# Patient Record
Sex: FEMALE | Race: WHITE | NOT HISPANIC OR LATINO | Employment: FULL TIME | URBAN - NONMETROPOLITAN AREA
[De-identification: names, ages, dates, MRNs, and addresses within clinical notes are randomized per-mention and may not be internally consistent; named-entity substitution may affect disease eponyms.]

---

## 2021-11-02 ENCOUNTER — TELEPHONE (OUTPATIENT)
Dept: FAMILY MEDICINE CLINIC | Age: 61
End: 2021-11-02

## 2021-11-02 PROBLEM — N90.3 VULVAR DYSPLASIA: Status: ACTIVE | Noted: 2021-11-02

## 2021-11-02 NOTE — TELEPHONE ENCOUNTER
Caller: Kranthi Farley    Relationship: Self    Best call back number: [phone]     What is the best time to reach you: ANYTIME    What was the call regarding: PATIENT RECEIVED TEXT REGARDING UPCOMING NURSE VISIT APPOINTMENT TO GET COVID TEST. SHE STATED SHE CANNOT COME IN THAT MORNING. IT IS LISTED AS A NURSE VISIT. TRIED TO WARM TRANSFER TO CLINICAL LINE, BUT PATIENT WANTED A MESSAGE SENT TO CLINICAL LINE INSTEAD OF TALKING TO THEM TO RESCHEDULE NOW.     Do you require a callback: YES

## 2021-11-10 RX ORDER — TOBRAMYCIN AND DEXAMETHASONE 3; 1 MG/ML; MG/ML
3 SUSPENSION/ DROPS OPHTHALMIC 2 TIMES DAILY
COMMUNITY
Start: 2021-10-01

## 2021-11-10 NOTE — PRE-PROCEDURE INSTRUCTIONS
PRE-OP INSTRUCTIONS REVIEWED WITH PATIENT: FASTING/BATHING/ARRIVAL PROCEDURES.  INSTRUCTED TO TAKE A.M. DAY OF SURGERY: NONE  UNDERSTANDING VERBALIZED.

## 2021-11-12 ENCOUNTER — ANESTHESIA EVENT (OUTPATIENT)
Dept: PERIOP | Facility: HOSPITAL | Age: 61
End: 2021-11-12

## 2021-11-12 ENCOUNTER — ANESTHESIA (OUTPATIENT)
Dept: PERIOP | Facility: HOSPITAL | Age: 61
End: 2021-11-12

## 2021-11-12 ENCOUNTER — HOSPITAL ENCOUNTER (OUTPATIENT)
Facility: HOSPITAL | Age: 61
Discharge: HOME | End: 2021-11-12
Attending: OBSTETRICS & GYNECOLOGY | Admitting: OBSTETRICS & GYNECOLOGY

## 2021-11-12 VITALS
HEART RATE: 99 BPM | RESPIRATION RATE: 16 BRPM | OXYGEN SATURATION: 96 % | DIASTOLIC BLOOD PRESSURE: 71 MMHG | WEIGHT: 201.28 LBS | HEIGHT: 72 IN | TEMPERATURE: 97.8 F | BODY MASS INDEX: 27.26 KG/M2 | SYSTOLIC BLOOD PRESSURE: 114 MMHG

## 2021-11-12 DIAGNOSIS — N90.3 VULVAR DYSPLASIA: Primary | ICD-10-CM

## 2021-11-12 PROCEDURE — 25010000002 MIDAZOLAM PER 1MG: Performed by: ANESTHESIOLOGY

## 2021-11-12 PROCEDURE — 25010000002 KETOROLAC TROMETHAMINE PER 15 MG: Performed by: NURSE ANESTHETIST, CERTIFIED REGISTERED

## 2021-11-12 PROCEDURE — 25010000002 FENTANYL CITRATE (PF) 50 MCG/ML SOLUTION: Performed by: NURSE ANESTHETIST, CERTIFIED REGISTERED

## 2021-11-12 PROCEDURE — 25010000002 PROPOFOL 10 MG/ML EMULSION: Performed by: NURSE ANESTHETIST, CERTIFIED REGISTERED

## 2021-11-12 PROCEDURE — 25010000002 ONDANSETRON PER 1 MG: Performed by: NURSE ANESTHETIST, CERTIFIED REGISTERED

## 2021-11-12 PROCEDURE — 88342 IMHCHEM/IMCYTCHM 1ST ANTB: CPT | Performed by: OBSTETRICS & GYNECOLOGY

## 2021-11-12 PROCEDURE — 88305 TISSUE EXAM BY PATHOLOGIST: CPT | Performed by: OBSTETRICS & GYNECOLOGY

## 2021-11-12 PROCEDURE — 25010000002 DEXAMETHASONE PER 1 MG: Performed by: NURSE ANESTHETIST, CERTIFIED REGISTERED

## 2021-11-12 RX ORDER — SCOLOPAMINE TRANSDERMAL SYSTEM 1 MG/1
1 PATCH, EXTENDED RELEASE TRANSDERMAL CONTINUOUS
Status: DISCONTINUED | OUTPATIENT
Start: 2021-11-12 | End: 2021-11-12 | Stop reason: HOSPADM

## 2021-11-12 RX ORDER — OXYCODONE HYDROCHLORIDE AND ACETAMINOPHEN 5; 325 MG/1; MG/1
1-2 TABLET ORAL EVERY 4 HOURS PRN
Qty: 5 TABLET | Refills: 0 | Status: SHIPPED | OUTPATIENT
Start: 2021-11-12

## 2021-11-12 RX ORDER — MEPERIDINE HYDROCHLORIDE 25 MG/ML
12.5 INJECTION INTRAMUSCULAR; INTRAVENOUS; SUBCUTANEOUS
Status: DISCONTINUED | OUTPATIENT
Start: 2021-11-12 | End: 2021-11-12 | Stop reason: HOSPADM

## 2021-11-12 RX ORDER — ONDANSETRON 4 MG/1
4 TABLET, FILM COATED ORAL ONCE AS NEEDED
Status: DISCONTINUED | OUTPATIENT
Start: 2021-11-12 | End: 2021-11-12 | Stop reason: HOSPADM

## 2021-11-12 RX ORDER — ACETAMINOPHEN 500 MG
1000 TABLET ORAL ONCE
Status: COMPLETED | OUTPATIENT
Start: 2021-11-12 | End: 2021-11-12

## 2021-11-12 RX ORDER — IBUPROFEN 600 MG/1
600 TABLET ORAL EVERY 6 HOURS PRN
Qty: 30 TABLET | Refills: 0 | Status: SHIPPED | OUTPATIENT
Start: 2021-11-12

## 2021-11-12 RX ORDER — GINSENG 100 MG
CAPSULE ORAL AS NEEDED
Status: DISCONTINUED | OUTPATIENT
Start: 2021-11-12 | End: 2021-11-12 | Stop reason: HOSPADM

## 2021-11-12 RX ORDER — PROMETHAZINE HYDROCHLORIDE 12.5 MG/1
25 TABLET ORAL ONCE AS NEEDED
Status: DISCONTINUED | OUTPATIENT
Start: 2021-11-12 | End: 2021-11-12 | Stop reason: HOSPADM

## 2021-11-12 RX ORDER — SODIUM CHLORIDE, SODIUM LACTATE, POTASSIUM CHLORIDE, CALCIUM CHLORIDE 600; 310; 30; 20 MG/100ML; MG/100ML; MG/100ML; MG/100ML
9 INJECTION, SOLUTION INTRAVENOUS CONTINUOUS PRN
Status: DISCONTINUED | OUTPATIENT
Start: 2021-11-12 | End: 2021-11-12 | Stop reason: HOSPADM

## 2021-11-12 RX ORDER — GLYCOPYRROLATE 0.2 MG/ML
INJECTION INTRAMUSCULAR; INTRAVENOUS AS NEEDED
Status: DISCONTINUED | OUTPATIENT
Start: 2021-11-12 | End: 2021-11-12 | Stop reason: SURG

## 2021-11-12 RX ORDER — SODIUM CHLORIDE 0.9 % (FLUSH) 0.9 %
10 SYRINGE (ML) INJECTION AS NEEDED
Status: DISCONTINUED | OUTPATIENT
Start: 2021-11-12 | End: 2021-11-12 | Stop reason: HOSPADM

## 2021-11-12 RX ORDER — PROMETHAZINE HYDROCHLORIDE 25 MG/1
25 SUPPOSITORY RECTAL ONCE AS NEEDED
Status: DISCONTINUED | OUTPATIENT
Start: 2021-11-12 | End: 2021-11-12 | Stop reason: HOSPADM

## 2021-11-12 RX ORDER — OXYCODONE HYDROCHLORIDE 5 MG/1
5 TABLET ORAL
Status: DISCONTINUED | OUTPATIENT
Start: 2021-11-12 | End: 2021-11-12 | Stop reason: HOSPADM

## 2021-11-12 RX ORDER — ONDANSETRON 2 MG/ML
4 INJECTION INTRAMUSCULAR; INTRAVENOUS ONCE AS NEEDED
Status: DISCONTINUED | OUTPATIENT
Start: 2021-11-12 | End: 2021-11-12 | Stop reason: HOSPADM

## 2021-11-12 RX ORDER — IBUPROFEN 600 MG/1
600 TABLET ORAL EVERY 6 HOURS PRN
Status: DISCONTINUED | OUTPATIENT
Start: 2021-11-12 | End: 2021-11-12 | Stop reason: HOSPADM

## 2021-11-12 RX ORDER — PROPOFOL 10 MG/ML
VIAL (ML) INTRAVENOUS AS NEEDED
Status: DISCONTINUED | OUTPATIENT
Start: 2021-11-12 | End: 2021-11-12 | Stop reason: SURG

## 2021-11-12 RX ORDER — MIDAZOLAM HYDROCHLORIDE 2 MG/2ML
2 INJECTION, SOLUTION INTRAMUSCULAR; INTRAVENOUS ONCE
Status: COMPLETED | OUTPATIENT
Start: 2021-11-12 | End: 2021-11-12

## 2021-11-12 RX ORDER — GLYCOPYRROLATE 0.2 MG/ML
0.2 INJECTION INTRAMUSCULAR; INTRAVENOUS
Status: COMPLETED | OUTPATIENT
Start: 2021-11-12 | End: 2021-11-12

## 2021-11-12 RX ORDER — SODIUM CHLORIDE 0.9 % (FLUSH) 0.9 %
3 SYRINGE (ML) INJECTION EVERY 12 HOURS SCHEDULED
Status: DISCONTINUED | OUTPATIENT
Start: 2021-11-12 | End: 2021-11-12 | Stop reason: HOSPADM

## 2021-11-12 RX ORDER — SODIUM CHLORIDE, SODIUM LACTATE, POTASSIUM CHLORIDE, CALCIUM CHLORIDE 600; 310; 30; 20 MG/100ML; MG/100ML; MG/100ML; MG/100ML
125 INJECTION, SOLUTION INTRAVENOUS CONTINUOUS
Status: DISCONTINUED | OUTPATIENT
Start: 2021-11-12 | End: 2021-11-12 | Stop reason: HOSPADM

## 2021-11-12 RX ORDER — FENTANYL CITRATE 50 UG/ML
INJECTION, SOLUTION INTRAMUSCULAR; INTRAVENOUS AS NEEDED
Status: DISCONTINUED | OUTPATIENT
Start: 2021-11-12 | End: 2021-11-12 | Stop reason: SURG

## 2021-11-12 RX ORDER — KETOROLAC TROMETHAMINE 30 MG/ML
INJECTION, SOLUTION INTRAMUSCULAR; INTRAVENOUS AS NEEDED
Status: DISCONTINUED | OUTPATIENT
Start: 2021-11-12 | End: 2021-11-12 | Stop reason: SURG

## 2021-11-12 RX ORDER — DEXAMETHASONE SODIUM PHOSPHATE 4 MG/ML
INJECTION, SOLUTION INTRA-ARTICULAR; INTRALESIONAL; INTRAMUSCULAR; INTRAVENOUS; SOFT TISSUE AS NEEDED
Status: DISCONTINUED | OUTPATIENT
Start: 2021-11-12 | End: 2021-11-12 | Stop reason: SURG

## 2021-11-12 RX ORDER — LIDOCAINE HYDROCHLORIDE 20 MG/ML
INJECTION, SOLUTION INFILTRATION; PERINEURAL AS NEEDED
Status: DISCONTINUED | OUTPATIENT
Start: 2021-11-12 | End: 2021-11-12 | Stop reason: SURG

## 2021-11-12 RX ADMIN — SODIUM CHLORIDE, POTASSIUM CHLORIDE, SODIUM LACTATE AND CALCIUM CHLORIDE 9 ML/HR: 600; 310; 30; 20 INJECTION, SOLUTION INTRAVENOUS at 07:42

## 2021-11-12 RX ADMIN — SODIUM CHLORIDE, POTASSIUM CHLORIDE, SODIUM LACTATE AND CALCIUM CHLORIDE 9 ML/HR: 600; 310; 30; 20 INJECTION, SOLUTION INTRAVENOUS at 07:44

## 2021-11-12 RX ADMIN — SODIUM CHLORIDE, POTASSIUM CHLORIDE, SODIUM LACTATE AND CALCIUM CHLORIDE 125 ML/HR: 600; 310; 30; 20 INJECTION, SOLUTION INTRAVENOUS at 07:45

## 2021-11-12 RX ADMIN — LIDOCAINE HYDROCHLORIDE 100 MG: 20 INJECTION, SOLUTION INFILTRATION; PERINEURAL at 08:40

## 2021-11-12 RX ADMIN — KETOROLAC TROMETHAMINE 30 MG: 30 INJECTION, SOLUTION INTRAMUSCULAR; INTRAVENOUS at 08:45

## 2021-11-12 RX ADMIN — ACETAMINOPHEN 1000 MG: 500 TABLET ORAL at 07:42

## 2021-11-12 RX ADMIN — SCOPALAMINE 1 PATCH: 1 PATCH, EXTENDED RELEASE TRANSDERMAL at 07:44

## 2021-11-12 RX ADMIN — PROPOFOL 150 MG: 10 INJECTION, EMULSION INTRAVENOUS at 08:41

## 2021-11-12 RX ADMIN — DEXAMETHASONE SODIUM PHOSPHATE 8 MG: 4 INJECTION INTRA-ARTICULAR; INTRALESIONAL; INTRAMUSCULAR; INTRAVENOUS; SOFT TISSUE at 08:45

## 2021-11-12 RX ADMIN — MIDAZOLAM HYDROCHLORIDE 2 MG: 1 INJECTION, SOLUTION INTRAMUSCULAR; INTRAVENOUS at 07:43

## 2021-11-12 RX ADMIN — GLYCOPYRROLATE 0.2 MG: 0.2 INJECTION INTRAMUSCULAR; INTRAVENOUS at 07:43

## 2021-11-12 RX ADMIN — GLYCOPYRROLATE 0.2 MG: 0.2 INJECTION INTRAMUSCULAR; INTRAVENOUS at 08:45

## 2021-11-12 RX ADMIN — ONDANSETRON 4 MG: 2 INJECTION INTRAMUSCULAR; INTRAVENOUS at 08:45

## 2021-11-12 RX ADMIN — FENTANYL CITRATE 100 MCG: 50 INJECTION, SOLUTION INTRAMUSCULAR; INTRAVENOUS at 08:40

## 2021-11-12 NOTE — ANESTHESIA POSTPROCEDURE EVALUATION
Patient: Kranthi Farley    Procedure Summary     Date: 11/12/21 Room / Location: [facility]  / [facility]    Anesthesia Start: 0834 Anesthesia Stop: 0907    Procedure: WIDE LOCAL EXCISION OF VULVA LESION (Right Vulva) Diagnosis:       Vulvar dysplasia      (Vulvar dysplasia [N90.3])    Surgeons: Dwayne Sood MD Provider:     Anesthesia Type: general ASA Status: 1          Anesthesia Type: general    Vitals  No vitals data found for the desired time range.          Post Anesthesia Care and Evaluation    Patient location during evaluation: bedside  Patient participation: complete - patient participated  Level of consciousness: awake  Pain management: adequate  Airway patency: patent  Anesthetic complications: No anesthetic complications  PONV Status: none  Cardiovascular status: acceptable  Respiratory status: acceptable  Hydration status: acceptable    Comments: An Anesthesiologist personally participated in the most demanding procedures (including induction and emergence if applicable) in the anesthesia plan, monitored the course of anesthesia administration at frequent intervals and remained physically present and available for immediate diagnosis and treatment of emergencies.

## 2021-11-12 NOTE — OP NOTE
VULVA BIOPSY  Procedure Report    Patient Name:  Kranthi Farley  YOB: 1960    Date of Surgery:  11/12/2021     Indications: Patient is 61-year-old female who presented me evaluation of vulvar ulceration.  Biopsy in the office showed severe dysplasia.  She was counseled by option she elected to undergo wide local excision.  Risk of the surgical infection and bleeding were discussed with patient she consented.    Pre-op Diagnosis:   Vulvar dysplasia [N90.3]       Post-Op Diagnosis Codes:     * Vulvar dysplasia [N90.3]    Procedure/CPT® Codes:      Procedure(s):  WIDE LOCAL EXCISION OF VULVA LESION    Staff:  Surgeon(s):  Dwayne Sood MD         Anesthesia: General    Estimated Blood Loss: 10 cc    Implants:    Nothing was implanted during the procedure    Specimen:          Specimens     ID Source Type Tests Collected By Collected At Frozen?    A Vulva Tissue · TISSUE PATHOLOGY EXAM   Dwayne Sood MD 11/12/21 0838     Description: RIGHT LABIA MAJORA LESION    This specimen was not marked as sent.              Findings: Bilateral 1 to 2 cm right labia majora ulceration noted.  No other lesions noted.    Complications: None    Description of Procedure: Patient was taken to the operating room where MAC anesthesia was obtained without any difficulty.  She was then prepped and draped in normal sterile fashion in the low lithotomy position.  Location of the lesion was marked.  The vulvar skin around the lesion was injected with 1% lidocaine with epinephrine wide local excision around the ulceration was performed.  Edges of the incision were approximated with 3-0 Vicryl suture in interrupted fashion.  Hemostasis was assured.  Sponge laps and needle counts were correct x2.  Patient was taken to recovery in stable condition.        Dwayne Sood MD     Date: 11/12/2021  Time: 09:04 EST

## 2021-11-12 NOTE — H&P
[facility]   PREOPERATIVE HISTORY AND PHYSICAL    Patient Name:Kranthi Farley  : 1960  MRN: [id number]  Primary Care Physician: Samuel, No Known  Date of admission: 2021    Subjective   Subjective     Chief Complaint: preoperative evaluation    History of Present Illness  Kranthi Farley is a 61 y.o. female who has vulvar dysplasia. She is scheduled for vulvar wide local excision (Right).    Review of Systems     Personal History     Past Medical History:   Diagnosis Date   • Cancer (HCC)     SKIN REMOVED   • Cholesteatoma     BILATERAL EARS   • PONV (postoperative nausea and vomiting)        Past Surgical History:   Procedure Laterality Date   • BLADDER REPAIR     •  SECTION     • INNER EAR SURGERY Bilateral     CHOLESTEOMA       Family History: Her family history is not on file.     Social History: She  reports that she has been smoking cigarettes. She has been smoking about 1.00 pack per day. She has never used smokeless tobacco. She reports that she does not drink alcohol and does not use drugs.    Home Medications:  tobramycin-dexamethasone    Allergies:  She has No Known Allergies.    Objective    Objective     Vitals:    AFVSS    Physical Exam:  Lungs:CTA stephenie  Heart:RRR  Abdomen:Soft NT    Assessment/Plan   Assessment / Plan     Brief Patient Summary:  Kranthi Farley is a 61 y.o. female who presents for preoperative evaluation.    Pre-Op Diagnosis Codes:     * Vulvar dysplasia [N90.3]    Active Hospital Problems:  Active Hospital Problems    Diagnosis    • **Vulvar dysplasia      Plan:   Procedure(s):  vulvar wide local excision    The risks, benefits, and alternatives of the procedure were discussed in detail with the patient and questions were answered. No guarantees were made or implied. Informed consent was obtained.

## 2021-11-12 NOTE — DISCHARGE INSTRUCTIONS
DISCHARGE INSTRUCTIONS  SURGICAL / AMBULATORY  PROCEDURES      ? For your surgery you had:  ? General anesthesia (you may have a sore throat for the first 24 hours)  ? IV sedation.  ? Local anesthesia  ? Monitored anesthesia Care  ? You received a medicated patch for nausea prevention today (behind your ear). It is recommended that you remove it 24-48 hours post-operatively. It must be removed within 72 hours.   ? You have received an anesthesia medication today that can cause hormonal forms of birth control to be ineffective. You should use a different form of birth control (to prevent pregnancy) for 7 days.   ? You may experience dizziness, drowsiness, or light-headedness for several hours following surgery/procedure.  ? Do not stay alone today or tonight.  ? Limit your activity for 24 hours.  ? Resume your diet slowly.  Follow whatever special dietary instructions you may have been given by your doctor.  ? You should not drive or operate machinery, drink alcohol, or sign legally binding documents for 24 hours or while you are taking pain medication.  Last dose of pain medication was given at:   .  NOTIFY YOUR DOCTOR IF YOU EXPERIENCE ANY OF THE FOLLOWING:  ? Temperature greater than 101 degrees Fahrenheit  ? Shaking Chills  ? Redness or excessive drainage from incision  ? Nausea, vomiting and/or pain that is not controlled by prescribed medications  ? Increase in bleeding or bleeding that is excessive  ? Unable to urinate in 6 hours after surgery  ? If unable to reach your doctor, please go to the closest Emergency Room  ? You may begin dressing changes:     [] in 24 hours   [] in 48 hours   [] Other:    ? You may remove Band-Aid/dressing tomorrow.  ? You may shower or bathe   .  ? Apply an ice pack 24-48 hours.  ? Medications per physician instructions as indicated on Discharge Medication Information Sheet.  You should see   for follow-up care   on   .  Phone number:       SPECIAL  INSTRUCTIONS:

## 2021-11-18 LAB
CYTO UR: NORMAL
LAB AP CASE REPORT: NORMAL
LAB AP CLINICAL INFORMATION: NORMAL
LAB AP SPECIAL STAINS: NORMAL
LAB AP SYNOPTIC CHECKLIST: NORMAL
PATH REPORT.FINAL DX SPEC: NORMAL
PATH REPORT.GROSS SPEC: NORMAL

## 2024-09-19 ENCOUNTER — APPOINTMENT (OUTPATIENT)
Dept: GENERAL RADIOLOGY | Facility: HOSPITAL | Age: 64
End: 2024-09-19
Payer: COMMERCIAL

## 2024-09-19 ENCOUNTER — HOSPITAL ENCOUNTER (INPATIENT)
Facility: HOSPITAL | Age: 64
LOS: 6 days | Discharge: HOME | End: 2024-09-25
Attending: STUDENT IN AN ORGANIZED HEALTH CARE EDUCATION/TRAINING PROGRAM | Admitting: STUDENT IN AN ORGANIZED HEALTH CARE EDUCATION/TRAINING PROGRAM
Payer: COMMERCIAL

## 2024-09-19 DIAGNOSIS — R91.8 PULMONARY NODULES: ICD-10-CM

## 2024-09-19 DIAGNOSIS — E87.6 HYPOKALEMIA: Primary | ICD-10-CM

## 2024-09-19 PROBLEM — J18.9 PNEUMONIA: Status: ACTIVE | Noted: 2024-09-19

## 2024-09-19 PROBLEM — E11.65 TYPE 2 DIABETES MELLITUS WITH HYPERGLYCEMIA, WITHOUT LONG-TERM CURRENT USE OF INSULIN: Status: ACTIVE | Noted: 2024-09-19

## 2024-09-19 PROBLEM — D69.6 THROMBOCYTOPENIA: Status: ACTIVE | Noted: 2024-09-19

## 2024-09-19 PROBLEM — D72.829 LEUKOCYTOSIS: Status: ACTIVE | Noted: 2024-09-19

## 2024-09-19 LAB
ALBUMIN SERPL-MCNC: 3.9 G/DL (ref 3.5–5.2)
ALBUMIN/GLOB SERPL: 1.4 G/DL
ALP SERPL-CCNC: 143 U/L (ref 39–117)
ALT SERPL W P-5'-P-CCNC: 46 U/L (ref 1–33)
ANION GAP SERPL CALCULATED.3IONS-SCNC: 10.9 MMOL/L (ref 5–15)
AST SERPL-CCNC: 31 U/L (ref 1–32)
B PARAPERT DNA SPEC QL NAA+PROBE: NOT DETECTED
B PERT DNA SPEC QL NAA+PROBE: NOT DETECTED
BACTERIA UR QL AUTO: ABNORMAL /HPF
BASOPHILS # BLD AUTO: 0.03 10*3/MM3 (ref 0–0.2)
BASOPHILS NFR BLD AUTO: 0.2 % (ref 0–1.5)
BILIRUB SERPL-MCNC: 1.3 MG/DL (ref 0–1.2)
BILIRUB UR QL STRIP: NEGATIVE
BUN SERPL-MCNC: 19 MG/DL (ref 8–23)
BUN/CREAT SERPL: 28.4 (ref 7–25)
C PNEUM DNA NPH QL NAA+NON-PROBE: NOT DETECTED
CALCIUM SPEC-SCNC: 9.6 MG/DL (ref 8.6–10.5)
CHLORIDE SERPL-SCNC: 92 MMOL/L (ref 98–107)
CLARITY UR: CLEAR
CO2 SERPL-SCNC: 39.1 MMOL/L (ref 22–29)
COLOR UR: YELLOW
CREAT SERPL-MCNC: 0.67 MG/DL (ref 0.57–1)
DEPRECATED RDW RBC AUTO: 42.6 FL (ref 37–54)
EGFRCR SERPLBLD CKD-EPI 2021: 97.7 ML/MIN/1.73
EOSINOPHIL # BLD AUTO: 0 10*3/MM3 (ref 0–0.4)
EOSINOPHIL NFR BLD AUTO: 0 % (ref 0.3–6.2)
ERYTHROCYTE [DISTWIDTH] IN BLOOD BY AUTOMATED COUNT: 14.2 % (ref 12.3–15.4)
FLUAV SUBTYP SPEC NAA+PROBE: NOT DETECTED
FLUBV RNA ISLT QL NAA+PROBE: NOT DETECTED
GLOBULIN UR ELPH-MCNC: 2.8 GM/DL
GLUCOSE SERPL-MCNC: 190 MG/DL (ref 65–99)
GLUCOSE UR STRIP-MCNC: NEGATIVE MG/DL
GRAN CASTS URNS QL MICRO: PRESENT /LPF
HADV DNA SPEC NAA+PROBE: NOT DETECTED
HCOV 229E RNA SPEC QL NAA+PROBE: NOT DETECTED
HCOV HKU1 RNA SPEC QL NAA+PROBE: NOT DETECTED
HCOV NL63 RNA SPEC QL NAA+PROBE: NOT DETECTED
HCOV OC43 RNA SPEC QL NAA+PROBE: NOT DETECTED
HCT VFR BLD AUTO: 42 % (ref 34–46.6)
HGB BLD-MCNC: 14.2 G/DL (ref 12–15.9)
HGB UR QL STRIP.AUTO: NEGATIVE
HMPV RNA NPH QL NAA+NON-PROBE: NOT DETECTED
HPIV1 RNA ISLT QL NAA+PROBE: NOT DETECTED
HPIV2 RNA SPEC QL NAA+PROBE: NOT DETECTED
HPIV3 RNA NPH QL NAA+PROBE: NOT DETECTED
HPIV4 P GENE NPH QL NAA+PROBE: NOT DETECTED
HYALINE CASTS UR QL AUTO: ABNORMAL /LPF
IMM GRANULOCYTES # BLD AUTO: 0.46 10*3/MM3 (ref 0–0.05)
IMM GRANULOCYTES NFR BLD AUTO: 2.7 % (ref 0–0.5)
KETONES UR QL STRIP: NEGATIVE
LEUKOCYTE ESTERASE UR QL STRIP.AUTO: ABNORMAL
LYMPHOCYTES # BLD AUTO: 1.08 10*3/MM3 (ref 0.7–3.1)
LYMPHOCYTES NFR BLD AUTO: 6.3 % (ref 19.6–45.3)
M PNEUMO IGG SER IA-ACNC: NOT DETECTED
MAGNESIUM SERPL-MCNC: 2 MG/DL (ref 1.6–2.4)
MCH RBC QN AUTO: 28.5 PG (ref 26.6–33)
MCHC RBC AUTO-ENTMCNC: 33.8 G/DL (ref 31.5–35.7)
MCV RBC AUTO: 84.2 FL (ref 79–97)
MONOCYTES # BLD AUTO: 0.81 10*3/MM3 (ref 0.1–0.9)
MONOCYTES NFR BLD AUTO: 4.7 % (ref 5–12)
NEUTROPHILS NFR BLD AUTO: 14.81 10*3/MM3 (ref 1.7–7)
NEUTROPHILS NFR BLD AUTO: 86.1 % (ref 42.7–76)
NITRITE UR QL STRIP: NEGATIVE
NRBC BLD AUTO-RTO: 0 /100 WBC (ref 0–0.2)
NT-PROBNP SERPL-MCNC: 709 PG/ML (ref 0–900)
PH UR STRIP.AUTO: 6.5 [PH] (ref 5–8)
PLATELET # BLD AUTO: 109 10*3/MM3 (ref 140–450)
PMV BLD AUTO: 8.4 FL (ref 6–12)
POTASSIUM SERPL-SCNC: 2.3 MMOL/L (ref 3.5–5.2)
PROCALCITONIN SERPL-MCNC: 1.75 NG/ML (ref 0–0.25)
PROT SERPL-MCNC: 6.7 G/DL (ref 6–8.5)
PROT UR QL STRIP: ABNORMAL
RBC # BLD AUTO: 4.99 10*6/MM3 (ref 3.77–5.28)
RBC # UR STRIP: ABNORMAL /HPF
REF LAB TEST METHOD: ABNORMAL
RENAL EPI CELLS #/AREA URNS HPF: ABNORMAL /HPF
RHINOVIRUS RNA SPEC NAA+PROBE: NOT DETECTED
RSV RNA NPH QL NAA+NON-PROBE: NOT DETECTED
SARS-COV-2 RNA NPH QL NAA+NON-PROBE: NOT DETECTED
SODIUM SERPL-SCNC: 142 MMOL/L (ref 136–145)
SP GR UR STRIP: 1.03 (ref 1–1.03)
SQUAMOUS #/AREA URNS HPF: ABNORMAL /HPF
TROPONIN T SERPL HS-MCNC: 16 NG/L
UROBILINOGEN UR QL STRIP: ABNORMAL
WBC # UR STRIP: ABNORMAL /HPF
WBC NRBC COR # BLD AUTO: 17.19 10*3/MM3 (ref 3.4–10.8)

## 2024-09-19 PROCEDURE — 80053 COMPREHEN METABOLIC PANEL: CPT | Performed by: STUDENT IN AN ORGANIZED HEALTH CARE EDUCATION/TRAINING PROGRAM

## 2024-09-19 PROCEDURE — 36415 COLL VENOUS BLD VENIPUNCTURE: CPT | Performed by: STUDENT IN AN ORGANIZED HEALTH CARE EDUCATION/TRAINING PROGRAM

## 2024-09-19 PROCEDURE — 87449 NOS EACH ORGANISM AG IA: CPT

## 2024-09-19 PROCEDURE — 93010 ELECTROCARDIOGRAM REPORT: CPT | Performed by: INTERNAL MEDICINE

## 2024-09-19 PROCEDURE — 84484 ASSAY OF TROPONIN QUANT: CPT | Performed by: STUDENT IN AN ORGANIZED HEALTH CARE EDUCATION/TRAINING PROGRAM

## 2024-09-19 PROCEDURE — 84145 PROCALCITONIN (PCT): CPT | Performed by: STUDENT IN AN ORGANIZED HEALTH CARE EDUCATION/TRAINING PROGRAM

## 2024-09-19 PROCEDURE — 83735 ASSAY OF MAGNESIUM: CPT | Performed by: STUDENT IN AN ORGANIZED HEALTH CARE EDUCATION/TRAINING PROGRAM

## 2024-09-19 PROCEDURE — 83880 ASSAY OF NATRIURETIC PEPTIDE: CPT | Performed by: STUDENT IN AN ORGANIZED HEALTH CARE EDUCATION/TRAINING PROGRAM

## 2024-09-19 PROCEDURE — 0202U NFCT DS 22 TRGT SARS-COV-2: CPT

## 2024-09-19 PROCEDURE — 82436 ASSAY OF URINE CHLORIDE: CPT | Performed by: STUDENT IN AN ORGANIZED HEALTH CARE EDUCATION/TRAINING PROGRAM

## 2024-09-19 PROCEDURE — 99285 EMERGENCY DEPT VISIT HI MDM: CPT

## 2024-09-19 PROCEDURE — 85025 COMPLETE CBC W/AUTO DIFF WBC: CPT | Performed by: STUDENT IN AN ORGANIZED HEALTH CARE EDUCATION/TRAINING PROGRAM

## 2024-09-19 PROCEDURE — 93005 ELECTROCARDIOGRAM TRACING: CPT | Performed by: STUDENT IN AN ORGANIZED HEALTH CARE EDUCATION/TRAINING PROGRAM

## 2024-09-19 PROCEDURE — 81001 URINALYSIS AUTO W/SCOPE: CPT

## 2024-09-19 PROCEDURE — 71045 X-RAY EXAM CHEST 1 VIEW: CPT

## 2024-09-19 PROCEDURE — 87040 BLOOD CULTURE FOR BACTERIA: CPT

## 2024-09-19 RX ORDER — BISACODYL 10 MG
10 SUPPOSITORY, RECTAL RECTAL DAILY PRN
Status: DISCONTINUED | OUTPATIENT
Start: 2024-09-19 | End: 2024-09-25 | Stop reason: HOSPADM

## 2024-09-19 RX ORDER — ONDANSETRON 4 MG/1
4 TABLET, ORALLY DISINTEGRATING ORAL EVERY 6 HOURS PRN
Status: DISCONTINUED | OUTPATIENT
Start: 2024-09-19 | End: 2024-09-25 | Stop reason: HOSPADM

## 2024-09-19 RX ORDER — POTASSIUM CHLORIDE 750 MG/1
40 TABLET, FILM COATED, EXTENDED RELEASE ORAL EVERY 4 HOURS
Status: COMPLETED | OUTPATIENT
Start: 2024-09-19 | End: 2024-09-20

## 2024-09-19 RX ORDER — POLYETHYLENE GLYCOL 3350 17 G/17G
17 POWDER, FOR SOLUTION ORAL DAILY PRN
Status: DISCONTINUED | OUTPATIENT
Start: 2024-09-19 | End: 2024-09-25 | Stop reason: HOSPADM

## 2024-09-19 RX ORDER — IPRATROPIUM BROMIDE AND ALBUTEROL SULFATE 2.5; .5 MG/3ML; MG/3ML
3 SOLUTION RESPIRATORY (INHALATION) EVERY 6 HOURS PRN
Status: DISCONTINUED | OUTPATIENT
Start: 2024-09-19 | End: 2024-09-25 | Stop reason: HOSPADM

## 2024-09-19 RX ORDER — ONDANSETRON 2 MG/ML
4 INJECTION INTRAMUSCULAR; INTRAVENOUS EVERY 6 HOURS PRN
Status: DISCONTINUED | OUTPATIENT
Start: 2024-09-19 | End: 2024-09-25 | Stop reason: HOSPADM

## 2024-09-19 RX ORDER — SODIUM CHLORIDE AND POTASSIUM CHLORIDE 150; 900 MG/100ML; MG/100ML
100 INJECTION, SOLUTION INTRAVENOUS CONTINUOUS
Status: DISCONTINUED | OUTPATIENT
Start: 2024-09-19 | End: 2024-09-20

## 2024-09-19 RX ORDER — IBUPROFEN 600 MG/1
1 TABLET ORAL
Status: DISCONTINUED | OUTPATIENT
Start: 2024-09-19 | End: 2024-09-25 | Stop reason: HOSPADM

## 2024-09-19 RX ORDER — BISACODYL 5 MG/1
5 TABLET, DELAYED RELEASE ORAL DAILY PRN
Status: DISCONTINUED | OUTPATIENT
Start: 2024-09-19 | End: 2024-09-25 | Stop reason: HOSPADM

## 2024-09-19 RX ORDER — NICOTINE POLACRILEX 4 MG
15 LOZENGE BUCCAL
Status: DISCONTINUED | OUTPATIENT
Start: 2024-09-19 | End: 2024-09-25 | Stop reason: HOSPADM

## 2024-09-19 RX ORDER — AMOXICILLIN 250 MG
2 CAPSULE ORAL 2 TIMES DAILY PRN
Status: DISCONTINUED | OUTPATIENT
Start: 2024-09-19 | End: 2024-09-25 | Stop reason: HOSPADM

## 2024-09-19 RX ORDER — NITROGLYCERIN 0.4 MG/1
0.4 TABLET SUBLINGUAL
Status: DISCONTINUED | OUTPATIENT
Start: 2024-09-19 | End: 2024-09-25 | Stop reason: HOSPADM

## 2024-09-19 RX ORDER — ACETAMINOPHEN 325 MG/1
650 TABLET ORAL EVERY 4 HOURS PRN
Status: DISCONTINUED | OUTPATIENT
Start: 2024-09-19 | End: 2024-09-25 | Stop reason: HOSPADM

## 2024-09-19 RX ORDER — CEFTRIAXONE 2 G/1
INJECTION, POWDER, FOR SOLUTION INTRAMUSCULAR; INTRAVENOUS
Status: ACTIVE
Start: 2024-09-19 | End: 2024-09-20

## 2024-09-19 RX ORDER — DEXTROSE MONOHYDRATE 25 G/50ML
25 INJECTION, SOLUTION INTRAVENOUS
Status: DISCONTINUED | OUTPATIENT
Start: 2024-09-19 | End: 2024-09-25 | Stop reason: HOSPADM

## 2024-09-19 RX ORDER — INSULIN LISPRO 100 [IU]/ML
2-7 INJECTION, SOLUTION INTRAVENOUS; SUBCUTANEOUS
Status: DISCONTINUED | OUTPATIENT
Start: 2024-09-20 | End: 2024-09-25 | Stop reason: HOSPADM

## 2024-09-19 RX ADMIN — POTASSIUM CHLORIDE 40 MEQ: 750 TABLET, EXTENDED RELEASE ORAL at 21:16

## 2024-09-20 ENCOUNTER — APPOINTMENT (OUTPATIENT)
Dept: CT IMAGING | Facility: HOSPITAL | Age: 64
End: 2024-09-20
Payer: COMMERCIAL

## 2024-09-20 PROBLEM — R03.0 ELEVATED BLOOD PRESSURE READING WITHOUT DIAGNOSIS OF HYPERTENSION: Status: ACTIVE | Noted: 2024-09-20

## 2024-09-20 PROBLEM — R93.89 ABNORMAL CT OF THE CHEST: Status: ACTIVE | Noted: 2024-09-20

## 2024-09-20 PROBLEM — R91.8 PULMONARY NODULES: Status: ACTIVE | Noted: 2024-09-20

## 2024-09-20 LAB
ANION GAP SERPL CALCULATED.3IONS-SCNC: 8.6 MMOL/L (ref 5–15)
BUN SERPL-MCNC: 18 MG/DL (ref 8–23)
BUN/CREAT SERPL: 27.7 (ref 7–25)
CALCIUM SPEC-SCNC: 9 MG/DL (ref 8.6–10.5)
CHLORIDE SERPL-SCNC: 96 MMOL/L (ref 98–107)
CHLORIDE UR-SCNC: 28 MMOL/L
CO2 SERPL-SCNC: 37.4 MMOL/L (ref 22–29)
CREAT SERPL-MCNC: 0.65 MG/DL (ref 0.57–1)
D-LACTATE SERPL-SCNC: 1.2 MMOL/L (ref 0.5–2)
DEPRECATED RDW RBC AUTO: 42.7 FL (ref 37–54)
EGFRCR SERPLBLD CKD-EPI 2021: 98.5 ML/MIN/1.73
ERYTHROCYTE [DISTWIDTH] IN BLOOD BY AUTOMATED COUNT: 13.9 % (ref 12.3–15.4)
GLUCOSE BLDC GLUCOMTR-MCNC: 165 MG/DL (ref 70–130)
GLUCOSE BLDC GLUCOMTR-MCNC: 167 MG/DL (ref 70–130)
GLUCOSE BLDC GLUCOMTR-MCNC: 175 MG/DL (ref 70–130)
GLUCOSE BLDC GLUCOMTR-MCNC: 80 MG/DL (ref 70–130)
GLUCOSE SERPL-MCNC: 166 MG/DL (ref 65–99)
HCT VFR BLD AUTO: 36.8 % (ref 34–46.6)
HGB BLD-MCNC: 12.3 G/DL (ref 12–15.9)
INR PPP: 1.12 (ref 0.9–1.1)
L PNEUMO1 AG UR QL IA: NEGATIVE
MAGNESIUM SERPL-MCNC: 2 MG/DL (ref 1.6–2.4)
MCH RBC QN AUTO: 28.2 PG (ref 26.6–33)
MCHC RBC AUTO-ENTMCNC: 33.4 G/DL (ref 31.5–35.7)
MCV RBC AUTO: 84.4 FL (ref 79–97)
MRSA DNA SPEC QL NAA+PROBE: NORMAL
PLATELET # BLD AUTO: 104 10*3/MM3 (ref 140–450)
PMV BLD AUTO: 9.1 FL (ref 6–12)
POTASSIUM SERPL-SCNC: 3 MMOL/L (ref 3.5–5.2)
PROTHROMBIN TIME: 14.6 SECONDS (ref 11.7–14.2)
QT INTERVAL: 484 MS
QTC INTERVAL: 534 MS
RBC # BLD AUTO: 4.36 10*6/MM3 (ref 3.77–5.28)
S PNEUM AG SPEC QL LA: NEGATIVE
SODIUM SERPL-SCNC: 142 MMOL/L (ref 136–145)
WBC NRBC COR # BLD AUTO: 13.7 10*3/MM3 (ref 3.4–10.8)

## 2024-09-20 PROCEDURE — 80048 BASIC METABOLIC PNL TOTAL CA: CPT

## 2024-09-20 PROCEDURE — 63710000001 INSULIN LISPRO (HUMAN) PER 5 UNITS

## 2024-09-20 PROCEDURE — 25810000003 SODIUM CHLORIDE 0.9 % SOLUTION 250 ML FLEX CONT

## 2024-09-20 PROCEDURE — 25010000002 ENOXAPARIN PER 10 MG: Performed by: STUDENT IN AN ORGANIZED HEALTH CARE EDUCATION/TRAINING PROGRAM

## 2024-09-20 PROCEDURE — 82948 REAGENT STRIP/BLOOD GLUCOSE: CPT

## 2024-09-20 PROCEDURE — 25010000002 AZITHROMYCIN PER 500 MG

## 2024-09-20 PROCEDURE — 84244 ASSAY OF RENIN: CPT | Performed by: STUDENT IN AN ORGANIZED HEALTH CARE EDUCATION/TRAINING PROGRAM

## 2024-09-20 PROCEDURE — 25510000001 IOPAMIDOL 61 % SOLUTION: Performed by: STUDENT IN AN ORGANIZED HEALTH CARE EDUCATION/TRAINING PROGRAM

## 2024-09-20 PROCEDURE — 85027 COMPLETE CBC AUTOMATED: CPT

## 2024-09-20 PROCEDURE — 25010000002 CEFTRIAXONE PER 250 MG

## 2024-09-20 PROCEDURE — 82088 ASSAY OF ALDOSTERONE: CPT | Performed by: STUDENT IN AN ORGANIZED HEALTH CARE EDUCATION/TRAINING PROGRAM

## 2024-09-20 PROCEDURE — 85610 PROTHROMBIN TIME: CPT

## 2024-09-20 PROCEDURE — 74177 CT ABD & PELVIS W/CONTRAST: CPT

## 2024-09-20 PROCEDURE — 83605 ASSAY OF LACTIC ACID: CPT

## 2024-09-20 PROCEDURE — 99223 1ST HOSP IP/OBS HIGH 75: CPT | Performed by: INTERNAL MEDICINE

## 2024-09-20 PROCEDURE — 83735 ASSAY OF MAGNESIUM: CPT | Performed by: STUDENT IN AN ORGANIZED HEALTH CARE EDUCATION/TRAINING PROGRAM

## 2024-09-20 PROCEDURE — 87040 BLOOD CULTURE FOR BACTERIA: CPT

## 2024-09-20 PROCEDURE — 25010000002 SODIUM CHLORIDE 0.9 % WITH KCL 20 MEQ 20-0.9 MEQ/L-% SOLUTION

## 2024-09-20 PROCEDURE — 71260 CT THORAX DX C+: CPT

## 2024-09-20 PROCEDURE — 87641 MR-STAPH DNA AMP PROBE: CPT

## 2024-09-20 RX ORDER — BUDESONIDE AND FORMOTEROL FUMARATE DIHYDRATE 160; 4.5 UG/1; UG/1
2 AEROSOL RESPIRATORY (INHALATION)
Status: DISCONTINUED | OUTPATIENT
Start: 2024-09-20 | End: 2024-09-25 | Stop reason: HOSPADM

## 2024-09-20 RX ORDER — POTASSIUM CHLORIDE 750 MG/1
40 TABLET, FILM COATED, EXTENDED RELEASE ORAL EVERY 4 HOURS
Status: COMPLETED | OUTPATIENT
Start: 2024-09-20 | End: 2024-09-20

## 2024-09-20 RX ORDER — SPIRONOLACTONE 50 MG/1
50 TABLET, FILM COATED ORAL
Status: DISCONTINUED | OUTPATIENT
Start: 2024-09-20 | End: 2024-09-22

## 2024-09-20 RX ORDER — LANCETS 30 GAUGE
EACH MISCELLANEOUS
Qty: 100 EACH | Refills: 12 | Status: SHIPPED | OUTPATIENT
Start: 2024-09-20

## 2024-09-20 RX ORDER — ENOXAPARIN SODIUM 100 MG/ML
40 INJECTION SUBCUTANEOUS EVERY 24 HOURS
Status: DISCONTINUED | OUTPATIENT
Start: 2024-09-20 | End: 2024-09-25 | Stop reason: HOSPADM

## 2024-09-20 RX ORDER — BLOOD-GLUCOSE METER
EACH MISCELLANEOUS
Qty: 1 KIT | Refills: 0 | Status: SHIPPED | OUTPATIENT
Start: 2024-09-20

## 2024-09-20 RX ORDER — IOPAMIDOL 612 MG/ML
85 INJECTION, SOLUTION INTRAVASCULAR
Status: COMPLETED | OUTPATIENT
Start: 2024-09-20 | End: 2024-09-20

## 2024-09-20 RX ADMIN — CEFTRIAXONE 2000 MG: 2 INJECTION, POWDER, FOR SOLUTION INTRAMUSCULAR; INTRAVENOUS at 00:06

## 2024-09-20 RX ADMIN — INSULIN LISPRO 2 UNITS: 100 INJECTION, SOLUTION INTRAVENOUS; SUBCUTANEOUS at 23:08

## 2024-09-20 RX ADMIN — POTASSIUM CHLORIDE 40 MEQ: 750 TABLET, EXTENDED RELEASE ORAL at 23:07

## 2024-09-20 RX ADMIN — POTASSIUM CHLORIDE 40 MEQ: 750 TABLET, EXTENDED RELEASE ORAL at 02:35

## 2024-09-20 RX ADMIN — INSULIN LISPRO 2 UNITS: 100 INJECTION, SOLUTION INTRAVENOUS; SUBCUTANEOUS at 17:20

## 2024-09-20 RX ADMIN — AZITHROMYCIN DIHYDRATE 500 MG: 500 INJECTION, POWDER, LYOPHILIZED, FOR SOLUTION INTRAVENOUS at 01:30

## 2024-09-20 RX ADMIN — POTASSIUM CHLORIDE 40 MEQ: 750 TABLET, EXTENDED RELEASE ORAL at 11:24

## 2024-09-20 RX ADMIN — POTASSIUM CHLORIDE AND SODIUM CHLORIDE 100 ML/HR: 900; 150 INJECTION, SOLUTION INTRAVENOUS at 06:03

## 2024-09-20 RX ADMIN — POTASSIUM CHLORIDE 40 MEQ: 750 TABLET, EXTENDED RELEASE ORAL at 17:20

## 2024-09-20 RX ADMIN — ENOXAPARIN SODIUM 40 MG: 100 INJECTION SUBCUTANEOUS at 09:17

## 2024-09-20 RX ADMIN — SPIRONOLACTONE 50 MG: 50 TABLET, FILM COATED ORAL at 23:08

## 2024-09-20 RX ADMIN — IOPAMIDOL 85 ML: 612 INJECTION, SOLUTION INTRAVENOUS at 10:46

## 2024-09-20 RX ADMIN — CEFTRIAXONE 2000 MG: 2 INJECTION, POWDER, FOR SOLUTION INTRAMUSCULAR; INTRAVENOUS at 23:11

## 2024-09-20 RX ADMIN — INSULIN LISPRO 2 UNITS: 100 INJECTION, SOLUTION INTRAVENOUS; SUBCUTANEOUS at 09:17

## 2024-09-20 RX ADMIN — POTASSIUM CHLORIDE 40 MEQ: 750 TABLET, EXTENDED RELEASE ORAL at 06:07

## 2024-09-20 RX ADMIN — AZITHROMYCIN DIHYDRATE 500 MG: 500 INJECTION, POWDER, LYOPHILIZED, FOR SOLUTION INTRAVENOUS at 23:11

## 2024-09-20 NOTE — CASE MANAGEMENT/SOCIAL WORK
Discharge Planning Assessment  [facility]     Patient Name: Kranthi Farley  MRN: [id number]  Today's Date: 9/20/2024    Admit Date: 9/19/2024    Plan: Home with family   Discharge Needs Assessment       Row Name 09/20/24 1510       Living Environment    People in Home spouse    Current Living Arrangements home    Potentially Unsafe Housing Conditions none    Primary Care Provided by self    Family Caregiver if Needed child(emilie), adult    Quality of Family Relationships involved;helpful    Able to Return to Prior Arrangements yes       Resource/Environmental Concerns    Resource/Environmental Concerns none       Transition Planning    Patient/Family Anticipates Transition to home with family    Patient/Family Anticipated Services at Transition none    Transportation Anticipated family or friend will provide       Discharge Needs Assessment    Readmission Within the Last 30 Days no previous admission in last 30 days    Equipment Currently Used at Home none    Concerns to be Addressed no discharge needs identified    Anticipated Changes Related to Illness none    Equipment Needed After Discharge none                   Discharge Plan       Row Name 09/20/24 1514       Plan    Plan Home with family    Patient/Family in Agreement with Plan yes    Plan Comments Spoke with patient at bedside, introduced self, explained CCP role and verified face sheet and pharmacy information. Pt lives with her  Norma in 1 level home with no YOANA, she is IADL's, uses no medical equipment, has no HH or SNF history. She plans home with [facility] to transport, no anticipated needs,  CCP will follow - Shahida JEAN-BAPTISTE                  Continued Care and Services - Admitted Since 9/19/2024    No active coordination exists for this encounter.       Expected Discharge Date and Time       Expected Discharge Date Expected Discharge Time    Sep 23, 2024            Demographic Summary       Row Name 09/20/24 1510       General Information     Admission Type inpatient                   Functional Status       Row Name 09/20/24 1510       Functional Status    Usual Activity Tolerance excellent    Current Activity Tolerance good       Assessment of Health Literacy    Health Literacy Good       Functional Status, IADL    Medications independent    Meal Preparation independent    Housekeeping independent    Laundry independent    Shopping independent       Mental Status    General Appearance WDL WDL       Mental Status Summary    Recent Changes in Mental Status/Cognitive Functioning no changes                   Psychosocial    No documentation.                  Abuse/Neglect    No documentation.                  Legal       Row Name 09/20/24 1510       Financial/Legal    Who Manages Finances if Patient Unable  or daughters                   Substance Abuse    No documentation.                  Patient Forms    No documentation.                     Shahida Darling RN

## 2024-09-20 NOTE — CONSULTS
"Diabetes Education  Assessment/Teaching    Patient Name:  Kranthi Farley  YOB: 1960  MRN: [id number]  Admit Date:  9/19/2024      Assessment Date:  9/20/2024  Flowsheet Row Most Recent Value   General Information     Referral From: A1c  [7.5]   Height 185.4 cm (72.99\")   Height Method Stated   Weight 69.9 kg (154 lb 3.2 oz)   Weight Method Standing scale   Diabetes History    What type of diabetes do you have? Type 2   Length of Diabetes Diagnosis Newly diagnosed <6 months  [this admission]   Current DM knowledge poor   Do you have any diabetes complications? none   Education Preferences    What areas of diabetes would you like to learn about? avoiding high blood sugar, diabetes complications, diet information, medications for diabetes, testing my blood sugar at home, understanding diabetes   Barriers to Learning other (comment)  [none noted]   Assessment Topics    Healthy Eating - Assessment Needs education   Being Active - Assessment Needs education   Taking Medication - Assessment Needs education   Problem Solving - Assessment Needs education   Reducing Risk - Assessment Needs education   Healthy Coping - Assessment Needs education   Monitoring - Assessment Needs education   DM Goals    Healthy Eating - Goal 0-7 days from discharge   Being Active - Goal 0-7 days from discharge   Taking Medication - Goal 0-7 days from discharge   Problem Solving - Goal 0-7 days from discharge   Reducing Risk - Goal 0-30 days from discharge   Healthy Coping - Goal 0-30 days from discharge   Monitoring - Goal 0-7 days from discharge   Contact Plan Follow-up medical care, 0-30 days from discharge       Flowsheet Row Most Recent Value   DM Education Needs    Meter Other (comment)  [ordered]   Meter Type One Touch  [Verio]   Frequency of Testing Daily   Problem Solving Hyperglycemia, Signs, Symptoms, Treatment   Reducing Risks A1C testing, Blood pressure, Eye exam, Lipids, Dental exam   Healthy Eating RD consult "   Physical Activity Walking   Physical Activity Frequency Regularly, Discussed exercise importance   Healthy Coping Denial, expressed she can get rid of it   Discharge Plan Home, Follow-up with PCP   Motivation Moderate   Teaching Method Explanation, Discussion, Demonstration, Handouts, Teach back   Patient Response Verbalized understanding, Demonstrates adequately       Other Comments:  Pt newly dx T2DM this admission with A1c 7.5. Reviewed survival skills booklet, glucose monitoring, and follow up with PCP. RN demonstrated glucose monitoring, and pt successfully return demonstrated. Informed pt about outpatient education classes and appointments. Number provided to reach out for questions/concerns. RD consulted for diet education.      Electronically signed by:  Shiva Mejía RN  09/20/24 13:31 EDT

## 2024-09-20 NOTE — ED PROVIDER NOTES
EMERGENCY DEPARTMENT ENCOUNTER  Room Number:    PCP: Alexandra Keller PA-C  Independent Historians: Patient      HPI:  Chief Complaint: had concerns including Abnormal Lab.     Context: Kranthi Farley is a 64 y.o. female with a medical history of cancer who presents to the ED c/o acute laboratory abnormality.  Patient had lab work done by primary care yesterday and was contacted this morning and told to come to the emergency department due to low potassium and high white blood cell count.  Patient states she does have some generalized malaise and some shortness of breath which she attributes to her emphysema.  Patient denies chest pain, palpitations.      Review of prior external notes (non-ED) -and- Review of prior external test results outside of this encounter: Progress note from earlier today reviewed and notable for presentation secondary to fatigue and weight loss.  Plan at that time was to obtain outpatient laboratory evaluation.  Patient was contacted regarding laboratory abnormalities and instructed to come to the ER.    Prescription drug monitoring program review:         PAST MEDICAL HISTORY  Active Ambulatory Problems     Diagnosis Date Noted    Vulvar dysplasia 2021     Resolved Ambulatory Problems     Diagnosis Date Noted    No Resolved Ambulatory Problems     Past Medical History:   Diagnosis Date    Cancer     Cholesteatoma     PONV (postoperative nausea and vomiting)          PAST SURGICAL HISTORY  Past Surgical History:   Procedure Laterality Date    BLADDER REPAIR       SECTION      INNER EAR SURGERY Bilateral     CHOLESTEOMA    VULVA BIOPSY Right 2021    Procedure: WIDE LOCAL EXCISION OF VULVA LESION;  Surgeon: Dwayne Sood MD;  Location[place] [facility] [place] [facility] [place]  Service: Gynecology;  Laterality: Right;         FAMILY HISTORY  Family History   Problem Relation Age of Onset    Malig Hyperthermia Neg Hx          SOCIAL HISTORY  Social History     Socioeconomic History     Marital status:    Tobacco Use    Smoking status: Every Day     Current packs/day: 1.00     Types: Cigarettes    Smokeless tobacco: Never    Tobacco comments:     INST. PER ANESTHESIA PROTOCOL   Vaping Use    Vaping status: Never Used   Substance and Sexual Activity    Alcohol use: Never    Drug use: Never    Sexual activity: Defer         ALLERGIES  Patient has no known allergies.      REVIEW OF SYSTEMS  Review of Systems  Included in HPI  All systems reviewed and negative except for those discussed in HPI.      PHYSICAL EXAM    I have reviewed the triage vital signs and nursing notes.    ED Triage Vitals   Temp Heart Rate Resp BP SpO2   09/19/24 1944 09/19/24 1944 09/19/24 1944 09/19/24 1946 09/19/24 1944   98 °F (36.7 °C) 107 18 (!) 197/104 92 %      Temp src Heart Rate Source Patient Position BP Location FiO2 (%)   09/19/24 1944 -- -- -- --   Tympanic           Physical Exam  GENERAL: alert, no acute distress  SKIN: Warm, dry  HENT: Normocephalic, atraumatic  EYES: no scleral icterus  CV: regular rhythm, regular rate  RESPIRATORY: normal effort, lungs clear  ABDOMEN: soft, nontender, nondistended  MUSCULOSKELETAL: no deformity  NEURO: alert, moves all extremities, follows commands            LAB RESULTS  Recent Results (from the past 24 hour(s))   HEMOGLOBIN A1C    Collection Time: 09/19/24  9:25 AM    Specimen type and source: Whole Blood, Blood specimen from patient (specimen)   Result Value Ref Range    Hemoglobin A1C 7.5 (H) 4.3 - 5.6 %    Mean Bld Glu Estim. 168 mg/dL   CBC AND DIFFERENTIAL    Collection Time: 09/19/24  9:25 AM    Specimen type and source: Whole Blood, Blood specimen from patient (specimen)   Result Value Ref Range    WBC 19.98 (H) 4.5 - 11.0 10*3/uL    RBC 5.20 4.0 - 5.2 10*6/uL    Hemoglobin 14.6 12.0 - 16.0 g/dL    Hematocrit 43.7 36.0 - 46.0 %    MCV 84.0 80.0 - 100.0 fL    MCH 28.1 26.0 - 34.0 pg    MCHC 33.4 31.0 - 37.0 g/dL    RDW 14.0 12.0 - 16.8 %    Platelets 129 (L) 140 -  440 10*3/uL    MPV 9.2 8.4 - 12.4 fL    Differential Type Physician Office CBC w/AutoDiff (arb'U)    Neutrophil Rel % 87.2 (H) 45 - 80 %    Basophil Rel % 0.2 0 - 2 %    Basophils Absolute 0.04 0.0 - 0.2 10*3/uL    Eosinophil % 0.1 0 - 7 %    Eosinophils Absolute 0.02 0.0 - 0.8 10*3/uL    Immature Grans % 3.0 (H) 0.0 - 1.0 %    Immature Grans, Absolute 0.59 (H) 0.00 - 0.10 10*3/uL    Lymphocytes Absolute 1.05 0.7 - 5.5 10*3/uL    Lymphocyte Rel % 5.3 (L) 15 - 50 %    Monocytes Absolute 0.84 0.0 - 1.7 10*3/uL    Monocyte Rel % 4.2 0 - 15 %    Neutrophils Absolute 17.44 (H) 2.0 - 8.8 10*3/uL   VITAMIN D,25-HYDROXY    Collection Time: 09/19/24  9:25 AM    Specimen type and source: Serum, Blood specimen from patient (specimen)   Result Value Ref Range    25 Hydroxy, Vitamin D 16.1 (L) >30 ng/mL   VITAMIN B12    Collection Time: 09/19/24  9:25 AM    Specimen type and source: Serum, Blood specimen from patient (specimen)   Result Value Ref Range    Vitamin B-12 283 >180 pg/mL   Comprehensive Metabolic Panel    Collection Time: 09/19/24  8:06 PM    Specimen: Arm, Left; Blood   Result Value Ref Range    Glucose 190 (H) 65 - 99 mg/dL    BUN 19 8 - 23 mg/dL    Creatinine 0.67 0.57 - 1.00 mg/dL    Sodium 142 136 - 145 mmol/L    Potassium 2.3 (C) 3.5 - 5.2 mmol/L    Chloride 92 (L) 98 - 107 mmol/L    CO2 39.1 (H) 22.0 - 29.0 mmol/L    Calcium 9.6 8.6 - 10.5 mg/dL    Total Protein 6.7 6.0 - 8.5 g/dL    Albumin 3.9 3.5 - 5.2 g/dL    ALT (SGPT) 46 (H) 1 - 33 U/L    AST (SGOT) 31 1 - 32 U/L    Alkaline Phosphatase 143 (H) 39 - 117 U/L    Total Bilirubin 1.3 (H) 0.0 - 1.2 mg/dL    Globulin 2.8 gm/dL    A/G Ratio 1.4 g/dL    BUN/Creatinine Ratio 28.4 (H) 7.0 - 25.0    Anion Gap 10.9 5.0 - 15.0 mmol/L    eGFR 97.7 >60.0 mL/min/1.73   Magnesium    Collection Time: 09/19/24  8:06 PM    Specimen: Arm, Left; Blood   Result Value Ref Range    Magnesium 2.0 1.6 - 2.4 mg/dL   CBC Auto Differential    Collection Time: 09/19/24  8:06 PM     Specimen: Arm, Left; Blood   Result Value Ref Range    WBC 17.19 (H) 3.40 - 10.80 10*3/mm3    RBC 4.99 3.77 - 5.28 10*6/mm3    Hemoglobin 14.2 12.0 - 15.9 g/dL    Hematocrit 42.0 34.0 - 46.6 %    MCV 84.2 79.0 - 97.0 fL    MCH 28.5 26.6 - 33.0 pg    MCHC 33.8 31.5 - 35.7 g/dL    RDW 14.2 12.3 - 15.4 %    RDW-SD 42.6 37.0 - 54.0 fl    MPV 8.4 6.0 - 12.0 fL    Platelets 109 (L) 140 - 450 10*3/mm3    Neutrophil % 86.1 (H) 42.7 - 76.0 %    Lymphocyte % 6.3 (L) 19.6 - 45.3 %    Monocyte % 4.7 (L) 5.0 - 12.0 %    Eosinophil % 0.0 (L) 0.3 - 6.2 %    Basophil % 0.2 0.0 - 1.5 %    Immature Grans % 2.7 (H) 0.0 - 0.5 %    Neutrophils, Absolute 14.81 (H) 1.70 - 7.00 10*3/mm3    Lymphocytes, Absolute 1.08 0.70 - 3.10 10*3/mm3    Monocytes, Absolute 0.81 0.10 - 0.90 10*3/mm3    Eosinophils, Absolute 0.00 0.00 - 0.40 10*3/mm3    Basophils, Absolute 0.03 0.00 - 0.20 10*3/mm3    Immature Grans, Absolute 0.46 (H) 0.00 - 0.05 10*3/mm3    nRBC 0.0 0.0 - 0.2 /100 WBC   Single High Sensitivity Troponin T    Collection Time: 09/19/24  8:06 PM    Specimen: Arm, Left; Blood   Result Value Ref Range    HS Troponin T 16 (H) <14 ng/L   BNP    Collection Time: 09/19/24  8:06 PM    Specimen: Arm, Left; Blood   Result Value Ref Range    proBNP 709.0 0.0 - 900.0 pg/mL   ECG 12 Lead Dyspnea    Collection Time: 09/19/24  8:45 PM   Result Value Ref Range    QT Interval 484 ms    QTC Interval 534 ms         RADIOLOGY  XR Chest 1 View    Result Date: 9/19/2024  XR CHEST 1 VW-  Clinical: Shortness of breath  COMPARISON: None  FINDINGS: Cardiac size within normal limits. There is atherosclerotic calcification of the aorta. There is a moderate amount of left upper lobe infiltrate seen, consistent with pneumonia. Very subtle right upper lobe infiltrate is suggested. No pleural effusion or pulmonary edema is demonstrated. Advise short-term follow-up to document clearing. Remainder is unremarkable.  This report was finalized on 9/19/2024 8:51 PM by Dr. Crowley  HARLEY Aragon on Workstation: [id number]         MEDICATIONS GIVEN IN ER  Medications   Potassium Replacement - Follow Nurse / BPA Driven Protocol (has no administration in time range)   potassium chloride (K-DUR,KLOR-CON) ER tablet 40 mEq (40 mEq Oral Given 9/19/24 2116)   nitroglycerin (NITROSTAT) SL tablet 0.4 mg (has no administration in time range)   acetaminophen (TYLENOL) tablet 650 mg (has no administration in time range)   sennosides-docusate (PERICOLACE) 8.6-50 MG per tablet 2 tablet (has no administration in time range)     And   polyethylene glycol (MIRALAX) packet 17 g (has no administration in time range)     And   bisacodyl (DULCOLAX) EC tablet 5 mg (has no administration in time range)     And   bisacodyl (DULCOLAX) suppository 10 mg (has no administration in time range)   ondansetron ODT (ZOFRAN-ODT) disintegrating tablet 4 mg (has no administration in time range)     Or   ondansetron (ZOFRAN) injection 4 mg (has no administration in time range)   sodium chloride 0.9 % with KCl 20 mEq/L infusion (has no administration in time range)         ORDERS PLACED DURING THIS VISIT:  Orders Placed This Encounter   Procedures    Respiratory Panel PCR w/COVID-19(SARS-CoV-2) NIDA/LUCIAN/NIALL/PAD/COR/GENNY In-House, NP Swab in UTM/VTM, 2 HR TAT - Swab, Nasopharynx    Legionella Antigen, Urine - Urine, Urine, Clean Catch    MRSA Screen, PCR (Inpatient) - Swab, Nares    S. Pneumo Ag Urine or CSF - Urine, Urine, Clean Catch    Blood Culture - Blood,    Blood Culture - Blood,    XR Chest 1 View    Comprehensive Metabolic Panel    Magnesium    CBC Auto Differential    Single High Sensitivity Troponin T    BNP    Potassium    Basic Metabolic Panel    CBC (No Diff)    Urinalysis With Microscopic If Indicated (No Culture) - Urine, Clean Catch    Procalcitonin    Protime-INR    Chloride, Urine, Random -    Diet: Regular/House; Fluid Consistency: Thin (IDDSI 0)    Vital Signs    Maintain IV Access    Telemetry - Place Orders &  Notify Provider of Results When Patient Experiences Acute Chest Pain, Dysrhythmia or Respiratory Distress    May Be Off Telemetry for Tests    Up with assistance    Daily Weights    Strict Intake & Output    Oral Care    Place Sequential Compression Device    Maintain Sequential Compression Device    Continuous Pulse Oximetry    Code Status and Medical Interventions: CPR (Attempt to Resuscitate); Full    [facility] (on-call MD unless specified) Details    Oxygen Therapy- Nasal Cannula; Titrate 1-6 LPM Per SpO2; 90% or Greater    ECG 12 Lead Dyspnea    Inpatient Admission    CBC & Differential         OUTPATIENT MEDICATION MANAGEMENT:  Current Facility-Administered Medications Ordered in Epic   Medication Dose Route Frequency Provider Last Rate Last Admin    acetaminophen (TYLENOL) tablet 650 mg  650 mg Oral Q4H PRN Lashell Ovalles APRN        sennosides-docusate (PERICOLACE) 8.6-50 MG per tablet 2 tablet  2 tablet Oral BID PRN Lashell Ovalles APRN        And    polyethylene glycol (MIRALAX) packet 17 g  17 g Oral Daily PRN Lashell Ovalles APRN        And    bisacodyl (DULCOLAX) EC tablet 5 mg  5 mg Oral Daily PRN Lashell Ovallse APRN        And    bisacodyl (DULCOLAX) suppository 10 mg  10 mg Rectal Daily PRN Lashell Ovalles APRN        nitroglycerin (NITROSTAT) SL tablet 0.4 mg  0.4 mg Sublingual Q5 Min PRN Lashell Ovalles APRN        ondansetron ODT (ZOFRAN-ODT) disintegrating tablet 4 mg  4 mg Oral Q6H PRN Lashell Ovalles APRN        Or    ondansetron (ZOFRAN) injection 4 mg  4 mg Intravenous Q6H PRN Lashell Ovalles APRN        potassium chloride (K-DUR,KLOR-CON) ER tablet 40 mEq  40 mEq Oral Q4H Kelly Reardon MD   40 mEq at 09/19/24 2116    Potassium Replacement - Follow Nurse / BPA Driven Protocol   Does not apply PRN Jose Bangura MD        sodium chloride 0.9 % with KCl 20 mEq/L infusion  100 mL/hr Intravenous Continuous Lashell Ovalles APRN         Current Outpatient  Medications Ordered in Epic   Medication Sig Dispense Refill    ibuprofen (ADVIL,MOTRIN) 600 MG tablet Take 1 tablet by mouth Every 6 (Six) Hours As Needed for Mild Pain . 30 tablet 0    oxyCODONE-acetaminophen (PERCOCET) 5-325 MG per tablet Take 1-2 tablets by mouth Every 4 (Four) Hours As Needed (Pain). 5 tablet 0    tobramycin-dexamethasone (TOBRADEX) 0.3-0.1 % ophthalmic suspension 3 drops 2 (Two) Times a Day. USES IN EARS           PROCEDURES  Procedures            PROGRESS, DATA ANALYSIS, CONSULTS, AND MEDICAL DECISION MAKING  All labs have been independently interpreted by me.  All radiology studies have been reviewed by me. All EKG's have been independently viewed and interpreted by me.  Discussion below represents my analysis of pertinent findings related to patient's condition, differential diagnosis, treatment plan and final disposition.    Differential diagnosis includes but is not limited to electrolyte abnormality, pneumonia, spurious lab result.    Clinical Scores:                   ED Course as of 09/19/24 2128   Thu Sep 19, 2024   2048 Potassium(!!): 2.3 [MW]   2051 EKG interpreted by me demonstrates sinus rhythm, rate of 73, no NH prolongation, no elevation, borderline QT prolongation [MW]   2052 Chest x-ray interpreted by me and demonstrates no evidence of consolidation [MW]   2058 Patient noted to be severely hypokalemic with a potassium of 2.3.  Will admit for replacement. [MW]   2108 Discussed with MyLikes HAILEY with [facility] who agrees to admit [MW]      ED Course User Index  [MW] Jose Bangura MD             AS OF 21:28 EDT VITALS:    BP - (!) 197/104  HR - 107  TEMP - 98 °F (36.7 °C) (Tympanic)  O2 SATS - 92%    COMPLEXITY OF CARE  The patient requires admission.      DIAGNOSIS  Final diagnoses:   Hypokalemia         DISPOSITION  ED Disposition       ED Disposition   Decision to Admit    Condition   --    Comment   Level of Care: Telemetry [5]   Diagnosis: Hypokalemia [779572]   Admitting  Physician: [facility] [[id number]]   Attending Physician: [facility] [[id number]]   Certification: I Certify That Inpatient Hospital Services Are Medically Necessary For Greater Than 2 Midnights                  Please note that portions of this document were completed with a voice recognition program.    Note Disclaimer: At [facility], we believe that sharing information builds trust and better relationships. You are receiving this note because you recently visited [facility]. It is possible you will see health information before a provider has talked with you about it. This kind of information can be easy to misunderstand. To help you fully understand what it means for your health, we urge you to discuss this note with your provider.         Jose Bangura MD  09/19/24 [id number]

## 2024-09-20 NOTE — CONSULTS
Subjective     REASON FOR CONSULTATION:    Evaluation and management for suspected lung cancer                             REQUESTING PHYSICIAN:  Dr. Victorina MD    RECORDS OBTAINED:  Records of the patients history including those obtained from the referring provider were reviewed and summarized in detail.    HISTORY OF PRESENT ILLNESS:  The patient is a 64 y.o. year old female with medical history significant for extensive tobacco abuse (> 40 pack years), COPD and history of SIADH/hyponatremia had presented to [facility] ER 2024 with abnormal labs and weight loss of 40 pounds.  Patient had routine lab work performed through her PCP and directed to come to the ER.    Lab work in the ER noted severe hypokalemia (2.3), metabolic alkalosis (CO2 39.1, chloride 92), normal LFTs (total bilirubin 1.3, ALT 46, ), leukocytosis of 17.19 and thrombocytopenia of 129,000.  Normal sodium (142) and creatinine.  Patient was hypertensive with BP of 197/104.  Otherwise normal vitals.    A CT C/A/P demonstrated left perihilar mass, 3.6 x 2.1 cm with significant left hilar and mediastinal lymphadenopathy, prevascular doreen conglomerate measuring 4.3 x 2.2 cm.  Left hilar lymphadenopathy causes severe narrowing of the IRIS proximal segmental bronchi.  Numerous (greater than 20) hypoenhancing bilobar hepatic lesions, largest 2.4 cm in hepatic segment 4A.  Normal bilateral adrenal glands.  No abdominal or pelvic lymphadenopathy.    Hematology/oncology has been consulted for further evaluation and management.  Past Medical History:   Diagnosis Date    Cancer     SKIN REMOVED    Cholesteatoma     BILATERAL EARS    PONV (postoperative nausea and vomiting)         Past Surgical History:   Procedure Laterality Date    BLADDER REPAIR       SECTION      INNER EAR SURGERY Bilateral     CHOLESTEOMA    VULVA BIOPSY Right 2021    Procedure: WIDE LOCAL EXCISION OF VULVA LESION;  Surgeon: Dwayne Sood MD;   "[place] [facility] [place] [facility] [place]  Service: Gynecology;  Laterality: Right;        No current facility-administered medications on file prior to encounter.     Current Outpatient Medications on File Prior to Encounter   Medication Sig Dispense Refill    [DISCONTINUED] ibuprofen (ADVIL,MOTRIN) 600 MG tablet Take 1 tablet by mouth Every 6 (Six) Hours As Needed for Mild Pain . 30 tablet 0    [DISCONTINUED] oxyCODONE-acetaminophen (PERCOCET) 5-325 MG per tablet Take 1-2 tablets by mouth Every 4 (Four) Hours As Needed (Pain). 5 tablet 0    [DISCONTINUED] tobramycin-dexamethasone (TOBRADEX) 0.3-0.1 % ophthalmic suspension 3 drops 2 (Two) Times a Day. USES IN EARS          ALLERGIES:  No Known Allergies     Social History     Socioeconomic History    Marital status:    Tobacco Use    Smoking status: Former     Types: Cigarettes     Start date:      Quit date:      Years since quittin.7    Smokeless tobacco: Never    Tobacco comments:     INST. PER ANESTHESIA PROTOCOL   Vaping Use    Vaping status: Never Used   Substance and Sexual Activity    Alcohol use: Never    Drug use: Never    Sexual activity: Defer        Family History   Problem Relation Age of Onset    Malig Hyperthermia Neg Hx         Review of Systems   As per HPI    Objective     Vitals:    24 0445 24 0638 24 0740 24 1350   BP: (!) 198/89  Comment: nurse was notified 176/89 162/93 166/73   BP Location: Right arm  Left arm Left arm   Patient Position: Sitting  Lying Lying   Pulse: 80 76 75 76   Resp: 18  18 18   Temp: 97.9 °F (36.6 °C)  97.9 °F (36.6 °C) 98.2 °F (36.8 °C)   TempSrc: Oral  Oral Oral   SpO2:  95% 95% 93%   Weight: 69.9 kg (154 lb 3.2 oz)      Height: 185.4 cm (72.99\")             No data to display                Physical Exam    CONSTITUTIONAL:  Vital signs reviewed.  No distress, looks comfortable.  EYES:  Conjunctiva and lids unremarkable.    EARS,NOSE,MOUTH,THROAT:  Ears and nose appear unremarkable.  "   RESPIRATORY:  Normal respiratory effort.   CARDIOVASCULAR:  Normal heart rate  GASTROINTESTINAL: Abdomen appears unremarkable.  Nondistended   LYMPHATIC:  No cervical, supraclavicular lymphadenopathy.  SKIN:  Warm.  No rashes.  PSYCHIATRIC:  Normal judgment and insight.  Normal mood and affect.  NEURO: AAOx3, no obvious focal deficits.      RECENT LABS:  Hematology WBC   Date Value Ref Range Status   09/20/2024 13.70 (H) 3.40 - 10.80 10*3/mm3 Final   09/19/2024 19.98 (H) 4.5 - 11.0 10*3/uL Final     RBC   Date Value Ref Range Status   09/20/2024 4.36 3.77 - 5.28 10*6/mm3 Final   09/19/2024 5.20 4.0 - 5.2 10*6/uL Final     Hemoglobin   Date Value Ref Range Status   09/20/2024 12.3 12.0 - 15.9 g/dL Final   09/19/2024 14.6 12.0 - 16.0 g/dL Final     Hematocrit   Date Value Ref Range Status   09/20/2024 36.8 34.0 - 46.6 % Final   09/19/2024 43.7 36.0 - 46.0 % Final     Platelets   Date Value Ref Range Status   09/20/2024 104 (L) 140 - 450 10*3/mm3 Final   09/19/2024 129 (L) 140 - 440 10*3/uL Final          Assessment & Plan   Patient is a pleasant 64-year-old female with medical history significant for COPD and history of SIADH/hyponatremia admitted with hypokalemia and weight loss.  Hematology/oncology consulted for suspected lung malignancy.      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    Has > 40-pack-year smoking history  The CT C/A/P demonstrated left perihilar mass, 3.6 x 2.1 cm with significant left hilar and mediastinal lymphadenopathy, prevascular doreen conglomerate measuring 4.3 x 2.2 cm.  Left hilar lymphadenopathy causes severe narrowing of the IRIS proximal segmental bronchi.  Numerous (greater than 20) hypoenhancing bilobar hepatic lesions, largest 2.4 cm in hepatic segment 4A.  Normal bilateral adrenal glands.  No abdominal or pelvic  lymphadenopathy.\  I reviewed the imaging and laboratory findings with patient in detail.  Informed her that they are highly concerning for primary lung malignancy.  Plan made to consult pulmonology for bronchoscopy and biopsy of the hilar/mediastinal lymph nodes.  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  Nephrology following    # ?  Obstructive pneumonia: On antibiotics per primary    # Mild thrombocytopenia: Monitor    Recommendations:  -Pulmonology consult for bronchoscopy and biopsy  -MRI brain  -Will continue to follow    I spent 82 minutes on this encounter, before, during & after the visit evaluating the patient, reviewing records and writing orders.

## 2024-09-20 NOTE — CONSULTS
Patient Identification:  Kranthi Farlye  64 y.o.  female  1960  [id number]          LOS 1    Requesting physician:   Royer Prajapati DO    Reason for Consultation:    Pulmonary nodules, lymphadenopathy.    History of Present Illness:   64-year-old female, former tobacco abuse, with emphysema, history of SIADH who presented with abnormal labs.    Patient was found to have hypokalemia by her primary care physician and sent to the ED for further evaluation.  Here in the hospital was found to have left hilar mass in addition to pulmonary nodule and lymphadenopathy with concerns for malignancy.  Patient does have a significant smoking history, 1 pack/day for over 40 years.  Quit a few months back.  States that he has lost approximately 30 pounds in the past several months, appetite is okay however is continuing to lose weight.  No infectious symptoms.  No significant cough or sputum production.  No significant shortness of breath with activity.  No personal history of malignancy.    Past Medical History:  Past Medical History:   Diagnosis Date    Cancer     SKIN REMOVED    Cholesteatoma     BILATERAL EARS    PONV (postoperative nausea and vomiting)        Past Surgical History:  Past Surgical History:   Procedure Laterality Date    BLADDER REPAIR       SECTION      INNER EAR SURGERY Bilateral     CHOLESTEOMA    VULVA BIOPSY Right 2021    Procedure: WIDE LOCAL EXCISION OF VULVA LESION;  Surgeon: Dwayne Sood MD;  [place] [facility] [place] [facility] [place]  Service: Gynecology;  Laterality: Right;        Home Meds:  No medications prior to admission.         Allergies:  No Known Allergies    Social History:   Social History     Socioeconomic History    Marital status:    Tobacco Use    Smoking status: Former     Types: Cigarettes     Start date:      Quit date:      Years since quittin.7    Smokeless tobacco: Never    Tobacco comments:     INST. PER ANESTHESIA PROTOCOL   Vaping Use     "Vaping status: Never Used   Substance and Sexual Activity    Alcohol use: Never    Drug use: Never    Sexual activity: Defer       Family History:  Family History   Problem Relation Age of Onset    Malig Hyperthermia Neg Hx        Review of Systems:  12 point review of systems performed and all else negative except as per HPI above.    Objective:    PHYSICAL EXAM:    /73 (BP Location: Left arm, Patient Position: Lying)   Pulse 76   Temp 98.2 °F (36.8 °C) (Oral)   Resp 18   Ht 185.4 cm (72.99\")   Wt 69.9 kg (154 lb 3.2 oz)   SpO2 93%   BMI 20.35 kg/m²  Body mass index is 20.35 kg/m². 93% 69.9 kg (154 lb 3.2 oz)    General: Alert, nontoxic, NAD  HEENT: NC/AT, EOMI, MMM  Neck: Supple, trachea midline  Cardiac: RRR, no murmur, gallops, rubs  Pulmonary: Diminished bilaterally  GI: Soft, non-tender, non-distended, normal bowel sounds  Extremities: Warm, well perfused, no LE edema  Skin: no visible rash  Neuro: CN II - XII grossly intact  Psychiatry: Normal mood and affect    Lab Review:   Results from last 7 days   Lab Units 09/20/24  1003 09/19/24  2006 09/19/24  0925   WBC 10*3/mm3 13.70* 17.19* 19.98*   HEMOGLOBIN g/dL 12.3 14.2 14.6   PLATELETS 10*3/mm3 104* 109* 129*     Results from last 7 days   Lab Units 09/20/24  1003 09/19/24  2006   SODIUM mmol/L 142 142   POTASSIUM mmol/L 3.0* 2.3*   CHLORIDE mmol/L 96* 92*   CO2 mmol/L 37.4* 39.1*   BUN mg/dL 18 19   CREATININE mg/dL 0.65 0.67   GLUCOSE mg/dL 166* 190*   CALCIUM mg/dL 9.0 9.6   MAGNESIUM mg/dL 2.0 2.0   Estimated Creatinine Clearance: 96.5 mL/min (by C-G formula based on SCr of 0.65 mg/dL).    Results from last 7 days   Lab Units 09/20/24  1003 09/20/24 0001 09/19/24 2006 09/19/24  0925   AST (SGOT) U/L  --   --  31  --    ALT (SGPT) U/L  --   --  46*  --    PROCALCITONIN ng/mL  --   --  1.75*  --    LACTATE mmol/L  --  1.2  --   --    PLATELETS 10*3/mm3 104*  --  109* 129*              Imaging reviewed  Chest imaging from this hospitalization " reviewed.       Assessment / Recommendations:    Left perihilar mass  Mediastinal and hilar lymphadenopathy  Paraneoplastic syndrome, primary hyperaldosteronism  Postobstructive pneumonia  Electrolyte derangements (hypokalemia, hypochloremia)  Metabolic alkalosis  Primary hyperaldosteronism  Leukocytosis  Thrombocytopenia  Hypertension  Unintentional weight loss  History of tobacco abuse (greater than 40-pack-year)    -Patient presented with laboratory abnormalities concerning for paraneoplastic syndrome with CT imaging demonstrating left perihilar mass with possible superimposed obstructive pneumonia.  -High risk for malignancy especially with her significant smoking history and recent weight loss.  -Plan is for Ion robotic bronchoscopy with biopsy of the left upper lobe apical lesion in addition to the left perihilar mass.  This will be performed on Monday 9/23.  -Agree with empiric antibiotics for possible postobstructive pneumonia, procalcitonin elevated, leukocytosis.  -Continue bronchodilator therapy.  -Appreciate oncology assistance, plan is for MRI of the brain for staging.    Thank you for allowing me to participate in the care of this patient.  I will continue to follow along with you.    Govind Mar MD  [facility], [facility]  Pulmonary and Critical Care Medicine, Interventional Pulmonology    9/20/2024  14:43 EDT    Parts of this note may be an electronic transcription/translation of spoken language to printed text using the Dragon dictation system.

## 2024-09-20 NOTE — PLAN OF CARE
Goal Outcome Evaluation:  Plan of Care Reviewed With: patient           Outcome Evaluation: replacing potassium still, alert and oriented X4, RA, up ad joseph, NSR, stopped IVF, EGD tomorrow at 10am, accuchecks , new DM educating complete. will cont to monitor                               Problem: Adult Inpatient Plan of Care  Goal: Plan of Care Review  Outcome: Ongoing, Progressing  Flowsheets (Taken 9/20/2024 1650)  Plan of Care Reviewed With: patient  Outcome Evaluation: replacing potassium still, alert and oriented X4, RA, up ad joseph, NSR, stopped IVF, EGD tomorrow at 10am, accuchecks , new DM educating complete. will cont to monitor  Goal: Patient-Specific Goal (Individualized)  Outcome: Ongoing, Progressing  Goal: Absence of Hospital-Acquired Illness or Injury  Outcome: Ongoing, Progressing  Intervention: Identify and Manage Fall Risk  Recent Flowsheet Documentation  Taken 9/20/2024 1648 by Cindy Moore RN  Safety Promotion/Fall Prevention:   assistive device/personal items within reach   clutter free environment maintained   nonskid shoes/slippers when out of bed   room organization consistent   safety round/check completed  Taken 9/20/2024 1400 by Cindy Moore RN  Safety Promotion/Fall Prevention:   activity supervised   assistive device/personal items within reach   clutter free environment maintained   fall prevention program maintained   nonskid shoes/slippers when out of bed   room organization consistent   safety round/check completed  Taken 9/20/2024 1252 by Cindy Moore RN  Safety Promotion/Fall Prevention:   assistive device/personal items within reach   clutter free environment maintained   nonskid shoes/slippers when out of bed   room organization consistent   safety round/check completed  Taken 9/20/2024 1000 by Cindy Moore RN  Safety Promotion/Fall Prevention:   assistive device/personal items within reach   clutter free environment maintained   nonskid shoes/slippers when out of  bed   room organization consistent   safety round/check completed  Taken 9/20/2024 0912 by Cindy Moore RN  Safety Promotion/Fall Prevention:   assistive device/personal items within reach   clutter free environment maintained   nonskid shoes/slippers when out of bed   room organization consistent   safety round/check completed  Intervention: Prevent Skin Injury  Recent Flowsheet Documentation  Taken 9/20/2024 1400 by Cindy Moore RN  Skin Protection: adhesive use limited  Taken 9/20/2024 0912 by Cindy Moore RN  Skin Protection: adhesive use limited  Intervention: Prevent and Manage VTE (Venous Thromboembolism) Risk  Recent Flowsheet Documentation  Taken 9/20/2024 1648 by Cindy Moore RN  Activity Management: activity encouraged  Taken 9/20/2024 1400 by Cindy Moore RN  Activity Management: activity encouraged  Taken 9/20/2024 1252 by Cindy Moore RN  Activity Management: activity encouraged  Taken 9/20/2024 1000 by Cindy Moore RN  Activity Management: activity encouraged  Taken 9/20/2024 0912 by Cindy Moore RN  Activity Management: activity encouraged  Intervention: Prevent Infection  Recent Flowsheet Documentation  Taken 9/20/2024 1648 by Cinyd Moore RN  Infection Prevention: single patient room provided  Taken 9/20/2024 1400 by Cindy Moore RN  Infection Prevention: single patient room provided  Taken 9/20/2024 1252 by Cindy Moore RN  Infection Prevention: single patient room provided  Taken 9/20/2024 1000 by Cindy Moore RN  Infection Prevention: single patient room provided  Taken 9/20/2024 0912 by Cindy Moore RN  Infection Prevention: single patient room provided  Goal: Optimal Comfort and Wellbeing  Outcome: Ongoing, Progressing  Intervention: Provide Person-Centered Care  Recent Flowsheet Documentation  Taken 9/20/2024 1400 by Cindy Moore RN  Trust Relationship/Rapport:   care explained   thoughts/feelings acknowledged  Taken 9/20/2024 0912 by  Cindy Moore, RN  Trust Relationship/Rapport:   care explained   thoughts/feelings acknowledged  Goal: Readiness for Transition of Care  Outcome: Ongoing, Progressing

## 2024-09-20 NOTE — ED NOTES
"Nursing report ED to floor  Kranthi Farley  64 y.o.  female    HPI :  HPI (Adult)  Stated Reason for Visit: pt states they had blood work this am and had a low potassium value and elevated wbcs and was told to come into the emergency room.  History Obtained From: patient    Chief Complaint  Chief Complaint   Patient presents with    Abnormal Lab       Admitting doctor:   Kelly Reardon MD    Admitting diagnosis:   The encounter diagnosis was Hypokalemia.    Code status:   Current Code Status       Date Active Code Status Order ID Comments User Context       9/19/2024 2115 CPR (Attempt to Resuscitate) [id number]  Lashell Ovalles, [facility]        Question Answer    Code Status (Patient has no pulse and is not breathing) CPR (Attempt to Resuscitate)    Medical Interventions (Patient has pulse or is breathing) Full                    Allergies:   Patient has no known allergies.    Isolation:   No active isolations    Intake and Output  No intake or output data in the 24 hours ending 09/19/24 2121    Weight:       09/19/24 1944   Weight: 69.9 kg (154 lb)       Most recent vitals:   Vitals:    09/19/24 1944 09/19/24 1946   BP:  (!) 197/104   Pulse: 107    Resp: 18    Temp: 98 °F (36.7 °C)    TempSrc: Tympanic    SpO2: 92%    Weight: 69.9 kg (154 lb)    Height: 185.4 cm (73\")        Active LDAs/IV Access:   Lines, Drains & Airways       Active LDAs       Name Placement date Placement time Site Days    Peripheral IV 09/19/24 2052 Anterior;Proximal;Right Forearm 09/19/24 2052  Forearm  less than 1                    Labs (abnormal labs have a star):   Labs Reviewed   COMPREHENSIVE METABOLIC PANEL - Abnormal; Notable for the following components:       Result Value    Glucose 190 (*)     Potassium 2.3 (*)     Chloride 92 (*)     CO2 39.1 (*)     ALT (SGPT) 46 (*)     Alkaline Phosphatase 143 (*)     Total Bilirubin 1.3 (*)     BUN/Creatinine Ratio 28.4 (*)     All other components within normal limits    Narrative:  "    GFR Normal >60  Chronic Kidney Disease <60  Kidney Failure <15     CBC WITH AUTO DIFFERENTIAL - Abnormal; Notable for the following components:    WBC 17.19 (*)     Platelets 109 (*)     Neutrophil % 86.1 (*)     Lymphocyte % 6.3 (*)     Monocyte % 4.7 (*)     Eosinophil % 0.0 (*)     Immature Grans % 2.7 (*)     Neutrophils, Absolute 14.81 (*)     Immature Grans, Absolute 0.46 (*)     All other components within normal limits   SINGLE HS TROPONIN T - Abnormal; Notable for the following components:    HS Troponin T 16 (*)     All other components within normal limits    Narrative:     High Sensitive Troponin T Reference Range:  <14.0 ng/L- Negative Female for AMI  <22.0 ng/L- Negative Male for AMI  >=14 - Abnormal Female indicating possible myocardial injury.  >=22 - Abnormal Male indicating possible myocardial injury.   Clinicians would have to utilize clinical acumen, EKG, Troponin, and serial changes to determine if it is an Acute Myocardial Infarction or myocardial injury due to an underlying chronic condition.        MAGNESIUM - Normal   BNP (IN-HOUSE) - Normal    Narrative:     This assay is used as an aid in the diagnosis of individuals suspected of having heart failure. It can be used as an aid in the diagnosis of acute decompensated heart failure (ADHF) in patients presenting with signs and symptoms of ADHF to the emergency department (ED). In addition, NT-proBNP of <300 pg/mL indicates ADHF is not likely.    Age Range Result Interpretation  NT-proBNP Concentration (pg/mL:      <50             Positive            >450                   Gray                 300-450                    Negative             <300    50-75           Positive            >900                  Gray                300-900                  Negative            <300      >75             Positive            >1800                  Gray                300-1800                  Negative            <300   RESPIRATORY PANEL PCR W/ COVID-19  (SARS-COV-2), NP SWAB IN UTM/VTP, 2 HR TAT   LEGIONELLA ANTIGEN, URINE   MRSA SCREEN, PCR   STREP PNEUMO AG, URINE OR CSF   URINALYSIS W/ MICROSCOPIC IF INDICATED (NO CULTURE)   PROCALCITONIN   PROTIME-INR   CBC AND DIFFERENTIAL    Narrative:     The following orders were created for panel order CBC & Differential.  Procedure                               Abnormality         Status                     ---------                               -----------         ------                     CBC Auto Differential[[id number]]        Abnormal            Final result                 Please view results for these tests on the individual orders.       EKG:   ECG 12 Lead Dyspnea   Preliminary Result   HEART RATE=73  bpm   RR Rnkiyher=064  ms   OR Lccrtgzh=717  ms   P Horizontal Axis=0  deg   P Front Axis=88  deg   QRSD Interval=99  ms   QT Rommzphi=585  ms   YRwV=774  ms   QRS Axis=61  deg   T Wave Axis=85  deg   - ABNORMAL ECG -   Sinus rhythm   Prolonged QT interval   Date and Time of Study:2024-09-19 20:45:05          Meds given in ED:   Medications   Potassium Replacement - Follow Nurse / BPA Driven Protocol (has no administration in time range)   potassium chloride (K-DUR,KLOR-CON) ER tablet 40 mEq (40 mEq Oral Given 9/19/24 2116)   nitroglycerin (NITROSTAT) SL tablet 0.4 mg (has no administration in time range)   acetaminophen (TYLENOL) tablet 650 mg (has no administration in time range)   sennosides-docusate (PERICOLACE) 8.6-50 MG per tablet 2 tablet (has no administration in time range)     And   polyethylene glycol (MIRALAX) packet 17 g (has no administration in time range)     And   bisacodyl (DULCOLAX) EC tablet 5 mg (has no administration in time range)     And   bisacodyl (DULCOLAX) suppository 10 mg (has no administration in time range)   ondansetron ODT (ZOFRAN-ODT) disintegrating tablet 4 mg (has no administration in time range)     Or   ondansetron (ZOFRAN) injection 4 mg (has no administration in time range)    sodium chloride 0.9 % with KCl 20 mEq/L infusion (has no administration in time range)       Imaging results:  No radiology results for the last day    Ambulatory status:   - Ad Yvette    Social issues:   Social History     Socioeconomic History    Marital status:    Tobacco Use    Smoking status: Every Day     Current packs/day: 1.00     Types: Cigarettes    Smokeless tobacco: Never    Tobacco comments:     INST. PER ANESTHESIA PROTOCOL   Vaping Use    Vaping status: Never Used   Substance and Sexual Activity    Alcohol use: Never    Drug use: Never    Sexual activity: Defer       Peripheral Neurovascular  Peripheral Neurovascular (Adult)  Peripheral Neurovascular WDL: WDL    Neuro Cognitive  Neuro Cognitive (Adult)  Cognitive/Neuro/Behavioral WDL: WDL, orientation  Orientation: oriented x 4    Learning  Learning Assessment (Adult)  Learning Readiness and Ability: no barriers identified    Respiratory  Respiratory (Adult)  Airway WDL: WDL  Respiratory WDL  Respiratory WDL: WDL    Abdominal Pain       Pain Assessments  Pain (Adult)  (0-10) Pain Rating: Rest: 0    NIH Stroke Scale       Stephanie Markham RN  09/19/24 21:21 EDT

## 2024-09-20 NOTE — CONSULTS
"Nutrition Services    Patient Name:  Kranthi Farley  YOB: 1960  MRN: [id number]  Admit Date:  2024    Assessment Date:  24    Summary: Unintentional wt loss/ DM2 diet education    64yoF admitted with hypokalemia. Hx of emphysema, SIADH. Reports losing ~40# over the last 3 months. States her UBW is around 195#. Noted moderate muscle wasting and fat loss per NFPE. Reports no changes in eating habits. Pt about to order dinner during interview. Reports fair appetite. Declined nutrition supplement for now. Discussed increased energy needs r/t possible lung malignancy. Hematology consulted. Pt newly dx with T2DM. Not interested in diet edu right now. Asked RD to come back at another time. Will f/u for diet edu. Labs and meds reviewed.     Labs: A1C 7.0, K 3.0  Meds: KCl    Patient meets ASPEN/AND criteria for nutrition diagnosis of severe malnutrition of chronic illness based on: 41# (22%) wt loss x 3 months, NFPE results    Plan/recs:  Encourage good PO intake.  Recommend increasing caloric and protein intake to help with wt maintenance. Nutrition supplement declined by the pt.   F/u for diet education per pt's request    RD to follow    CLINICAL NUTRITION ASSESSMENT      Reason for Assessment Education Referral, Unintentional Weight Loss     Diagnosis/Problem   Hypokalemia, newly dx T2DM   Medical/Surgical History Past Medical History:   Diagnosis Date    Cancer     SKIN REMOVED    Cholesteatoma     BILATERAL EARS    PONV (postoperative nausea and vomiting)        Past Surgical History:   Procedure Laterality Date    BLADDER REPAIR       SECTION      INNER EAR SURGERY Bilateral     CHOLESTEOMA    VULVA BIOPSY Right 2021    Procedure: WIDE LOCAL EXCISION OF VULVA LESION;  Surgeon: Dwayne Sood MD;  Location: [facility] OR [facility];  Service: Gynecology;  Laterality: Right;        Anthropometrics        Current Height  Current Weight  BMI kg/m2 Height: 185.4 cm (72.99\")  Weight: " 69.9 kg (154 lb 3.2 oz) (09/20/24 0445)  Body mass index is 20.35 kg/m².   Adjusted BMI (if applicable)    BMI Category Normal/Healthy (18.4 - 24.9)   Ideal Body Weight (IBW) 166#   Usual Body Weight (UBW) 195# per pt   Weight Trend Loss   Weight History Wt Readings from Last 30 Encounters:   09/20/24 0445 69.9 kg (154 lb 3.2 oz)   09/19/24 1944 69.9 kg (154 lb)   11/12/21 0658 91.3 kg (201 lb 4.5 oz)   11/10/21 0845 90.7 kg (200 lb)        Estimated Requirements         Weight used  69.9 kg    Calories  2097 -2447 kcal (30-35 kcal/kg)    Protein  84 - 105 g (1.2 - 1.5 gm/kg)    Fluid   (1 mL/kcal)     Labs       Pertinent Labs    Results from last 7 days   Lab Units 09/20/24  1003 09/19/24  2006   SODIUM mmol/L 142 142   POTASSIUM mmol/L 3.0* 2.3*   CHLORIDE mmol/L 96* 92*   CO2 mmol/L 37.4* 39.1*   BUN mg/dL 18 19   CREATININE mg/dL 0.65 0.67   CALCIUM mg/dL 9.0 9.6   BILIRUBIN mg/dL  --  1.3*   ALK PHOS U/L  --  143*   ALT (SGPT) U/L  --  46*   AST (SGOT) U/L  --  31   GLUCOSE mg/dL 166* 190*     Results from last 7 days   Lab Units 09/20/24  1003 09/19/24 2006   MAGNESIUM mg/dL 2.0 2.0   HEMOGLOBIN g/dL 12.3 14.2   HEMATOCRIT % 36.8 42.0   WBC 10*3/mm3 13.70* 17.19*   ALBUMIN g/dL  --  3.9     Results from last 7 days   Lab Units 09/20/24  1003 09/20/24  0001 09/19/24 2006 09/19/24  0925   INR   --  1.12*  --   --    PLATELETS 10*3/mm3 104*  --  109* 129*     COVID19   Date Value Ref Range Status   09/19/2024 Not Detected Not Detected - Ref. Range Final     Lab Results   Component Value Date    HGBA1C 7.5 (H) 09/19/2024          Medications           Scheduled Medications azithromycin, 500 mg, Intravenous, Q24H  budesonide-formoterol, 2 puff, Inhalation, BID - RT  cefTRIAXone, 2,000 mg, Intravenous, Q24H  enoxaparin, 40 mg, Subcutaneous, Q24H  insulin lispro, 2-7 Units, Subcutaneous, 4x Daily AC & at Bedtime  potassium chloride ER, 40 mEq, Oral, Q4H  spironolactone, 50 mg, Oral, BID  tiotropium bromide  monohydrate, 2 puff, Inhalation, Daily - RT       Infusions Pharmacy Consult,        PRN Medications   acetaminophen    senna-docusate sodium **AND** polyethylene glycol **AND** bisacodyl **AND** bisacodyl    Calcium Replacement - Follow Nurse / BPA Driven Protocol    dextrose    dextrose    glucagon (human recombinant)    ipratropium-albuterol    Magnesium Standard Dose Replacement - Follow Nurse / BPA Driven Protocol    nitroglycerin    ondansetron ODT **OR** ondansetron    Pharmacy Consult    Phosphorus Replacement - Follow Nurse / BPA Driven Protocol    Potassium Replacement - Follow Nurse / BPA Driven Protocol    Potassium Replacement - Follow Nurse / BPA Driven Protocol     Physical Findings          General Findings alert, oriented   Oral/Mouth Cavity tooth or teeth missing   Edema  no edema   Gastrointestinal WDL, last bowel movement: 9/19   Skin  bruising   Tubes/Drains/Lines none   NFPE See Malnutrition Severity Assessment, Date Completed: 9/20 GR     Malnutrition Severity Assessment      Patient meets criteria for : Severe Malnutrition  Malnutrition Type (Last 8 Hours)       Malnutrition Severity Assessment       Row Name 09/20/24 1541       Malnutrition Severity Assessment    Malnutrition Type Chronic Disease - Related Malnutrition      Row Name 09/20/24 1541       Unintentional Weight Loss     Unintentional Weight Loss Findings Severe    Unintentional Weight Loss  Weight loss greater than 7.5% in three months      Row Name 09/20/24 1541       Muscle Loss    Loss of Muscle Mass Findings Severe    [place] Moderate - slight depression    Clavicle Bone Region Severe - protruding prominent bone    Acromion Bone Region Moderate - acromion may slightly protrude    Scapular Bone Region Moderate - mild depression, bones may show slightly    Dorsal Hand Region Moderate - slight depression    Patellar Region Moderate - patella more prominent, less muscle definition around patella    Anterior Thigh Region  Moderate - mild depression on inner thigh    Posterior Calf Region Moderate - some roundness, slight firmness      Row Name 09/20/24 1541       Fat Loss    Subcutaneous Fat Loss Findings Moderate    Orbital Region  Moderate -  somewhat hollowness, slightly dark circles    Upper Arm Region Moderate - some fat tissue, not ample    Thoracic & Lumbar Region Moderate - ribs visible with mild depressions, iliac crest somewhat prominent      Row Name 09/20/24 1541       Criteria Met (Must meet criteria for severity in at least 2 of these categories: M Wasting, Fat Loss, Fluid, Secondary Signs, Wt. Status, Intake)    Patient meets criteria for  Severe Malnutrition                       Current Nutrition Orders & Evaluation of Intake       Oral Nutrition     Food Allergies NKFA   Current PO Diet Diet: Cardiac, Diabetic; Healthy Heart (2-3 Na+); Consistent Carbohydrate; Fluid Consistency: Thin (IDDSI 0)   Supplement n/a   PO Evaluation     % PO Intake 75%    Factors Affecting Intake: decreased appetite, emotional status   --  PES STATEMENT / NUTRITION DIAGNOSIS      Nutrition Dx Problem  Problem: Malnutrition (severe)  Etiology: Medical Diagnosis - possible lung malignancy    Signs/Symptoms: NFPE Results, Unintended Weight Change, and Report/Observation     NUTRITION INTERVENTION / PLAN OF CARE      Intervention Goal(s) Establish goals of care, Reduce/improve symptoms, Meet estimated needs, Disease management/therapy, Establish PO intake, Increase intake, No significant weight loss, and PO intake goal %: >75%         RD Intervention/Action Supplement offered/declined, Encourage intake, Continue to monitor, Care plan reviewed, and Education regarding: DM2   --      Prescription/Orders:       PO Diet       Supplements       Enteral Nutrition       Parenteral Nutrition    New Prescription Ordered? No, Recommended, No changes at this time   --      Monitor/Evaluation Per protocol   Discharge Plan/Needs Pending clinical course    --    RD to follow per protocol.      Electronically signed by:  Cherelle Jorgensen RD  09/20/24 15:30 EDT

## 2024-09-20 NOTE — PAYOR COMM NOTE
"Janet Yo (64 y.o. Female)        PLEASE SEE ATTACHED FOR INPT AUTH.     REF#[id number]     PLEASE CALL  OR  [phone] WITH INPT AUTH.    THANK YOU    BRITTANY ROSSI ROBERT [facility]     Date of Birth   1960    Social Security Number       Address   [address] [place] [address]    Home Phone   [phone]    MRN   [id number]       Catholic   None    Marital Status                               Admission Date   9/19/24    Admission Type   Emergency    Admitting Provider   Kelly Reardon MD    Attending Provider   Royer Prajapati DO    Department, Room/Bed   [address], N644/1       Discharge Date       Discharge Disposition       Discharge Destination                                 Attending Provider: Royer Prajapait DO    Allergies: No Known Allergies    Isolation: None   Infection: None   Code Status: CPR    Ht: 185.4 cm (72.99\")   Wt: 69.9 kg (154 lb 3.2 oz)    Admission Cmt: None   Principal Problem: Hypokalemia [E87.6]                   Active Insurance as of 9/19/2024       Primary Coverage       Payor Plan Insurance Group Employer/Plan Group    [employer]HEM BLUE CROSS ANTHEM BLUE CROSS BLUE SHIELD PPO [id number]       Payor Plan Address Payor Plan Phone Number Payor Plan Fax Number Effective Dates    [address] [phone]  1/1/2018 - None Entered    [facility] [id number]         Subscriber Name Subscriber Birth Date Member ID       JANET YO 1960 [id number]                     Emergency Contacts        (Rel.) Home Phone Work Phone Mobile Phone    Coreen Isaac (Daughter) -- -- [phone]    Bobbi Isaac (Daughter) -- -- [phone]    LUCIANO YO (Spouse) -- -- [phone]              Luther: NPI [id number]  Tax ID [id number]     History & Physical        Lashell Ovalles, DEMARCO at 09/19/24 2118       Attestation signed by Kelly Reardon MD at 09/20/24 0136 (Updated)    I have reviewed the " "history and plan as obtained by DEMARCO Oneill. I have personally examined the patient. I have reviewed available clinical results, imaging results, EKG/Telemetry results, and prior records and performed greater than 50% of the work for this patient. My exam confirms their physical findings and I agree with the plan as listed above, with the addition of the followin-year-old lady with history of COPD/emphysema, history of SIADH presenting with abnormal labs.  Patient reports that she has been feeling \"not herself\" for the last 3 to 4 months.  She says that she has been feeling generalized malaise and has lost about 40 pounds in the last 3 months.  She made an appointment for a physician to discuss this and is a ordered labs, she was noted to have significant hypokalemia with elevated white blood cells.  She was contacted by her PCP and told to report to the ER for abnormal labs.  She reports a chronic cough and attributes it to her emphysema.     Constitutional:       General: Patient is not in acute distress.     Appearance: Chronically ill appearance. Not toxic-appearing.   HENT:      Head: Normocephalic and atraumatic.   Cardiovascular:      Rate and Rhythm: Normal rate and regular rhythm.   Pulmonary:      Effort: Pulmonary effort is normal. No respiratory distress.      Breath sounds: Normal breath sounds. No wheezing or rhonchi.   Abdominal:      General: Bowel sounds are normal. There is no distension.      Palpations: Abdomen is soft.      Tenderness: There is no abdominal tenderness. There is no guarding or rebound.   Musculoskeletal:         General: No swelling.      Right lower leg: No edema.      Left lower leg: No edema.   Skin:     General: Skin is warm and dry.   Neurological:      General: No focal deficit present.      Mental Status: Alert and oriented to person, place, and time.     Hypokalemia  Metabolic alkalosis  - pt with low K+, very high BP on arrival, metabolic alkalosis- " check plasma renin/aldosterone in am  - pt denies any use of diuretic or laxative  - replace potassium per protocol  - if persistent acid-base disturbance - consider renal consult     Pneumonia  -Left upper lobe infiltrate, possible subtle right upper lobe infiltrate  -WBC of 17; procal elevated  -Patient does report coughing although she is unsure if it is more than baseline  -Has been started on antibiotics, complete short course    Unexplained weight loss  - pt reports losing 40 pounds in ~3-4 months; wasn't trying to lose weight/didn't change eating habits  - given extensive smoking hx, hx of prior squamous cell cancer will check for underlying malignancy    Type 2 diabetes  -New diagnosis, diabetes educator, start sliding scale insulin while admitted, will need medication at discharge    COPD/emphysema  - no inhalers on home med list  - pharmacy consult to figure out which inhalers her insurance would cover at [place]  - in the meantime start LABA-ICS/LAMA    History of severe hyponatremia  -Admitted several months ago for severe hyponatremia down to 113, diagnosed with SIADH, had been on salt tablets but was taken off of them  -Sodium normal today    Hx of squamous carcinoma of vulva     Patient evaluated/examined prior to midnight, documentation delayed due to patient care.                      Patient Name:  Kranthi Farley  YOB: 1960  MRN:  [id number]  Admit Date:  9/19/2024  Patient Care Team:  Alexandra Keller PA-C as PCP - General (Physician Assistant)      Subjective   History Present Illness     Chief Complaint   Patient presents with    Abnormal Lab       History of Present Illness  Ms. Farley is a female with a past medical history of emphysema and hyponatremia presents to [facility] ED per instructions of primary care provider for low potassium level and  white blood cell count was that completed yesterday.  Patient reports that she does have generalized malaise and  shortness of breath that is baseline for her.  She reports that she has had a 40 pound weight loss since April and her primary care provider is following that.  She reports that she has not had a change in her appetite or any change in the amount of fluid that she is eating and drinking.  She reports that she has not had any sick contacts.  She denies dizziness, headache, chest pain, abdominal pain, nausea, vomiting, diarrhea, difficulty urinating, or difficulty ambulating.  She does report that her legs have been weak she attributes that to her 40 pound weight loss.    Review of Systems   Constitutional:  Positive for unexpected weight change. Negative for appetite change and fever.   Respiratory:  Positive for shortness of breath.    Cardiovascular:  Negative for chest pain.   Gastrointestinal:  Negative for abdominal pain, diarrhea, nausea and vomiting.   Genitourinary:  Negative for difficulty urinating.   Neurological:  Negative for dizziness and headaches.        Personal History     Past Medical History:   Diagnosis Date    Cancer     SKIN REMOVED    Cholesteatoma     BILATERAL EARS    PONV (postoperative nausea and vomiting)      Past Surgical History:   Procedure Laterality Date    BLADDER REPAIR       SECTION      INNER EAR SURGERY Bilateral     CHOLESTEOMA    VULVA BIOPSY Right 2021    Procedure: WIDE LOCAL EXCISION OF VULVA LESION;  Surgeon: Dwayne Sood MD;  [place] [facility] [place] [facility] [place]  Service: [place]necology;  Laterality: Right;     Family History   Problem Relation Age of Onset    Malig Hyperthermia Neg Hx      Social History     Tobacco Use    Smoking status: Every Day     Current packs/day: 1.00     Types: Cigarettes    Smokeless tobacco: Never    Tobacco comments:     INST. PER ANESTHESIA PROTOCOL   Vaping Use    Vaping status: Never Used   Substance Use Topics    Alcohol use: Never    Drug use: Never     No current facility-administered medications on file prior to encounter.      Current Outpatient Medications on File Prior to Encounter   Medication Sig Dispense Refill    ibuprofen (ADVIL,MOTRIN) 600 MG tablet Take 1 tablet by mouth Every 6 (Six) Hours As Needed for Mild Pain . 30 tablet 0    oxyCODONE-acetaminophen (PERCOCET) 5-325 MG per tablet Take 1-2 tablets by mouth Every 4 (Four) Hours As Needed (Pain). 5 tablet 0    tobramycin-dexamethasone (TOBRADEX) 0.3-0.1 % ophthalmic suspension 3 drops 2 (Two) Times a Day. USES IN EARS       No Known Allergies    Objective    Objective     Vital Signs  Temp:  [98 °F (36.7 °C)] 98 °F (36.7 °C)  Heart Rate:  [107] 107  Resp:  [18] 18  BP: (197)/(104) 197/104  SpO2:  [92 %] 92 %  on   ;   Device (Oxygen Therapy): room air  Body mass index is 20.32 kg/m².    Physical Exam  Vitals and nursing note reviewed.   Constitutional:       General: She is not in acute distress.     Appearance: She is not ill-appearing.   Cardiovascular:      Rate and Rhythm: Normal rate and regular rhythm.      Pulses: Normal pulses.      Heart sounds: Normal heart sounds.   Pulmonary:      Effort: Pulmonary effort is normal. No respiratory distress.      Breath sounds: Decreased breath sounds present.      Comments: Decreased breath sounds to all lobes  Abdominal:      General: Bowel sounds are normal. There is no distension.      Palpations: Abdomen is soft.      Tenderness: There is no abdominal tenderness.   Musculoskeletal:         General: Normal range of motion.      Right lower leg: No edema.      Left lower leg: No edema.   Skin:     General: Skin is warm and dry.      Coloration: Skin is pale.   Neurological:      Mental Status: She is oriented to person, place, and time.         Results Review:  I reviewed the patient's new clinical results.  I reviewed the patient's new imaging results.  I reviewed the patient's other test results.  Discussed with ED provider.    Lab Results (last 24 hours)       Procedure Component Value Units Date/Time    COMPREHENSIVE  METABOLIC PANEL [[id number]]  (Abnormal) Collected: 09/19/24 0925     Updated: 09/19/24 1943    LIPID PANEL [[id number]]  (Abnormal) Collected: 09/19/24 0925     Updated: 09/19/24 1943    HEMOGLOBIN A1C [[id number]]  (Abnormal) Collected: 09/19/24 0925     Updated: 09/19/24 1943     Hemoglobin A1C 7.5 %      Mean Bld Glu Estim. 168 mg/dL     CBC AND DIFFERENTIAL [[id number]]  (Abnormal) Collected: 09/19/24 0925     Updated: 09/19/24 1943     WBC 19.98 10*3/uL      RBC 5.20 10*6/uL      Hemoglobin 14.6 g/dL      Hematocrit 43.7 %      MCV 84.0 fL      MCH 28.1 pg      MCHC 33.4 g/dL      RDW 14.0 %      Platelets 129 10*3/uL      MPV 9.2 fL      Differential Type       Physician Office CBC w/AutoDiff     (arb'U)     Neutrophil Rel % 87.2 %      Basophil Rel % 0.2 %      Basophils Absolute 0.04 10*3/uL      Eosinophil % 0.1 %      Eosinophils Absolute 0.02 10*3/uL      Immature Grans % 3.0 %      Immature Grans, Absolute 0.59 10*3/uL      Lymphocytes Absolute 1.05 10*3/uL      Lymphocyte Rel % 5.3 %      Monocytes Absolute 0.84 10*3/uL      Monocyte Rel % 4.2 %      Neutrophils Absolute 17.44 10*3/uL     TSH [[id number]] Collected: 09/19/24 0925     Updated: 09/19/24 1943    VITAMIN D,25-HYDROXY [[id number]]  (Abnormal) Collected: 09/19/24 0925     Updated: 09/19/24 1943     25 Hydroxy, Vitamin D 16.1 ng/mL     VITAMIN B12 [[id number]] Collected: 09/19/24 0925     Updated: 09/19/24 1943     Vitamin B-12 283 pg/mL     CBC & Differential [[id number]]  (Abnormal) Collected: 09/19/24 2006    Specimen: Blood from Arm, Left Updated: 09/19/24 2020    Narrative:      The following orders were created for panel order CBC & Differential.  Procedure                               Abnormality         Status                     ---------                               -----------         ------                     CBC Auto Differential[[id number]]        Abnormal            Final result                 Please view results for these tests on  the individual orders.    Comprehensive Metabolic Panel [[id number]]  (Abnormal) Collected: 09/19/24 2006    Specimen: Blood from Arm, Left Updated: 09/19/24 2048     Glucose 190 mg/dL      BUN 19 mg/dL      Creatinine 0.67 mg/dL      Sodium 142 mmol/L      Potassium 2.3 mmol/L      Chloride 92 mmol/L      CO2 39.1 mmol/L      Calcium 9.6 mg/dL      Total Protein 6.7 g/dL      Albumin 3.9 g/dL      ALT (SGPT) 46 U/L      AST (SGOT) 31 U/L      Alkaline Phosphatase 143 U/L      Total Bilirubin 1.3 mg/dL      Globulin 2.8 gm/dL      A/G Ratio 1.4 g/dL      BUN/Creatinine Ratio 28.4     Anion Gap 10.9 mmol/L      eGFR 97.7 mL/min/1.73     Narrative:      GFR Normal >60  Chronic Kidney Disease <60  Kidney Failure <15      Magnesium [[id number]]  (Normal) Collected: 09/19/24 2006    Specimen: Blood from Arm, Left Updated: 09/19/24 2044     Magnesium 2.0 mg/dL     CBC Auto Differential [[id number]]  (Abnormal) Collected: 09/19/24 2006    Specimen: Blood from Arm, Left Updated: 09/19/24 2020     WBC 17.19 10*3/mm3      RBC 4.99 10*6/mm3      Hemoglobin 14.2 g/dL      Hematocrit 42.0 %      MCV 84.2 fL      MCH 28.5 pg      MCHC 33.8 g/dL      RDW 14.2 %      RDW-SD 42.6 fl      MPV 8.4 fL      Platelets 109 10*3/mm3      Neutrophil % 86.1 %      Lymphocyte % 6.3 %      Monocyte % 4.7 %      Eosinophil % 0.0 %      Basophil % 0.2 %      Immature Grans % 2.7 %      Neutrophils, Absolute 14.81 10*3/mm3      Lymphocytes, Absolute 1.08 10*3/mm3      Monocytes, Absolute 0.81 10*3/mm3      Eosinophils, Absolute 0.00 10*3/mm3      Basophils, Absolute 0.03 10*3/mm3      Immature Grans, Absolute 0.46 10*3/mm3      nRBC 0.0 /100 WBC     Single High Sensitivity Troponin T [[id number]]  (Abnormal) Collected: 09/19/24 2006    Specimen: Blood from Arm, Left Updated: 09/19/24 2053     HS Troponin T 16 ng/L     Narrative:      High Sensitive Troponin T Reference Range:  <14.0 ng/L- Negative Female for AMI  <22.0 ng/L- Negative Male for  AMI  >=14 - Abnormal Female indicating possible myocardial injury.  >=22 - Abnormal Male indicating possible myocardial injury.   Clinicians would have to utilize clinical acumen, EKG, Troponin, and serial changes to determine if it is an Acute Myocardial Infarction or myocardial injury due to an underlying chronic condition.         BNP [[id number]]  (Normal) Collected: 09/19/24 2006    Specimen: Blood from Arm, Left Updated: 09/19/24 2053     proBNP 709.0 pg/mL     Narrative:      This assay is used as an aid in the diagnosis of individuals suspected of having heart failure. It can be used as an aid in the diagnosis of acute decompensated heart failure (ADHF) in patients presenting with signs and symptoms of ADHF to the emergency department (ED). In addition, NT-proBNP of <300 pg/mL indicates ADHF is not likely.    Age Range Result Interpretation  NT-proBNP Concentration (pg/mL:      <50             Positive            >450                   Gray                 300-450                    Negative             <300    50-75           Positive            >900                  Gray                300-900                  Negative            <300      >75             Positive            >1800                  Gray                300-1800                  Negative            <300    Procalcitonin [[id number]] Collected: 09/19/24 2006    Specimen: Blood from Arm, Left Updated: 09/19/24 2121            Imaging Results (Last 24 Hours)       Procedure Component Value Units Date/Time    XR Chest 1 View [[id number]] Collected: 09/19/24 2050     Updated: 09/19/24 2055    Narrative:      XR CHEST 1 VW-     Clinical: Shortness of breath     COMPARISON: None     FINDINGS: Cardiac size within normal limits. There is atherosclerotic  calcification of the aorta. There is a moderate amount of left upper  lobe infiltrate seen, consistent with pneumonia. Very subtle right upper  lobe infiltrate is suggested. No pleural effusion or  pulmonary edema is  demonstrated. Advise short-term follow-up to document clearing.  Remainder is unremarkable.     This report was finalized on 9/19/2024 8:51 PM by Dr. Keo Aragon M.D  on Workstation: [id number]                 ECG 12 Lead Dyspnea   Preliminary Result   HEART RATE=73  bpm   RR Uvhooyjf=806  ms   AR Uuevyerx=886  ms   P Horizontal Axis=0  deg   P Front Axis=88  deg   QRSD Interval=99  ms   QT Eugzmdry=595  ms   HMfZ=519  ms   QRS Axis=61  deg   T Wave Axis=85  deg   - ABNORMAL ECG -   Sinus rhythm   Prolonged QT interval   Date and Time of Study:2024-09-19 20:45:05        Assessment/Plan   Assessment & Plan   Active Hospital Problems    Diagnosis  POA    **Hypokalemia [E87.6]  Yes    Leukocytosis [D72.829]  Yes    Thrombocytopenia [D69.6]  Yes    CAP (community acquired pneumonia) [J18.9]  Yes    Type 2 diabetes mellitus with hyperglycemia, without long-term current use of insulin [E11.65]  Yes      Resolved Hospital Problems   No resolved problems to display.       Ms. Farley is a female with a past medical history of emphysema and hyponatremia presents to [facility] ED per instructions of primary care provider for low potassium level and  white blood cell count was that completed yesterday.      Hypokalemia  -Potassium noted to be 2.3 on admission  -Will replace potassium per protocol  -Recheck BMP in the morning  -Telemetry monitoring    Leukocytosis  Community-acquired pneumonia  -WBC 17.19  -Chest x-ray-moderate left upper lobe infiltrate consistent with pneumonia, subtle right upper lobe infiltrate   -Afebrile on admission to the ED   -Will check respiratory viral panel, MRSA PCR, strep pneumo, and Legionella   -Check lactic acid  -Will check urinalysis and blood cultures   -Will order ceftriaxone and azithromycin   -DuoNebs as needed  -Continuous pulse oximetry   -Supplemental oxygen to maintain O2 sats greater than 90%  -Repeat CBC in the morning      Type 2 diabetes  mellitus without long term current use of insulin with hyperglycemia   -Patient reports a 40 pound unintentional weight loss since April  -Most recent A1c: 7.5 today at her PCPs office  -Not currently on any oral medications for diabetes  -Glucose checks before meals and at bedtime  -Correctional sliding scale insulin for hyperglycemia  -Consult diabetes educator  -Consult nutrition  -Healthy heart consistent carb diet      Thrombocytopenia  -Platelets noted to be 109  -Will check PT/INR  -Will monitor for bleeding  -Fall precautions  -Recheck CBC in the morning      SCDs for DVT prophylaxis.  Full code.  Discussed with patient.      DEMARCO Goodson  [facility]  09/19/24  21:28 EDT        Electronically signed by Kelly Reardon MD at 09/20/24 0136          Emergency Department Notes        Stephanie Markham RN at 09/19/24 2121            Jose Bangura MD at 09/19/24 2028           EMERGENCY DEPARTMENT ENCOUNTER  Room Number:  12/12  PCP: Alexandra Keller PA-C  Independent Historians: Patient      HPI:  Chief Complaint: had concerns including Abnormal Lab.     Context: Kranthi Farley is a 64 y.o. female with a medical history of cancer who presents to the ED c/o acute laboratory abnormality.  Patient had lab work done by primary care yesterday and was contacted this morning and told to come to the emergency department due to low potassium and high white blood cell count.  Patient states she does have some generalized malaise and some shortness of breath which she attributes to her emphysema.  Patient denies chest pain, palpitations.      Review of prior external notes (non-ED) -and- Review of prior external test results outside of this encounter: Progress note from earlier today reviewed and notable for presentation secondary to fatigue and weight loss.  Plan at that time was to obtain outpatient laboratory evaluation.  Patient was contacted regarding laboratory abnormalities  and instructed to come to the ER.    Prescription drug monitoring program review:         PAST MEDICAL HISTORY  Active Ambulatory Problems     Diagnosis Date Noted    Vulvar dysplasia 2021     Resolved Ambulatory Problems     Diagnosis Date Noted    No Resolved Ambulatory Problems     Past Medical History:   Diagnosis Date    Cancer     Cholesteatoma     PONV (postoperative nausea and vomiting)          PAST SURGICAL HISTORY  Past Surgical History:   Procedure Laterality Date    BLADDER REPAIR       SECTION      INNER EAR SURGERY Bilateral     CHOLESTEOMA    VULVA BIOPSY Right 2021    Procedure: WIDE LOCAL EXCISION OF VULVA LESION;  Surgeon: Dwayne Sood MD;  Location: [facility] [place];  Service: Gynecology;  Laterality: Right;         FAMILY HISTORY  Family History   Problem Relation Age of Onset    Malig Hyperthermia Neg Hx          SOCIAL HISTORY  Social History     Socioeconomic History    Marital status:    Tobacco Use    Smoking status: Every Day     Current packs/day: 1.00     Types: Cigarettes    Smokeless tobacco: Never    Tobacco comments:     INST. PER ANESTHESIA PROTOCOL   Vaping Use    Vaping status: Never Used   Substance and Sexual Activity    Alcohol use: Never    Drug use: Never    Sexual activity: Defer         ALLERGIES  Patient has no known allergies.      REVIEW OF SYSTEMS  Review of Systems  Included in HPI  All systems reviewed and negative except for those discussed in HPI.      PHYSICAL EXAM    I have reviewed the triage vital signs and nursing notes.    ED Triage Vitals   Temp Heart Rate Resp BP SpO2   24   98 °F (36.7 °C) 107 18 (!) 197/104 92 %      Temp src Heart Rate Source Patient Position BP Location FiO2 (%)   24 -- -- -- --   Tympanic           Physical Exam  GENERAL: alert, no acute distress  SKIN: Warm, dry  HENT: Normocephalic, atraumatic  EYES: no scleral icterus  CV:  regular rhythm, regular rate  RESPIRATORY: normal effort, lungs clear  ABDOMEN: soft, nontender, nondistended  MUSCULOSKELETAL: no deformity  NEURO: alert, moves all extremities, follows commands            LAB RESULTS  Recent Results (from the past 24 hour(s))   HEMOGLOBIN A1C    Collection Time: 09/19/24  9:25 AM    Specimen type and source: Whole Blood, Blood specimen from patient (specimen)   Result Value Ref Range    Hemoglobin A1C 7.5 (H) 4.3 - 5.6 %    Mean Bld Glu Estim. 168 mg/dL   CBC AND DIFFERENTIAL    Collection Time: 09/19/24  9:25 AM    Specimen type and source: Whole Blood, Blood specimen from patient (specimen)   Result Value Ref Range    WBC 19.98 (H) 4.5 - 11.0 10*3/uL    RBC 5.20 4.0 - 5.2 10*6/uL    Hemoglobin 14.6 12.0 - 16.0 g/dL    Hematocrit 43.7 36.0 - 46.0 %    MCV 84.0 80.0 - 100.0 fL    MCH 28.1 26.0 - 34.0 pg    MCHC 33.4 31.0 - 37.0 g/dL    RDW 14.0 12.0 - 16.8 %    Platelets 129 (L) 140 - 440 10*3/uL    MPV 9.2 8.4 - 12.4 fL    Differential Type Physician Office CBC w/AutoDiff (arb'U)    Neutrophil Rel % 87.2 (H) 45 - 80 %    Basophil Rel % 0.2 0 - 2 %    Basophils Absolute 0.04 0.0 - 0.2 10*3/uL    Eosinophil % 0.1 0 - 7 %    Eosinophils Absolute 0.02 0.0 - 0.8 10*3/uL    Immature Grans % 3.0 (H) 0.0 - 1.0 %    Immature Grans, Absolute 0.59 (H) 0.00 - 0.10 10*3/uL    Lymphocytes Absolute 1.05 0.7 - 5.5 10*3/uL    Lymphocyte Rel % 5.3 (L) 15 - 50 %    Monocytes Absolute 0.84 0.0 - 1.7 10*3/uL    Monocyte Rel % 4.2 0 - 15 %    Neutrophils Absolute 17.44 (H) 2.0 - 8.8 10*3/uL   VITAMIN D,25-HYDROXY    Collection Time: 09/19/24  9:25 AM    Specimen type and source: Serum, Blood specimen from patient (specimen)   Result Value Ref Range    25 Hydroxy, Vitamin D 16.1 (L) >30 ng/mL   VITAMIN B12    Collection Time: 09/19/24  9:25 AM    Specimen type and source: Serum, Blood specimen from patient (specimen)   Result Value Ref Range    Vitamin B-12 283 >180 pg/mL   Comprehensive Metabolic  Panel    Collection Time: 09/19/24  8:06 PM    Specimen: Arm, Left; Blood   Result Value Ref Range    Glucose 190 (H) 65 - 99 mg/dL    BUN 19 8 - 23 mg/dL    Creatinine 0.67 0.57 - 1.00 mg/dL    Sodium 142 136 - 145 mmol/L    Potassium 2.3 (C) 3.5 - 5.2 mmol/L    Chloride 92 (L) 98 - 107 mmol/L    CO2 39.1 (H) 22.0 - 29.0 mmol/L    Calcium 9.6 8.6 - 10.5 mg/dL    Total Protein 6.7 6.0 - 8.5 g/dL    Albumin 3.9 3.5 - 5.2 g/dL    ALT (SGPT) 46 (H) 1 - 33 U/L    AST (SGOT) 31 1 - 32 U/L    Alkaline Phosphatase 143 (H) 39 - 117 U/L    Total Bilirubin 1.3 (H) 0.0 - 1.2 mg/dL    Globulin 2.8 gm/dL    A/G Ratio 1.4 g/dL    BUN/Creatinine Ratio 28.4 (H) 7.0 - 25.0    Anion Gap 10.9 5.0 - 15.0 mmol/L    eGFR 97.7 >60.0 mL/min/1.73   Magnesium    Collection Time: 09/19/24  8:06 PM    Specimen: Arm, Left; Blood   Result Value Ref Range    Magnesium 2.0 1.6 - 2.4 mg/dL   CBC Auto Differential    Collection Time: 09/19/24  8:06 PM    Specimen: Arm, Left; Blood   Result Value Ref Range    WBC 17.19 (H) 3.40 - 10.80 10*3/mm3    RBC 4.99 3.77 - 5.28 10*6/mm3    Hemoglobin 14.2 12.0 - 15.9 g/dL    Hematocrit 42.0 34.0 - 46.6 %    MCV 84.2 79.0 - 97.0 fL    MCH 28.5 26.6 - 33.0 pg    MCHC 33.8 31.5 - 35.7 g/dL    RDW 14.2 12.3 - 15.4 %    RDW-SD 42.6 37.0 - 54.0 fl    MPV 8.4 6.0 - 12.0 fL    Platelets 109 (L) 140 - 450 10*3/mm3    Neutrophil % 86.1 (H) 42.7 - 76.0 %    Lymphocyte % 6.3 (L) 19.6 - 45.3 %    Monocyte % 4.7 (L) 5.0 - 12.0 %    Eosinophil % 0.0 (L) 0.3 - 6.2 %    Basophil % 0.2 0.0 - 1.5 %    Immature Grans % 2.7 (H) 0.0 - 0.5 %    Neutrophils, Absolute 14.81 (H) 1.70 - 7.00 10*3/mm3    Lymphocytes, Absolute 1.08 0.70 - 3.10 10*3/mm3    Monocytes, Absolute 0.81 0.10 - 0.90 10*3/mm3    Eosinophils, Absolute 0.00 0.00 - 0.40 10*3/mm3    Basophils, Absolute 0.03 0.00 - 0.20 10*3/mm3    Immature Grans, Absolute 0.46 (H) 0.00 - 0.05 10*3/mm3    nRBC 0.0 0.0 - 0.2 /100 WBC   Single High Sensitivity Troponin T    Collection  Time: 09/19/24  8:06 PM    Specimen: Arm, Left; Blood   Result Value Ref Range    HS Troponin T 16 (H) <14 ng/L   BNP    Collection Time: 09/19/24  8:06 PM    Specimen: Arm, Left; Blood   Result Value Ref Range    proBNP 709.0 0.0 - 900.0 pg/mL   ECG 12 Lead Dyspnea    Collection Time: 09/19/24  8:45 PM   Result Value Ref Range    QT Interval 484 ms    QTC Interval 534 ms         RADIOLOGY  XR Chest 1 View    Result Date: 9/19/2024  XR CHEST 1 VW-  Clinical: Shortness of breath  COMPARISON: None  FINDINGS: Cardiac size within normal limits. There is atherosclerotic calcification of the aorta. There is a moderate amount of left upper lobe infiltrate seen, consistent with pneumonia. Very subtle right upper lobe infiltrate is suggested. No pleural effusion or pulmonary edema is demonstrated. Advise short-term follow-up to document clearing. Remainder is unremarkable.  This report was finalized on 9/19/2024 8:51 PM by Dr. Keo Aragon M.D on Workstation: [id number]         MEDICATIONS GIVEN IN ER  Medications   Potassium Replacement - Follow Nurse / BPA Driven Protocol (has no administration in time range)   potassium chloride (K-DUR,KLOR-CON) ER tablet 40 mEq (40 mEq Oral Given 9/19/24 2116)   nitroglycerin (NITROSTAT) SL tablet 0.4 mg (has no administration in time range)   acetaminophen (TYLENOL) tablet 650 mg (has no administration in time range)   sennosides-docusate (PERICOLACE) 8.6-50 MG per tablet 2 tablet (has no administration in time range)     And   polyethylene glycol (MIRALAX) packet 17 g (has no administration in time range)     And   bisacodyl (DULCOLAX) EC tablet 5 mg (has no administration in time range)     And   bisacodyl (DULCOLAX) suppository 10 mg (has no administration in time range)   ondansetron ODT (ZOFRAN-ODT) disintegrating tablet 4 mg (has no administration in time range)     Or   ondansetron (ZOFRAN) injection 4 mg (has no administration in time range)   sodium chloride 0.9 % with KCl  20 mEq/L infusion (has no administration in time range)         ORDERS PLACED DURING THIS VISIT:  Orders Placed This Encounter   Procedures    Respiratory Panel PCR w/COVID-19(SARS-CoV-2) NIDA/LUCIAN/NIALL/PAD/COR/GENNY In-House, NP Swab in UTM/VTM, 2 HR TAT - Swab, Nasopharynx    Legionella Antigen, Urine - Urine, Urine, Clean Catch    MRSA Screen, PCR (Inpatient) - Swab, Nares    S. Pneumo Ag Urine or CSF - Urine, Urine, Clean Catch    Blood Culture - Blood,    Blood Culture - Blood,    XR Chest 1 View    Comprehensive Metabolic Panel    Magnesium    CBC Auto Differential    Single High Sensitivity Troponin T    BNP    Potassium    Basic Metabolic Panel    CBC (No Diff)    Urinalysis With Microscopic If Indicated (No Culture) - Urine, Clean Catch    Procalcitonin    Protime-INR    Chloride, Urine, Random -    Diet: Regular/House; Fluid Consistency: Thin (IDDSI 0)    Vital Signs    Maintain IV Access    Telemetry - Place Orders & Notify Provider of Results When Patient Experiences Acute Chest Pain, Dysrhythmia or Respiratory Distress    May Be Off Telemetry for Tests    Up with assistance    Daily Weights    Strict Intake & Output    Oral Care    Place Sequential Compression Device    Maintain Sequential Compression Device    Continuous Pulse Oximetry    Code Status and Medical Interventions: CPR (Attempt to Resuscitate); Full    [facility] (on-call MD unless specified) Details    Oxygen Therapy- Nasal Cannula; Titrate 1-6 LPM Per SpO2; 90% or Greater    ECG 12 Lead Dyspnea    Inpatient Admission    CBC & Differential         OUTPATIENT MEDICATION MANAGEMENT:  Current Facility-Administered Medications Ordered in Epic   Medication Dose Route Frequency Provider Last Rate Last Admin    acetaminophen (TYLENOL) tablet 650 mg  650 mg Oral Q4H PRN Lashell Ovalles APRN        sennosides-docusate (PERICOLACE) 8.6-50 MG per tablet 2 tablet  2 tablet Oral BID PRN Lashell Ovalles APRN        And    polyethylene glycol (MIRALAX)  packet 17 g  17 g Oral Daily PRN Lashell Ovalles APRN        And    bisacodyl (DULCOLAX) EC tablet 5 mg  5 mg Oral Daily PRN Lashell Ovalles APRGORDON        And    bisacodyl (DULCOLAX) suppository 10 mg  10 mg Rectal Daily PRN Lashell Ovalles, APRN        nitroglycerin (NITROSTAT) SL tablet 0.4 mg  0.4 mg Sublingual Q5 Min PRN Lashell Ovalles APRN        ondansetron ODT (ZOFRAN-ODT) disintegrating tablet 4 mg  4 mg Oral Q6H PRN Lashell Ovalles APRN        Or    ondansetron (ZOFRAN) injection 4 mg  4 mg Intravenous Q6H PRN Lashell Ovalles, APRN        potassium chloride (K-DUR,KLOR-CON) ER tablet 40 mEq  40 mEq Oral Q4H Kelly Reardon MD   40 mEq at 09/19/24 2116    Potassium Replacement - Follow Nurse / BPA Driven Protocol   Does not apply PRN Jose Bangura MD        sodium chloride 0.9 % with KCl 20 mEq/L infusion  100 mL/hr Intravenous Continuous Lashell Ovalles APRN         Current Outpatient Medications Ordered in Epic   Medication Sig Dispense Refill    ibuprofen (ADVIL,MOTRIN) 600 MG tablet Take 1 tablet by mouth Every 6 (Six) Hours As Needed for Mild Pain . 30 tablet 0    oxyCODONE-acetaminophen (PERCOCET) 5-325 MG per tablet Take 1-2 tablets by mouth Every 4 (Four) Hours As Needed (Pain). 5 tablet 0    tobramycin-dexamethasone (TOBRADEX) 0.3-0.1 % ophthalmic suspension 3 drops 2 (Two) Times a Day. USES IN EARS           PROCEDURES  Procedures            PROGRESS, DATA ANALYSIS, CONSULTS, AND MEDICAL DECISION MAKING  All labs have been independently interpreted by me.  All radiology studies have been reviewed by me. All EKG's have been independently viewed and interpreted by me.  Discussion below represents my analysis of pertinent findings related to patient's condition, differential diagnosis, treatment plan and final disposition.    Differential diagnosis includes but is not limited to electrolyte abnormality, pneumonia, spurious lab result.    Clinical Scores:                    ED Course as of 09/19/24 2128   Thu Sep 19, 2024   2048 Potassium(!!): 2.3 [MW]   2051 EKG interpreted by me demonstrates sinus rhythm, rate of 73, no TX prolongation, no elevation, borderline QT prolongation [MW]   2052 Chest x-ray interpreted by me and demonstrates no evidence of consolidation [MW]   2058 Patient noted to be severely hypokalemic with a potassium of 2.3.  Will admit for replacement. [MW]   2108 Discussed with [facility] who agrees to admit [MW]      ED Course User Index  [MW] Jose Bangura MD             AS OF 21:28 EDT VITALS:    BP - (!) 197/104  HR - 107  TEMP - 98 °F (36.7 °C) (Tympanic)  O2 SATS - 92%    COMPLEXITY OF CARE  The patient requires admission.      DIAGNOSIS  Final diagnoses:   Hypokalemia         DISPOSITION  ED Disposition       ED Disposition   Decision to Admit    Condition   --    Comment   Level of Care: Telemetry [5]   Diagnosis: Hypokalemia [951646]   Admitting Physician: [facility] [[id number]]   Attending Physician: [facility] [[id number]]   Certification: I Certify That Inpatient Hospital Services Are Medically Necessary For Greater Than 2 Midnights                  Please note that portions of this document were completed with a voice recognition program.    Note Disclaimer: At [facility], we believe that sharing information builds trust and better relationships. You are receiving this note because you recently visited [facility]. It is possible you will see health information before a provider has talked with you about it. This kind of information can be easy to misunderstand. To help you fully understand what it means for your health, we urge you to discuss this note with your provider.         Jose Bangura MD  09/19/24 2128      Electronically signed by Jose Bangura MD at 09/19/24 2128       Oxygen Therapy (since admission)       Date/Time SpO2 Device (Oxygen Therapy) Flow (L/min) Oxygen Concentration (%) ETCO2 (mmHg)     09/20/24 1350 93 room air -- -- --    09/20/24 0912 -- room air -- -- --    09/20/24 0740 95 room air -- -- --    09/20/24 0638 95 -- -- -- --    09/20/24 0501 -- room air -- -- --    09/19/24 2258 92 -- -- -- --    09/19/24 2100 93 -- -- -- --    09/19/24 1944 92 room air -- -- --          Intake & Output (last 2 days)         09/18 0701 09/19 0700 09/19 0701 09/20 0700 09/20 0701 09/21 0700    P.O.  120 120    IV Piggyback  350     Total Intake(mL/kg)  470 (6.7) 120 (1.7)    Net  +470 +120           Urine Unmeasured Occurrence  1 x           Lines, Drains & Airways       Active LDAs       Name Placement date Placement time Site Days    Peripheral IV 09/19/24 2052 Anterior;Proximal;Right Forearm 09/19/24 2052  Forearm  less than 1              Inactive LDAs       None             Medication Administration Report for Kranthi Farley as of 9/19/24 through 9/20/24     Legend:    Given Hold Not Given Due Canceled Entry Other Actions    Time Time (Time) Time Time-Action         Discontinued     Completed     Future     MAR Hold     Linked             Medications 09/19/24 09/20/24     acetaminophen (TYLENOL) tablet 650 mg  Dose: 650 mg  Freq: Every 4 Hours PRN Route: PO  PRN Reason: Mild Pain  Start: 09/19/24 2112     Admin Instructions:   If given for fever, use fever parameter: fever greater than 100.4 °F  Based on patient request - if ordered for moderate or severe pain, provider allows for administration of a medication prescribed for a lower pain scale.    Do not exceed 4 grams of acetaminophen in a 24 hr period. Max dose of 2gm for AST/ALT greater than 120 units/L.    If given for pain, use the following pain scale:   Mild Pain = Pain Score of 1-3, CPOT 1-2  Moderate Pain = Pain Score of 4-6, CPOT 3-4  Severe Pain = Pain Score of 7-10, CPOT 5-8           sennosides-docusate (PERICOLACE) 8.6-50 MG per tablet 2 tablet  Dose: 2 tablet  Freq: 2 Times Daily PRN Route: PO  PRN Reason: Constipation  Start:  "09/19/24 2112     Admin Instructions:   Start bowel management regimen if patient has not had a bowel movement after 12 hours.          And   polyethylene glycol (MIRALAX) packet 17 g  Dose: 17 g  Freq: Daily PRN Route: PO  PRN Reason: Constipation  PRN Comment: Use if senna-docusate is ineffective  Start: 09/19/24 2112     Admin Instructions:   Use if no bowel movement after 12 hours. Mix in 6-8 ounces of water.  Use 4-8 ounces of water, tea, or juice for each 17 gram dose.          And   bisacodyl (DULCOLAX) EC tablet 5 mg  Dose: 5 mg  Freq: Daily PRN Route: PO  PRN Reason: Constipation  PRN Comment: Use if polyethylene glycol is ineffective  Start: 09/19/24 2112     Admin Instructions:   Use if no bowel movement after 12 hours.  Swallow whole. Do not crush, split, or chew tablet.          And   bisacodyl (DULCOLAX) suppository 10 mg  Dose: 10 mg  Freq: Daily PRN Route: RE  PRN Reason: Constipation  PRN Comment: Use if bisacodyl oral is ineffective  Start: 09/19/24 2112     Admin Instructions:   Use if no bowel movement after 12 hours.  Hold for diarrhea          Calcium Replacement - Follow Nurse / BPA Driven Protocol  Freq: As Needed Route: XX  PRN Reason: Other  Start: 09/20/24 1352     Admin Instructions:   Open Order & Select \"[facility] Electrolyte Replacement Protocol Algorithm\" to View Details          dextrose (D50W) (25 g/50 mL) IV injection 25 g  Dose: 25 g  Freq: Every 15 Minutes PRN Route: IV  PRN Reason: Low Blood Sugar  PRN Comment: Blood Sugar Less Than 70  Start: 09/19/24 2355     Admin Instructions:   Blood sugar less than 70; patient has IV access - Unresponsive, NPO or Unable To Safely Swallow          dextrose (GLUTOSE) oral gel 15 g  Dose: 15 g  Freq: Every 15 Minutes PRN Route: PO  PRN Reason: Low Blood Sugar  PRN Comment: Blood sugar less than 70  Start: 09/19/24 2355     Admin Instructions:   BS<70, Patient Alert, Is not NPO, Can safely swallow.          glucagon (GLUCAGEN) injection 1 mg  Dose: " "1 mg  Freq: Every 15 Minutes PRN Route: IM  PRN Reason: Low Blood Sugar  PRN Comment: Blood Glucose Less Than 70  Start: 09/19/24 2355     Admin Instructions:   Blood Glucose Less Than 70 - Patient Without IV Access - Unresponsive, NPO or Unable To Safely Swallow  Reconstitute powder for injection by adding 1 mL of -supplied sterile diluent or sterile water for injection to a vial containing 1 mg of the drug, to provide solutions containing 1 mg/mL. Shake vial gently to dissolve.          ipratropium-albuterol (DUO-NEB) nebulizer solution 3 mL  Dose: 3 mL  Freq: Every 6 Hours PRN Route: NEBULIZATION  PRN Reasons: Shortness of Air,Wheezing  Start: 09/19/24 2354     Admin Instructions:   Include Respiratory Treatment Education          Magnesium Standard Dose Replacement - Follow Nurse / BPA Driven Protocol  Freq: As Needed Route: XX  PRN Reason: Other  Start: 09/20/24 1352     Admin Instructions:   Open Order & Select \"[facility] Electrolyte Replacement Protocol Algorithm\" to View Details          nitroglycerin (NITROSTAT) SL tablet 0.4 mg  Dose: 0.4 mg  Freq: Every 5 Minutes PRN Route: SL  PRN Reason: Chest Pain  PRN Comment: Only if SBP Greater Than 100  Start: 09/19/24 2112     Admin Instructions:   If Pain Unrelieved After 3 Doses Notify MD  May administer up to 3 doses per episode.           ondansetron ODT (ZOFRAN-ODT) disintegrating tablet 4 mg  Dose: 4 mg  Freq: Every 6 Hours PRN Route: PO  PRN Reasons: Nausea,Vomiting  Start: 09/19/24 2112     Admin Instructions:   If BOTH ondansetron (ZOFRAN) and promethazine (PHENERGAN) are ordered use ondansetron first and THEN promethazine IF ondansetron is ineffective.  Place on tongue and allow to dissolve.          Or   ondansetron (ZOFRAN) injection 4 mg  Dose: 4 mg  Freq: Every 6 Hours PRN Route: IV  PRN Reasons: Nausea,Vomiting  Start: 09/19/24 2112          Pharmacy Consult  Freq: Continuous PRN Route: XX  PRN Reason: Consult  Start: 09/20/24 0114     Order " "specific questions:   Consult for Determine which inhalers are on her insurance for COPD management          Phosphorus Replacement - Follow Nurse / BPA Driven Protocol  Freq: As Needed Route: XX  PRN Reason: Other  Start: 24     Admin Instructions:   Open Order & Select \"[facility] Electrolyte Replacement Protocol Algorithm\" to View Details          Potassium Replacement - Follow Nurse / BPA Driven Protocol  Freq: As Needed Route: XX  PRN Reason: Other  Start: 24     Admin Instructions:   Open Order & Select \"[facility] Electrolyte Replacement Protocol Algorithm\" to View Details          Potassium Replacement - Follow Nurse / BPA Driven Protocol  Freq: As Needed Route: XX  PRN Reason: Other  Start: 24     Admin Instructions:   Open Order & Select \"[facility] Electrolyte Replacement Protocol Algorithm\" to View Details          Discontinued Medications  Medications 24      cefTRIAXone (ROCEPHIN) 2 g injection  - ADS Override Pull  Start: 24 End: 24     Admin Instructions:   Created by cabinet override  Caution: Look alike/sound alike drug alert       (0553)-Not Given [C]                       Physician Progress Notes (all)        Royer Prajapati, DO at 24 1010              Name: Kranthi Farley ADMIT: 2024   : 1960  PCP: Alexandra Keller PA-C    MRN: [id number] LOS: 1 days   AGE/SEX: 64 y.o. female  ROOM: [facility] [place]     Subjective   Subjective   Patient awake and resting in bed, feeling better from arrival, still with fatigue.      Objective   Objective   Vital Signs  Temp:  [97.9 °F (36.6 °C)-98 °F (36.7 °C)] 97.9 °F (36.6 °C)  Heart Rate:  [] 75  Resp:  [18] 18  BP: (162-198)/() 162/93  SpO2:  [92 %-95 %] 95 %  on   ;   Device (Oxygen Therapy): room air  Body mass index is 20.35 kg/m².  Physical Exam  Vitals and nursing note reviewed.   Constitutional:       General: She is not in acute distress.     Appearance: She is ill-appearing. "   Cardiovascular:      Rate and Rhythm: Normal rate and regular rhythm.      Pulses: Normal pulses.      Heart sounds: Normal heart sounds.   Pulmonary:      Effort: Pulmonary effort is normal. No respiratory distress.      Breath sounds: Decreased breath sounds present.   Abdominal:      General: Bowel sounds are normal.      Palpations: Abdomen is soft.      Tenderness: There is no abdominal tenderness.   Musculoskeletal:      Right lower leg: No edema.      Left lower leg: No edema.   Skin:     General: Skin is warm and dry.   Neurological:      Mental Status: She is alert.       Results Review     I reviewed the patient's new clinical results.  Results from last 7 days   Lab Units 09/20/24  1003 09/19/24  2006 09/19/24  0925   WBC 10*3/mm3 13.70* 17.19* 19.98*   HEMOGLOBIN g/dL 12.3 14.2 14.6   PLATELETS 10*3/mm3 104* 109* 129*     Results from last 7 days   Lab Units 09/20/24  1003 09/19/24 2006   SODIUM mmol/L 142 142   POTASSIUM mmol/L 3.0* 2.3*   CHLORIDE mmol/L 96* 92*   CO2 mmol/L 37.4* 39.1*   BUN mg/dL 18 19   CREATININE mg/dL 0.65 0.67   GLUCOSE mg/dL 166* 190*   EGFR mL/min/1.73 98.5 97.7     Results from last 7 days   Lab Units 09/19/24 2006   ALBUMIN g/dL 3.9   BILIRUBIN mg/dL 1.3*   ALK PHOS U/L 143*   AST (SGOT) U/L 31   ALT (SGPT) U/L 46*     Results from last 7 days   Lab Units 09/20/24  1003 09/19/24 2006   CALCIUM mg/dL 9.0 9.6   ALBUMIN g/dL  --  3.9   MAGNESIUM mg/dL  --  2.0     Results from last 7 days   Lab Units 09/20/24  0001 09/19/24 2006   PROCALCITONIN ng/mL  --  1.75*   LACTATE mmol/L 1.2  --      Hemoglobin A1C   Date/Time Value Ref Range Status   09/19/2024 0925 7.5 (H) 4.3 - 5.6 % Final     Glucose   Date/Time Value Ref Range Status   09/20/2024 1120 80 70 - 130 mg/dL Final   09/20/2024 0529 165 (H) 70 - 130 mg/dL Final       CT Chest With Contrast Diagnostic    Result Date: 9/20/2024  1. 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 Recommend tissue sampling and oncologic consultation. 2. Left upper lobe perihilar mass/left hilar lymphadenopathy causes moderate narrowing of the mid left main pulmonary artery and left upper lobe segmental pulmonary arteries which remain patent where seen. Lymphadenopathy also causes severe narrowing/stenosis of the left upper lobe proximal segmental bronchi. Left upper lobe remains aerated.   This report was finalized on 9/20/2024 12:37 PM by Dr. Jose Wood M.D on Workstation: [id number]      CT Abdomen Pelvis With Contrast    Result Date: 9/20/2024  1. 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 Recommend tissue sampling and oncologic consultation. 2. Left upper lobe perihilar mass/left hilar lymphadenopathy causes moderate narrowing of the mid left main pulmonary artery and left upper lobe segmental pulmonary arteries which remain patent where seen. Lymphadenopathy also causes severe narrowing/stenosis of the left upper lobe proximal segmental bronchi. Left upper lobe remains aerated.   This report was finalized on 9/20/2024 12:37 PM by Dr. Jose Wood M.D on Workstation: [id number]       I have personally reviewed all medications:  Scheduled Medications  azithromycin, 500 mg, Intravenous, Q24H  budesonide-formoterol, 2 puff, Inhalation, BID - RT  cefTRIAXone, 2,000 mg, Intravenous, Q24H  enoxaparin, 40 mg, Subcutaneous, Q24H  insulin lispro, 2-7 Units, Subcutaneous, 4x Daily AC & at Bedtime  potassium chloride ER, 40 mEq, Oral, Q4H  tiotropium bromide monohydrate, 2 puff, Inhalation, Daily - RT    Infusions  Pharmacy Consult,   sodium chloride 0.9 % with KCl 20 mEq, 100 mL/hr, Last Rate: 100 mL/hr (09/20/24 0916)    Diet  Diet: Cardiac, Diabetic; Healthy Heart (2-3 Na+); Consistent  Carbohydrate; Fluid Consistency: Thin (IDDSI 0)    I have personally reviewed:  [x]  Laboratory   [x]  Microbiology   [x]  Radiology   [x]  EKG/Telemetry  []  Cardiology/Vascular   []  Pathology    []  Records      Assessment/Plan     Active Hospital Problems    Diagnosis  POA    **Hypokalemia [E87.6]  Yes    Pulmonary nodules [R91.8]  Yes    Abnormal CT of the chest [R93.89]  Yes    Elevated blood pressure reading without diagnosis of hypertension [R03.0]  Yes    Leukocytosis [D72.829]  Yes    Thrombocytopenia [D69.6]  Yes    CAP (community acquired pneumonia) [J18.9]  Yes    Type 2 diabetes mellitus with hyperglycemia, without long-term current use of insulin [E11.65]  Yes      Resolved Hospital Problems   No resolved problems to display.       64 y.o. female admitted with Hypokalemia.    Pulmonary nodules  Perihilar mass  Lymphadenopathy  Abnormal weight loss, tobacco use  - Patient with weight loss over past several months, coupled with ongoing tobacco use. CT Chest obtained, results have returned today (09/20), impression suggesting primary left upper lobe malignancy with doreen (left hilar, mediastinal and left cervical), pulmonary, hepatic and osseous metastatic disease. Oncology consultation recommended, will place consult now.  - Also noting multiple pulmonary nodules, and as above mass/adenopathy, tissue sampling recommended, will consult Pulmonology to assess and see if endoscopic intervention can be pursued.    Pneumonia  Leukocytosis  - Findings of pneumonia noted on imaging, coupled with leukocytosis, elevated procalcitonin. UA is also abnormal, but patient without urinary complaints.  RVP/MRSA/strep/legionella negative, blood cultures pending.  - Continue empiric antibiotic therapy for CAP. Follow up culture results, trend WBC count.    Elevated blood pressure without prior diagnosis of hypertension, newly diagnosed hypertension  Hypokalemia  Metabolic alkalosis  - Elevated BP noted, patient not on  antihypertensive medications, multiple elevations noted, will classify as newly diagnosed hypertension. Potassium low, metabolic alkalosis noted, concern for possible hyperaldosteronism.  - Aldosterone/renin pending, will follow up these results. Discussed with Dr. Castle, given that she has newly diagnosed hypertension, coupled with hypokalemia and metabolic alkalosis, strong suspicion for hyperaldosteronism. Likely going to go ahead and start Aldactone now. Dr. Castle is going to come see patient, greatly appreciate his help.  - Check magnesium level. Consider urine studies.    Type 2 diabetes mellitus with hyperglycemia  - Most recent A1c: 7.50%  - Current treatment regimen: SSI; Glucose acceptable, continue SSI as prescribed.  - Will monitor 24 hour insulin requirements and adjust as needed to achieve target inpatient range of 140-180.  - Diabetic diet    Thrombocytopenia  - Platelets found to be low on most recent labs. No indications for urgent intervention at present. Will monitor for now.  - Order repeat CBC in AM for reassessment.    COPD  - No evidence to suggest acute exacerbation.  Continue current medications as prescribed and closely monitor.  - Supplemental oxygen as needed to maintain O2 sats 88 to 92%, pulse oximetry, telemetry monitoring.    Transaminitis  - Noted on labs, abdominal exam benign, CT showing abnormal findings involving liver, monitor for now with CMP.    SCDs for DVT prophylaxis.  Full code.  Discussed with patient and nursing staff.  Anticipate discharge home timing yet to be determined.  Expected Discharge Date: 9/23/2024; Expected Discharge Time:       Royer Prajapati DO  [facility]  09/20/24        Electronically signed by Royer Prajapati DO at 09/20/24 1413          Consult Notes (all)        Armand Castle MD at 09/20/24 1454        Consult Orders    1. Inpatient Nephrology Consult [[id number]] ordered by Royer Prajapati DO at 09/20/24 1415                                                                                                                  [facility]                                                                                             Nephrology Initial Consult Note    Patient Identification:  Name: Kranthi Farley MRN: [id number]  Age: 64 y.o. : 1960  Sex: female  Date:2024    Requesting Physician: As per consult order.  Reason for Consultation: Hypokalemia, metabolic acidosis with hypertension  Information from:patient/ family/ chart      History of Present Illness: This is a 64 y.o. year old female with a history of COPD, emphysema and tobacco abuse.  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  She was initially mated with fatigue and malaise, 40 pound weight loss over the last 3 months.  Admission labs shows significant hypokalemia, hypertension on initial vitals with BP as high as 198/89.  Her CO2 was 39 initially.  Potassium remains low despite replacement.  CT shows upper lobe infiltrate consistent with malignancy and evidence of metastatic disease on imaging.  Appetite low but no current nausea or vomiting.  She is not on any diuretics or laxatives at home    The following medical history and medications personally reviewed by me:    Problem List:     Hypokalemia    Leukocytosis    Thrombocytopenia    CAP (community acquired pneumonia)    Type 2 diabetes mellitus with hyperglycemia, without long-term current use of insulin    Pulmonary nodules    Abnormal CT of the chest    Elevated blood pressure reading without diagnosis of hypertension      Past Medical History:  Past Medical History:   Diagnosis Date    Cancer     SKIN REMOVED    Cholesteatoma     BILATERAL EARS    PONV (postoperative nausea and vomiting)        Past Surgical History:  Past Surgical History:   Procedure Laterality Date    BLADDER REPAIR       SECTION      INNER EAR SURGERY Bilateral     CHOLESTEOMA    VULVA BIOPSY Right  11/12/2021    Procedure: WIDE LOCAL EXCISION OF VULVA LESION;  Surgeon: Dwayne Sood MD;  [place] [facility] [place] [facility] [place]  Service: [place]necology;  Laterality: Right;        Home Meds:   No medications prior to admission.       Current Meds:   Current Facility-Administered Medications   Medication Dose Route Frequency Provider Last Rate Last Admin    acetaminophen (TYLENOL) tablet 650 mg  650 mg Oral Q4H PRN Lashell Ovalles APRN        azithromycin (ZITHROMAX) 500 mg in sodium chloride 0.9 % 250 mL IVPB-VTB  500 mg Intravenous Q24H Lashell Ovalles APRN   Stopped at 09/20/24 0414    sennosides-docusate (PERICOLACE) 8.6-50 MG per tablet 2 tablet  2 tablet Oral BID PRN Lashell Ovalles APRN        And    polyethylene glycol (MIRALAX) packet 17 g  17 g Oral Daily PRN Lashell Ovalles APRN        And    bisacodyl (DULCOLAX) EC tablet 5 mg  5 mg Oral Daily PRN Lashell Ovalles APRN        And    bisacodyl (DULCOLAX) suppository 10 mg  10 mg Rectal Daily PRN Lashell Ovalles APRN        budesonide-formoterol (SYMBICORT) 160-4.5 MCG/ACT inhaler 2 puff  2 puff Inhalation BID - RT Kelly Reardon MD        Calcium Replacement - Follow Nurse / BPA Driven Protocol   Does not apply PRN Royer Prajapati,         cefTRIAXone (ROCEPHIN) 2,000 mg in sodium chloride 0.9 % 100 mL MBP  2,000 mg Intravenous Q24H Lashell Ovalles APRN   Stopped at 09/20/24 0045    dextrose (D50W) (25 g/50 mL) IV injection 25 g  25 g Intravenous Q15 Min PRN Lashell Ovalles APRN        dextrose (GLUTOSE) oral gel 15 g  15 g Oral Q15 Min PRN Lashell Ovalles APRN        Enoxaparin Sodium (LOVENOX) syringe 40 mg  40 mg Subcutaneous Q24H Kelly Reardon MD   40 mg at 09/20/24 0917    glucagon (GLUCAGEN) injection 1 mg  1 mg Intramuscular Q15 Min PRN Lashell Ovalles APRN        insulin lispro (HUMALOG/ADMELOG) injection 2-7 Units  2-7 Units Subcutaneous 4x Daily AC & at Bedtime Lashell Ovalles APRN   2 Units at 09/20/24  0917    ipratropium-albuterol (DUO-NEB) nebulizer solution 3 mL  3 mL Nebulization Q6H PRN Lashell Ovalles APRN        Magnesium Standard Dose Replacement - Follow Nurse / BPA Driven Protocol   Does not apply PRN Royer Prajapati,         nitroglycerin (NITROSTAT) SL tablet 0.4 mg  0.4 mg Sublingual Q5 Min PRN Lashell Ovalles APRGORDON        ondansetron ODT (ZOFRAN-ODT) disintegrating tablet 4 mg  4 mg Oral Q6H PRN Lashell Ovalles APRGORDON        Or    ondansetron (ZOFRAN) injection 4 mg  4 mg Intravenous Q6H PRN Lashell Ovalles APRN        Pharmacy Consult   Does not apply Continuous PRN Kelly Reardon MD        Phosphorus Replacement - Follow Nurse / BPA Driven Protocol   Does not apply PRN Royer Prajapati,         potassium chloride (K-DUR,KLOR-CON) ER tablet 40 mEq  40 mEq Oral Q4H Royer Prajapati DO   40 mEq at 24 1124    Potassium Replacement - Follow Nurse / BPA Driven Protocol   Does not apply PRN Jose Bangura MD        Potassium Replacement - Follow Nurse / BPA Driven Protocol   Does not apply PRN Royer Prajapati, DO        sodium chloride 0.9 % with KCl 20 mEq/L infusion  100 mL/hr Intravenous Continuous Lashell Ovalles APRN 100 mL/hr at 24 0916 100 mL/hr at 24 0916    tiotropium (SPIRIVA RESPIMAT) 2.5 mcg/act aerosol solution inhaler  2 puff Inhalation Daily - RT Kelly Reardon MD           Allergies:  No Known Allergies    Social History:   Social History     Socioeconomic History    Marital status:    Tobacco Use    Smoking status: Former     Types: Cigarettes     Start date:      Quit date: 1974     Years since quittin.7    Smokeless tobacco: Never    Tobacco comments:     INST. PER ANESTHESIA PROTOCOL   Vaping Use    Vaping status: Never Used   Substance and Sexual Activity    Alcohol use: Never    Drug use: Never    Sexual activity: Defer        Family History:  Family History   Problem Relation Age of Onset    Malig Hyperthermia Neg Hx      "    Review of Systems: as per HPI, in addition:    General:      + Weakness / fatigue,                       No fevers / chills                       no weight loss  HEENT;       no dysphagia or visual changes  Neck:           normal range of motion, no swelling  Respiratory: no cough / congestion                      No shortness of air                       No wheezing  CV:              No chest pain                       No palpitations  Abdomen/GI: no nausea / vomiting                      No diarrhea / constipation                      No abdominal pain  :             no dysuria / urinary frequency                       No urgency, normal output  Endocrine:   no polyuria / polydipsia,                      No heat or cold intolerance  Skin:           Denies rashes or skin lesions   Vascular:   No edema  Musculoskeletal: + Joint pain or deformities      Physical Exam:  Vitals:   Temp (24hrs), Av °F (36.7 °C), Min:97.9 °F (36.6 °C), Max:98.2 °F (36.8 °C)    /73 (BP Location: Left arm, Patient Position: Lying)   Pulse 76   Temp 98.2 °F (36.8 °C) (Oral)   Resp 18   Ht 185.4 cm (72.99\")   Wt 69.9 kg (154 lb 3.2 oz)   SpO2 93%   BMI 20.35 kg/m²   Intake/Output:     Intake/Output Summary (Last 24 hours) at 2024 1454  Last data filed at 2024 1350  Gross per 24 hour   Intake 590 ml   Output --   Net 590 ml        Wt Readings from Last 1 Encounters:   24 0445 69.9 kg (154 lb 3.2 oz)   24 1944 69.9 kg (154 lb)       Exam:    General Appearance:  Awake, alert, no acute distress  Chronically ill-appearing   Head and Face:  Normocephalic, atraumatic, mucous membranes moist, oropharynx clear   Eyes:  No icterus, pupils equal round and reactive to light, extraocular movements intact    ENMT: Moist mucosa, tongue symmetric    Neck: Supple  no jugular venous distention  no thyromegaly   Pulmonary:  Respiratory effort: Normal  Auscultation of lungs: Clear bilaterally  No wheezes  No " rhonchi  Good air movement, good expansion   Chest wall:  No tenderness or deformity   Cardiovascular:  Auscultation of the heart: Normal rhythm, no murmurs  No edema of bilateral lower extremities   Abdomen:  Abdomen: soft, nontender, normal bowel sounds all four quadrants, no masses   Liver and spleen: no hepatosplenomegaly   Musculoskeletal: Digits and nails: normal  Normal range of motion  No joint swelling or gross deformities    Skin: Skin inspection: color normal, no visible rashes or lesions  Skin palpation: texture, turgor normal, no palpable lesions   Lymphatic:  no cervical lymphadenopathy    Psychiatric: Judgement and insight: normal  Orientation to person place and time: normal  Mood and affect: normal       DATA:  Radiology and Labs:  The following labs independently reviewed by me, additional AM labs ordered  Old records independently reviewed showing previously normal potassium levels, normal baseline creatinine  The following radiologic studies independently viewed by me, findings CT chest abdomen pelvis show primary left upper lobe malignancy with doreen osseous pulmonary and hepatic metastatic disease.  Interval notes, chart personally reviewed by me.  I have reviewed and summarized old records as detailed above  Plan of care discussed with Dr. Prajapati, patient  New problems include hypokalemia, uncontrolled hypertension, metabolic alkalosis      Risk/ complexity of medical care/ medical decision making: High complexity, multiple electrolyte abnormalities  Chronic illness with severe exacerbation or progression: CKD, hypertension and hypokalemia      Labs:   Recent Results (from the past 24 hour(s))   Comprehensive Metabolic Panel    Collection Time: 09/19/24  8:06 PM    Specimen: Arm, Left; Blood   Result Value Ref Range    Glucose 190 (H) 65 - 99 mg/dL    BUN 19 8 - 23 mg/dL    Creatinine 0.67 0.57 - 1.00 mg/dL    Sodium 142 136 - 145 mmol/L    Potassium 2.3 (C) 3.5 - 5.2 mmol/L    Chloride 92 (L)  98 - 107 mmol/L    CO2 39.1 (H) 22.0 - 29.0 mmol/L    Calcium 9.6 8.6 - 10.5 mg/dL    Total Protein 6.7 6.0 - 8.5 g/dL    Albumin 3.9 3.5 - 5.2 g/dL    ALT (SGPT) 46 (H) 1 - 33 U/L    AST (SGOT) 31 1 - 32 U/L    Alkaline Phosphatase 143 (H) 39 - 117 U/L    Total Bilirubin 1.3 (H) 0.0 - 1.2 mg/dL    Globulin 2.8 gm/dL    A/G Ratio 1.4 g/dL    BUN/Creatinine Ratio 28.4 (H) 7.0 - 25.0    Anion Gap 10.9 5.0 - 15.0 mmol/L    eGFR 97.7 >60.0 mL/min/1.73   Magnesium    Collection Time: 09/19/24  8:06 PM    Specimen: Arm, Left; Blood   Result Value Ref Range    Magnesium 2.0 1.6 - 2.4 mg/dL   CBC Auto Differential    Collection Time: 09/19/24  8:06 PM    Specimen: Arm, Left; Blood   Result Value Ref Range    WBC 17.19 (H) 3.40 - 10.80 10*3/mm3    RBC 4.99 3.77 - 5.28 10*6/mm3    Hemoglobin 14.2 12.0 - 15.9 g/dL    Hematocrit 42.0 34.0 - 46.6 %    MCV 84.2 79.0 - 97.0 fL    MCH 28.5 26.6 - 33.0 pg    MCHC 33.8 31.5 - 35.7 g/dL    RDW 14.2 12.3 - 15.4 %    RDW-SD 42.6 37.0 - 54.0 fl    MPV 8.4 6.0 - 12.0 fL    Platelets 109 (L) 140 - 450 10*3/mm3    Neutrophil % 86.1 (H) 42.7 - 76.0 %    Lymphocyte % 6.3 (L) 19.6 - 45.3 %    Monocyte % 4.7 (L) 5.0 - 12.0 %    Eosinophil % 0.0 (L) 0.3 - 6.2 %    Basophil % 0.2 0.0 - 1.5 %    Immature Grans % 2.7 (H) 0.0 - 0.5 %    Neutrophils, Absolute 14.81 (H) 1.70 - 7.00 10*3/mm3    Lymphocytes, Absolute 1.08 0.70 - 3.10 10*3/mm3    Monocytes, Absolute 0.81 0.10 - 0.90 10*3/mm3    Eosinophils, Absolute 0.00 0.00 - 0.40 10*3/mm3    Basophils, Absolute 0.03 0.00 - 0.20 10*3/mm3    Immature Grans, Absolute 0.46 (H) 0.00 - 0.05 10*3/mm3    nRBC 0.0 0.0 - 0.2 /100 WBC   Single High Sensitivity Troponin T    Collection Time: 09/19/24  8:06 PM    Specimen: Arm, Left; Blood   Result Value Ref Range    HS Troponin T 16 (H) <14 ng/L   BNP    Collection Time: 09/19/24  8:06 PM    Specimen: Arm, Left; Blood   Result Value Ref Range    proBNP 709.0 0.0 - 900.0 pg/mL   Procalcitonin    Collection Time:  09/19/24  8:06 PM    Specimen: Arm, Left; Blood   Result Value Ref Range    Procalcitonin 1.75 (H) 0.00 - 0.25 ng/mL   ECG 12 Lead Dyspnea    Collection Time: 09/19/24  8:45 PM   Result Value Ref Range    QT Interval 484 ms    QTC Interval 534 ms   Urinalysis With Microscopic If Indicated (No Culture) - Urine, Clean Catch    Collection Time: 09/19/24 10:57 PM    Specimen: Urine, Clean Catch   Result Value Ref Range    Color, UA Yellow Yellow, Straw    Appearance, UA Clear Clear    pH, UA 6.5 5.0 - 8.0    Specific Gravity, UA 1.026 1.005 - 1.030    Glucose, UA Negative Negative    Ketones, UA Negative Negative    Bilirubin, UA Negative Negative    Blood, UA Negative Negative    Protein,  mg/dL (2+) (A) Negative    Leuk Esterase, UA Small (1+) (A) Negative    Nitrite, UA Negative Negative    Urobilinogen, UA 1.0 E.U./dL 0.2 - 1.0 E.U./dL   Respiratory Panel PCR w/COVID-19(SARS-CoV-2) NIDA/LUCIAN/NIALL/PAD/COR/GENNY In-House, NP Swab in UTM/VTM, 2 HR TAT - Swab, Nasopharynx    Collection Time: 09/19/24 10:57 PM    Specimen: Nasopharynx; Swab   Result Value Ref Range    ADENOVIRUS, PCR Not Detected Not Detected    Coronavirus 229E Not Detected Not Detected    Coronavirus HKU1 Not Detected Not Detected    Coronavirus NL63 Not Detected Not Detected    Coronavirus OC43 Not Detected Not Detected    COVID19 Not Detected Not Detected - Ref. Range    Human Metapneumovirus Not Detected Not Detected    Human Rhinovirus/Enterovirus Not Detected Not Detected    Influenza A PCR Not Detected Not Detected    Influenza B PCR Not Detected Not Detected    Parainfluenza Virus 1 Not Detected Not Detected    Parainfluenza Virus 2 Not Detected Not Detected    Parainfluenza Virus 3 Not Detected Not Detected    Parainfluenza Virus 4 Not Detected Not Detected    RSV, PCR Not Detected Not Detected    Bordetella pertussis pcr Not Detected Not Detected    Bordetella parapertussis PCR Not Detected Not Detected    Chlamydophila pneumoniae PCR Not  Detected Not Detected    Mycoplasma pneumo by PCR Not Detected Not Detected   Legionella Antigen, Urine - Urine, Urine, Clean Catch    Collection Time: 09/19/24 10:57 PM    Specimen: Urine, Clean Catch   Result Value Ref Range    LEGIONELLA ANTIGEN, URINE Negative Negative   S. Pneumo Ag Urine or CSF - Urine, Urine, Clean Catch    Collection Time: 09/19/24 10:57 PM    Specimen: Urine, Clean Catch   Result Value Ref Range    Strep Pneumo Ag Negative Negative   Chloride, Urine, Random -    Collection Time: 09/19/24 10:57 PM    Specimen: Urine   Result Value Ref Range    Chloride, Urine 28 mmol/L   Urinalysis, Microscopic Only - Urine, Clean Catch    Collection Time: 09/19/24 10:57 PM    Specimen: Urine, Clean Catch   Result Value Ref Range    RBC, UA 0-2 None Seen, 0-2 /HPF    WBC, UA 3-5 (A) None Seen, 0-2 /HPF    Bacteria, UA Trace (A) None Seen /HPF    Squamous Epithelial Cells, UA 3-6 (A) None Seen, 0-2 /HPF    Renal Epithelial Cells, UA 3-6 (A) 0 - 2 /HPF    Hyaline Casts, UA 7-12 None Seen /LPF    Granular Casts, UA Present None Seen /LPF    Methodology Manual Light Microscopy    Protime-INR    Collection Time: 09/20/24 12:01 AM    Specimen: Arm, Right; Blood   Result Value Ref Range    Protime 14.6 (H) 11.7 - 14.2 Seconds    INR 1.12 (H) 0.90 - 1.10   Lactic Acid, Plasma    Collection Time: 09/20/24 12:01 AM    Specimen: Arm, Right; Blood   Result Value Ref Range    Lactate 1.2 0.5 - 2.0 mmol/L   MRSA Screen, PCR (Inpatient) - Swab, Nares    Collection Time: 09/20/24  4:54 AM    Specimen: Nares; Swab   Result Value Ref Range    MRSA PCR No MRSA Detected No MRSA Detected   POC Glucose Once    Collection Time: 09/20/24  5:29 AM    Specimen: Blood   Result Value Ref Range    Glucose 165 (H) 70 - 130 mg/dL   Basic Metabolic Panel    Collection Time: 09/20/24 10:03 AM    Specimen: Blood   Result Value Ref Range    Glucose 166 (H) 65 - 99 mg/dL    BUN 18 8 - 23 mg/dL    Creatinine 0.65 0.57 - 1.00 mg/dL    Sodium 142  136 - 145 mmol/L    Potassium 3.0 (L) 3.5 - 5.2 mmol/L    Chloride 96 (L) 98 - 107 mmol/L    CO2 37.4 (H) 22.0 - 29.0 mmol/L    Calcium 9.0 8.6 - 10.5 mg/dL    BUN/Creatinine Ratio 27.7 (H) 7.0 - 25.0    Anion Gap 8.6 5.0 - 15.0 mmol/L    eGFR 98.5 >60.0 mL/min/1.73   CBC (No Diff)    Collection Time: 09/20/24 10:03 AM    Specimen: Blood   Result Value Ref Range    WBC 13.70 (H) 3.40 - 10.80 10*3/mm3    RBC 4.36 3.77 - 5.28 10*6/mm3    Hemoglobin 12.3 12.0 - 15.9 g/dL    Hematocrit 36.8 34.0 - 46.6 %    MCV 84.4 79.0 - 97.0 fL    MCH 28.2 26.6 - 33.0 pg    MCHC 33.4 31.5 - 35.7 g/dL    RDW 13.9 12.3 - 15.4 %    RDW-SD 42.7 37.0 - 54.0 fl    MPV 9.1 6.0 - 12.0 fL    Platelets 104 (L) 140 - 450 10*3/mm3   Magnesium    Collection Time: 09/20/24 10:03 AM    Specimen: Blood   Result Value Ref Range    Magnesium 2.0 1.6 - 2.4 mg/dL   POC Glucose Once    Collection Time: 09/20/24 11:20 AM    Specimen: Blood   Result Value Ref Range    Glucose 80 70 - 130 mg/dL       Radiology:  Imaging Results (Last 24 Hours)       Procedure Component Value Units Date/Time    CT Chest With Contrast Diagnostic [[id number]] Collected: 09/20/24 1228     Updated: 09/20/24 1240    Narrative:      CT ABDOMEN PELVIS W CONTRAST-, CT CHEST W CONTRAST DIAGNOSTIC-     HISTORY: 40 pound unintentional weight loss     TECHNIQUE: Radiation dose reduction techniques were utilized, including  automated exposure control and exposure modulation based on body size. 3  mm images were obtained through the chest abdomen and pelvis after the  administration of IV contrast.  3D reformat images were created.  Multiple coronal, sagittal, and 3-D reconstruction images were obtained.        COMPARISON: None available        FINDINGS: Subtle sclerotic changes throughout the axial and proximal  appendicular skeleton with multiple more discrete sclerotic lesions most  conspicuous in the bilateral intertrochanteric femurs (series 5/image  80).     Heart/is normal in size.  Severe coronary artery calcifications. Trace  pericardial fluid. Nondilated main pulmonary artery and thoracic aorta.  Left hilar and mediastinal lymphadenopathy. Reference 4.3 x 2.2 cm  prevascular doreen conglomeration (series 2/image 30). Reference 2.2 cm  left hilar lymph node (series 2/image 47). Lymphadenopathy causes  moderate narrowing of the mid left main pulmonary artery and left upper  lobe segmental pulmonary arteries which remain patent where seen. Left  level IV lymphadenopathy. Reference 1 cm lymph node (series 2/image 9).  Nondilated esophagus.     Left hilar lymphadenopathy causes resulting severe narrowing/stenosis of  the left upper lobe proximal segmental bronchi. Left upper lobe remains  aerated. Contiguous with the left hilar lymphadenopathy is a 3.6 x 2.1  cm left upper lobe perihilar mass (series 3/image 41). Radiating linear  foci of consolidation extend more peripherally into the more superior  left upper lobe (131 series 3/image 28). There are small intermixed more  discrete nodular foci of consolidation. Nodular thickening along much of  the adjacent left fissure (series 3/image 44). Multiple additional solid  pulmonary nodules. Reference left apical 1.6 cm (series 3/image 14),  right upper lobe 1.2 cm (series 3/image 26) and left lower lobe sub-6 mm  (series 3/image 58). Small amount of mesial right lower lobe subpleural  consolidation and groundglass opacities (series 3/image 98). Background  advanced emphysema. No pleural effusion.     Noncirrhotic liver morphology. Numerous (greater than 20) hypoenhancing  bilobar hepatic lesions. Reference 2.4 cm segment LOIDA lesion (series  2/image 91) and 2.1 cm segment VII lesion (series 2/image 99). Normal  bilateral adrenal glands. Remaining solid organs and bowel are normal.  No abdominal pelvic adenopathy.             Impression:      1. Findings suggesting a primary left upper lobe malignancy with doreen  (left hilar, mediastinal and left  cervical), pulmonary, hepatic and  osseous metastatic disease. Recommend tissue sampling and oncologic  consultation.  2. Left upper lobe perihilar mass/left hilar lymphadenopathy causes  moderate narrowing of the mid left main pulmonary artery and left upper  lobe segmental pulmonary arteries which remain patent where seen.  Lymphadenopathy also causes severe narrowing/stenosis of the left upper  lobe proximal segmental bronchi. Left upper lobe remains aerated.        This report was finalized on 9/20/2024 12:37 PM by Dr. Jose Wood M.D on Workstation: [id number]       CT Abdomen Pelvis With Contrast [[id number]] Collected: 09/20/24 1228     Updated: 09/20/24 1240    Narrative:      CT ABDOMEN PELVIS W CONTRAST-, CT CHEST W CONTRAST DIAGNOSTIC-     HISTORY: 40 pound unintentional weight loss     TECHNIQUE: Radiation dose reduction techniques were utilized, including  automated exposure control and exposure modulation based on body size. 3  mm images were obtained through the chest abdomen and pelvis after the  administration of IV contrast.  3D reformat images were created.  Multiple coronal, sagittal, and 3-D reconstruction images were obtained.        COMPARISON: None available        FINDINGS: Subtle sclerotic changes throughout the axial and proximal  appendicular skeleton with multiple more discrete sclerotic lesions most  conspicuous in the bilateral intertrochanteric femurs (series 5/image  80).     Heart/is normal in size. Severe coronary artery calcifications. Trace  pericardial fluid. Nondilated main pulmonary artery and thoracic aorta.  Left hilar and mediastinal lymphadenopathy. Reference 4.3 x 2.2 cm  prevascular doreen conglomeration (series 2/image 30). Reference 2.2 cm  left hilar lymph node (series 2/image 47). Lymphadenopathy causes  moderate narrowing of the mid left main pulmonary artery and left upper  lobe segmental pulmonary arteries which remain patent where seen. Left  level IV  lymphadenopathy. Reference 1 cm lymph node (series 2/image 9).  Nondilated esophagus.     Left hilar lymphadenopathy causes resulting severe narrowing/stenosis of  the left upper lobe proximal segmental bronchi. Left upper lobe remains  aerated. Contiguous with the left hilar lymphadenopathy is a 3.6 x 2.1  cm left upper lobe perihilar mass (series 3/image 41). Radiating linear  foci of consolidation extend more peripherally into the more superior  left upper lobe (131 series 3/image 28). There are small intermixed more  discrete nodular foci of consolidation. Nodular thickening along much of  the adjacent left fissure (series 3/image 44). Multiple additional solid  pulmonary nodules. Reference left apical 1.6 cm (series 3/image 14),  right upper lobe 1.2 cm (series 3/image 26) and left lower lobe sub-6 mm  (series 3/image 58). Small amount of mesial right lower lobe subpleural  consolidation and groundglass opacities (series 3/image 98). Background  advanced emphysema. No pleural effusion.     Noncirrhotic liver morphology. Numerous (greater than 20) hypoenhancing  bilobar hepatic lesions. Reference 2.4 cm segment LOIDA lesion (series  2/image 91) and 2.1 cm segment VII lesion (series 2/image 99). Normal  bilateral adrenal glands. Remaining solid organs and bowel are normal.  No abdominal pelvic adenopathy.             Impression:      1. Findings suggesting a primary left upper lobe malignancy with doreen  (left hilar, mediastinal and left cervical), pulmonary, hepatic and  osseous metastatic disease. Recommend tissue sampling and oncologic  consultation.  2. Left upper lobe perihilar mass/left hilar lymphadenopathy causes  moderate narrowing of the mid left main pulmonary artery and left upper  lobe segmental pulmonary arteries which remain patent where seen.  Lymphadenopathy also causes severe narrowing/stenosis of the left upper  lobe proximal segmental bronchi. Left upper lobe remains aerated.        This report  was finalized on 9/20/2024 12:37 PM by Dr. Jose Wood M.D on Workstation: [id number]       XR Chest 1 View [[id number]] Collected: 09/19/24 2050     Updated: 09/19/24 2055    Narrative:      XR CHEST 1 VW-     Clinical: Shortness of breath     COMPARISON: None     FINDINGS: Cardiac size within normal limits. There is atherosclerotic  calcification of the aorta. There is a moderate amount of left upper  lobe infiltrate seen, consistent with pneumonia. Very subtle right upper  lobe infiltrate is suggested. No pleural effusion or pulmonary edema is  demonstrated. Advise short-term follow-up to document clearing.  Remainder is unremarkable.     This report was finalized on 9/19/2024 8:51 PM by Dr. 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  Agree with hematology consult for probable primary metastatic lung cancer  Discussed with Dr. Prajapati.  Will follow-up in a.m.    Continue to monitor electrolytes and volume closely, avoid IV contrast and nephrotoxic medications     I appreciate the consult request.  Please send me a secure chat message with any nonurgent questions regarding patient care.  For any urgent patient care issues please call my  office number below.      Armand Castle MD  [facility]  Office phone number: [phone]  Answering service phone number: [phone]      9/20/2024        Dictation via Dragon dictation software      Electronically signed by Armand Castle MD at 09/20/24 9777

## 2024-09-20 NOTE — H&P
Patient Name:  Kranthi Farley  YOB: 1960  MRN:  [id number]  Admit Date:  2024  Patient Care Team:  Alexandra Keller PA-C as PCP - General (Physician Assistant)      Subjective   History Present Illness     Chief Complaint   Patient presents with    Abnormal Lab       History of Present Illness  Ms. Farley is a female with a past medical history of emphysema and hyponatremia presents to [facility] ED per instructions of primary care provider for low potassium level and  white blood cell count was that completed yesterday.  Patient reports that she does have generalized malaise and shortness of breath that is baseline for her.  She reports that she has had a 40 pound weight loss since April and her primary care provider is following that.  She reports that she has not had a change in her appetite or any change in the amount of fluid that she is eating and drinking.  She reports that she has not had any sick contacts.  She denies dizziness, headache, chest pain, abdominal pain, nausea, vomiting, diarrhea, difficulty urinating, or difficulty ambulating.  She does report that her legs have been weak she attributes that to her 40 pound weight loss.    Review of Systems   Constitutional:  Positive for unexpected weight change. Negative for appetite change and fever.   Respiratory:  Positive for shortness of breath.    Cardiovascular:  Negative for chest pain.   Gastrointestinal:  Negative for abdominal pain, diarrhea, nausea and vomiting.   Genitourinary:  Negative for difficulty urinating.   Neurological:  Negative for dizziness and headaches.        Personal History     Past Medical History:   Diagnosis Date    Cancer     SKIN REMOVED    Cholesteatoma     BILATERAL EARS    PONV (postoperative nausea and vomiting)      Past Surgical History:   Procedure Laterality Date    BLADDER REPAIR       SECTION      INNER EAR SURGERY Bilateral     CHOLESTEOMA    VULVA BIOPSY Right  11/12/2021    Procedure: WIDE LOCAL EXCISION OF VULVA LESION;  Surgeon: Dwayne Sood MD;  [place] [facility] [place]  Service: Gynecology;  Laterality: Right;     Family History   Problem Relation Age of Onset    Malig Hyperthermia Neg Hx      Social History     Tobacco Use    Smoking status: Every Day     Current packs/day: 1.00     Types: Cigarettes    Smokeless tobacco: Never    Tobacco comments:     INST. PER ANESTHESIA PROTOCOL   Vaping Use    Vaping status: Never Used   Substance Use Topics    Alcohol use: Never    Drug use: Never     No current facility-administered medications on file prior to encounter.     Current Outpatient Medications on File Prior to Encounter   Medication Sig Dispense Refill    ibuprofen (ADVIL,MOTRIN) 600 MG tablet Take 1 tablet by mouth Every 6 (Six) Hours As Needed for Mild Pain . 30 tablet 0    oxyCODONE-acetaminophen (PERCOCET) 5-325 MG per tablet Take 1-2 tablets by mouth Every 4 (Four) Hours As Needed (Pain). 5 tablet 0    tobramycin-dexamethasone (TOBRADEX) 0.3-0.1 % ophthalmic suspension 3 drops 2 (Two) Times a Day. USES IN EARS       No Known Allergies    Objective    Objective     Vital Signs  Temp:  [98 °F (36.7 °C)] 98 °F (36.7 °C)  Heart Rate:  [107] 107  Resp:  [18] 18  BP: (197)/(104) 197/104  SpO2:  [92 %] 92 %  on   ;   Device (Oxygen Therapy): room air  Body mass index is 20.32 kg/m².    Physical Exam  Vitals and nursing note reviewed.   Constitutional:       General: She is not in acute distress.     Appearance: She is not ill-appearing.   Cardiovascular:      Rate and Rhythm: Normal rate and regular rhythm.      Pulses: Normal pulses.      Heart sounds: Normal heart sounds.   Pulmonary:      Effort: Pulmonary effort is normal. No respiratory distress.      Breath sounds: Decreased breath sounds present.      Comments: Decreased breath sounds to all lobes  Abdominal:      General: Bowel sounds are normal. There is no distension.      Palpations: Abdomen is soft.       Tenderness: There is no abdominal tenderness.   Musculoskeletal:         General: Normal range of motion.      Right lower leg: No edema.      Left lower leg: No edema.   Skin:     General: Skin is warm and dry.      Coloration: Skin is pale.   Neurological:      Mental Status: She is oriented to person, place, and time.         Results Review:  I reviewed the patient's new clinical results.  I reviewed the patient's new imaging results.  I reviewed the patient's other test results.  Discussed with ED provider.    Lab Results (last 24 hours)       Procedure Component Value Units Date/Time    COMPREHENSIVE METABOLIC PANEL [[id number]]  (Abnormal) Collected: 09/19/24 0925     Updated: 09/19/24 1943    LIPID PANEL [[id number]]  (Abnormal) Collected: 09/19/24 0925     Updated: 09/19/24 1943    HEMOGLOBIN A1C [[id number]]  (Abnormal) Collected: 09/19/24 0925     Updated: 09/19/24 1943     Hemoglobin A1C 7.5 %      Mean Bld Glu Estim. 168 mg/dL     CBC AND DIFFERENTIAL [[id number]]  (Abnormal) Collected: 09/19/24 0925     Updated: 09/19/24 1943     WBC 19.98 10*3/uL      RBC 5.20 10*6/uL      Hemoglobin 14.6 g/dL      Hematocrit 43.7 %      MCV 84.0 fL      MCH 28.1 pg      MCHC 33.4 g/dL      RDW 14.0 %      Platelets 129 10*3/uL      MPV 9.2 fL      Differential Type       Physician Office CBC w/AutoDiff     (arb'U)     Neutrophil Rel % 87.2 %      Basophil Rel % 0.2 %      Basophils Absolute 0.04 10*3/uL      Eosinophil % 0.1 %      Eosinophils Absolute 0.02 10*3/uL      Immature Grans % 3.0 %      Immature Grans, Absolute 0.59 10*3/uL      Lymphocytes Absolute 1.05 10*3/uL      Lymphocyte Rel % 5.3 %      Monocytes Absolute 0.84 10*3/uL      Monocyte Rel % 4.2 %      Neutrophils Absolute 17.44 10*3/uL     TSH [[id number]] Collected: 09/19/24 0925     Updated: 09/19/24 1943    VITAMIN D,25-HYDROXY [[id number]]  (Abnormal) Collected: 09/19/24 0925     Updated: 09/19/24 1943     25 Hydroxy, Vitamin D 16.1 ng/mL      VITAMIN B12 [[id number]] Collected: 09/19/24 0925     Updated: 09/19/24 1943     Vitamin B-12 283 pg/mL     CBC & Differential [[id number]]  (Abnormal) Collected: 09/19/24 2006    Specimen: Blood from Arm Left Updated: 09/19/24 2020    Narrative:      The following orders were created for panel order CBC & Differential.  Procedure                               Abnormality         Status                     ---------                               -----------         ------                     CBC Auto Differential[[id number]]        Abnormal            Final result                 Please view results for these tests on the individual orders.    Comprehensive Metabolic Panel [[id number]]  (Abnormal) Collected: 09/19/24 2006    Specimen: Blood from Arm Left Updated: 09/19/24 2048     Glucose 190 mg/dL      BUN 19 mg/dL      Creatinine 0.67 mg/dL      Sodium 142 mmol/L      Potassium 2.3 mmol/L      Chloride 92 mmol/L      CO2 39.1 mmol/L      Calcium 9.6 mg/dL      Total Protein 6.7 g/dL      Albumin 3.9 g/dL      ALT (SGPT) 46 U/L      AST (SGOT) 31 U/L      Alkaline Phosphatase 143 U/L      Total Bilirubin 1.3 mg/dL      Globulin 2.8 gm/dL      A/G Ratio 1.4 g/dL      BUN/Creatinine Ratio 28.4     Anion Gap 10.9 mmol/L      eGFR 97.7 mL/min/1.73     Narrative:      GFR Normal >60  Chronic Kidney Disease <60  Kidney Failure <15      Magnesium [[id number]]  (Normal) Collected: 09/19/24 2006    Specimen: Blood from Arm Left Updated: 09/19/24 2044     Magnesium 2.0 mg/dL     CBC Auto Differential [[id number]]  (Abnormal) Collected: 09/19/24 2006    Specimen: Blood from Arm Left Updated: 09/19/24 2020     WBC 17.19 10*3/mm3      RBC 4.99 10*6/mm3      Hemoglobin 14.2 g/dL      Hematocrit 42.0 %      MCV 84.2 fL      MCH 28.5 pg      MCHC 33.8 g/dL      RDW 14.2 %      RDW-SD 42.6 fl      MPV 8.4 fL      Platelets 109 10*3/mm3      Neutrophil % 86.1 %      Lymphocyte % 6.3 %      Monocyte % 4.7 %      Eosinophil % 0.0 %       Basophil % 0.2 %      Immature Grans % 2.7 %      Neutrophils, Absolute 14.81 10*3/mm3      Lymphocytes, Absolute 1.08 10*3/mm3      Monocytes, Absolute 0.81 10*3/mm3      Eosinophils, Absolute 0.00 10*3/mm3      Basophils, Absolute 0.03 10*3/mm3      Immature Grans, Absolute 0.46 10*3/mm3      nRBC 0.0 /100 WBC     Single High Sensitivity Troponin T [[id number]]  (Abnormal) Collected: 09/19/24 2006    Specimen: Blood from Arm, Left Updated: 09/19/24 2053     HS Troponin T 16 ng/L     Narrative:      High Sensitive Troponin T Reference Range:  <14.0 ng/L- Negative Female for AMI  <22.0 ng/L- Negative Male for AMI  >=14 - Abnormal Female indicating possible myocardial injury.  >=22 - Abnormal Male indicating possible myocardial injury.   Clinicians would have to utilize clinical acumen, EKG, Troponin, and serial changes to determine if it is an Acute Myocardial Infarction or myocardial injury due to an underlying chronic condition.         BNP [[id number]]  (Normal) Collected: 09/19/24 2006    Specimen: Blood from Arm, Left Updated: 09/19/24 2053     proBNP 709.0 pg/mL     Narrative:      This assay is used as an aid in the diagnosis of individuals suspected of having heart failure. It can be used as an aid in the diagnosis of acute decompensated heart failure (ADHF) in patients presenting with signs and symptoms of ADHF to the emergency department (ED). In addition, NT-proBNP of <300 pg/mL indicates ADHF is not likely.    Age Range Result Interpretation  NT-proBNP Concentration (pg/mL:      <50             Positive            >450                   Gray                 300-450                    Negative             <300    50-75           Positive            >900                  Gray                300-900                  Negative            <300      >75             Positive            >1800                  Gray                300-1800                  Negative            <300    Procalcitonin [[id number]]  Collected: 09/19/24 2006    Specimen: Blood from Arm, Left Updated: 09/19/24 2121            Imaging Results (Last 24 Hours)       Procedure Component Value Units Date/Time    XR Chest 1 View [[id number]] Collected: 09/19/24 2050     Updated: 09/19/24 2055    Narrative:      XR CHEST 1 VW-     Clinical: Shortness of breath     COMPARISON: None     FINDINGS: Cardiac size within normal limits. There is atherosclerotic  calcification of the aorta. There is a moderate amount of left upper  lobe infiltrate seen, consistent with pneumonia. Very subtle right upper  lobe infiltrate is suggested. No pleural effusion or pulmonary edema is  demonstrated. Advise short-term follow-up to document clearing.  Remainder is unremarkable.     This report was finalized on 9/19/2024 8:51 PM by Dr. Keo Aragon M.D  on Workstation: [id number]                 ECG 12 Lead Dyspnea   Preliminary Result   HEART RATE=73  bpm   RR Jywiiapu=269  ms   PA Brlhmabs=003  ms   P Horizontal Axis=0  deg   P Front Axis=88  deg   QRSD Interval=99  ms   QT Xbofkkwl=135  ms   AYzU=819  ms   QRS Axis=61  deg   T Wave Axis=85  deg   - ABNORMAL ECG -   Sinus rhythm   Prolonged QT interval   Date and Time of Study:2024-09-19 20:45:05        Assessment/Plan   Assessment & Plan   Active Hospital Problems    Diagnosis  POA    **Hypokalemia [E87.6]  Yes    Leukocytosis [D72.829]  Yes    Thrombocytopenia [D69.6]  Yes    CAP (community acquired pneumonia) [J18.9]  Yes    Type 2 diabetes mellitus with hyperglycemia, without long-term current use of insulin [E11.65]  Yes      Resolved Hospital Problems   No resolved problems to display.       Ms. Farley is a female with a past medical history of emphysema and hyponatremia presents to [facility] ED per instructions of primary care provider for low potassium level and  white blood cell count was that completed yesterday.      Hypokalemia  -Potassium noted to be 2.3 on admission  -Will replace  potassium per protocol  -Recheck BMP in the morning  -Telemetry monitoring    Leukocytosis  Community-acquired pneumonia  -WBC 17.19  -Chest x-ray-moderate left upper lobe infiltrate consistent with pneumonia, subtle right upper lobe infiltrate   -Afebrile on admission to the ED   -Will check respiratory viral panel, MRSA PCR, strep pneumo, and Legionella   -Check lactic acid  -Will check urinalysis and blood cultures   -Will order ceftriaxone and azithromycin   -DuoNebs as needed  -Continuous pulse oximetry   -Supplemental oxygen to maintain O2 sats greater than 90%  -Repeat CBC in the morning      Type 2 diabetes mellitus without long term current use of insulin with hyperglycemia   -Patient reports a 40 pound unintentional weight loss since April  -Most recent A1c: 7.5 today at her PCPs office  -Not currently on any oral medications for diabetes  -Glucose checks before meals and at bedtime  -Correctional sliding scale insulin for hyperglycemia  -Consult diabetes educator  -Consult nutrition  -Healthy heart consistent carb diet      Thrombocytopenia  -Platelets noted to be 109  -Will check PT/INR  -Will monitor for bleeding  -Fall precautions  -Recheck CBC in the morning      SCDs for DVT prophylaxis.  Full code.  Discussed with patient.      DEMARCO Goodson  [facility]  09/19/24  21:28 EDT

## 2024-09-20 NOTE — PROGRESS NOTES
Name: Kranthi Farley ADMIT: 2024   : 1960  PCP: Alexandra Keller PA-C    MRN: [id number] LOS: 1 days   AGE/SEX: 64 y.o. female  ROOM: [facility]     Subjective   Subjective   Patient awake and resting in bed, feeling better from arrival, still with fatigue.       Objective   Objective   Vital Signs  Temp:  [97.9 °F (36.6 °C)-98 °F (36.7 °C)] 97.9 °F (36.6 °C)  Heart Rate:  [] 75  Resp:  [18] 18  BP: (162-198)/() 162/93  SpO2:  [92 %-95 %] 95 %  on   ;   Device (Oxygen Therapy): room air  Body mass index is 20.35 kg/m².  Physical Exam  Vitals and nursing note reviewed.   Constitutional:       General: She is not in acute distress.     Appearance: She is ill-appearing.   Cardiovascular:      Rate and Rhythm: Normal rate and regular rhythm.      Pulses: Normal pulses.      Heart sounds: Normal heart sounds.   Pulmonary:      Effort: Pulmonary effort is normal. No respiratory distress.      Breath sounds: Decreased breath sounds present.   Abdominal:      General: Bowel sounds are normal.      Palpations: Abdomen is soft.      Tenderness: There is no abdominal tenderness.   Musculoskeletal:      Right lower leg: No edema.      Left lower leg: No edema.   Skin:     General: Skin is warm and dry.   Neurological:      Mental Status: She is alert.       Results Review     I reviewed the patient's new clinical results.  Results from last 7 days   Lab Units 24  1003 24  0925   WBC 10*3/mm3 13.70* 17.19* 19.98*   HEMOGLOBIN g/dL 12.3 14.2 14.6   PLATELETS 10*3/mm3 104* 109* 129*     Results from last 7 days   Lab Units 24  1003 24   SODIUM mmol/L 142 142   POTASSIUM mmol/L 3.0* 2.3*   CHLORIDE mmol/L 96* 92*   CO2 mmol/L 37.4* 39.1*   BUN mg/dL 18 19   CREATININE mg/dL 0.65 0.67   GLUCOSE mg/dL 166* 190*   EGFR mL/min/1.73 98.5 97.7     Results from last 7 days   Lab Units 24   ALBUMIN g/dL 3.9   BILIRUBIN mg/dL 1.3*   ALK PHOS U/L 143*   AST  (SGOT) U/L 31   ALT (SGPT) U/L 46*     Results from last 7 days   Lab Units 09/20/24  1003 09/19/24 2006   CALCIUM mg/dL 9.0 9.6   ALBUMIN g/dL  --  3.9   MAGNESIUM mg/dL  --  2.0     Results from last 7 days   Lab Units 09/20/24  0001 09/19/24 2006   PROCALCITONIN ng/mL  --  1.75*   LACTATE mmol/L 1.2  --      Hemoglobin A1C   Date/Time Value Ref Range Status   09/19/2024 0925 7.5 (H) 4.3 - 5.6 % Final     Glucose   Date/Time Value Ref Range Status   09/20/2024 1120 80 70 - 130 mg/dL Final   09/20/2024 0529 165 (H) 70 - 130 mg/dL Final       CT Chest With Contrast Diagnostic    Result Date: 9/20/2024  1. 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 Recommend tissue sampling and oncologic consultation. 2. Left upper lobe perihilar mass/left hilar lymphadenopathy causes moderate narrowing of the mid left main pulmonary artery and left upper lobe segmental pulmonary arteries which remain patent where seen. Lymphadenopathy also causes severe narrowing/stenosis of the left upper lobe proximal segmental bronchi. Left upper lobe remains aerated.   This report was finalized on 9/20/2024 12:37 PM by Dr. Jose Wood M.D on Workstation: [id number]      CT Abdomen Pelvis With Contrast    Result Date: 9/20/2024  1. 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 Recommend tissue sampling and oncologic consultation. 2. Left upper lobe perihilar mass/left hilar lymphadenopathy causes moderate narrowing of the mid left main pulmonary artery and left upper lobe segmental pulmonary arteries which remain patent where seen. Lymphadenopathy also causes severe narrowing/stenosis of the left upper lobe proximal segmental bronchi. Left upper lobe remains aerated.   This report was finalized on  9/20/2024 12:37 PM by Dr. Jose Wood M.D on Workstation: [id number]       I have personally reviewed all medications:  Scheduled Medications  azithromycin, 500 mg, Intravenous, Q24H  budesonide-formoterol, 2 puff, Inhalation, BID - RT  cefTRIAXone, 2,000 mg, Intravenous, Q24H  enoxaparin, 40 mg, Subcutaneous, Q24H  insulin lispro, 2-7 Units, Subcutaneous, 4x Daily AC & at Bedtime  potassium chloride ER, 40 mEq, Oral, Q4H  tiotropium bromide monohydrate, 2 puff, Inhalation, Daily - RT    Infusions  Pharmacy Consult,   sodium chloride 0.9 % with KCl 20 mEq, 100 mL/hr, Last Rate: 100 mL/hr (09/20/24 0916)    Diet  Diet: Cardiac, Diabetic; Healthy Heart (2-3 Na+); Consistent Carbohydrate; Fluid Consistency: Thin (IDDSI 0)    I have personally reviewed:  [x]  Laboratory   [x]  Microbiology   [x]  Radiology   [x]  EKG/Telemetry  []  Cardiology/Vascular   []  Pathology    []  Records       Assessment/Plan     Active Hospital Problems    Diagnosis  POA    **Hypokalemia [E87.6]  Yes    Pulmonary nodules [R91.8]  Yes    Abnormal CT of the chest [R93.89]  Yes    Elevated blood pressure reading without diagnosis of hypertension [R03.0]  Yes    Leukocytosis [D72.829]  Yes    Thrombocytopenia [D69.6]  Yes    CAP (community acquired pneumonia) [J18.9]  Yes    Type 2 diabetes mellitus with hyperglycemia, without long-term current use of insulin [E11.65]  Yes      Resolved Hospital Problems   No resolved problems to display.       64 y.o. female admitted with Hypokalemia.    Pulmonary nodules  Perihilar mass  Lymphadenopathy  Abnormal weight loss, tobacco use  - Patient with weight loss over past several months, coupled with ongoing tobacco use. CT Chest obtained, results have returned today (09/20), impression suggesting primary left upper lobe malignancy with doreen (left hilar, mediastinal and left cervical), pulmonary, hepatic and osseous metastatic disease. Oncology consultation recommended, will place consult now.  -  Also noting multiple pulmonary nodules, and as above mass/adenopathy, tissue sampling recommended, will consult Pulmonology to assess and see if endoscopic intervention can be pursued.    Pneumonia  Leukocytosis  - Findings of pneumonia noted on imaging, coupled with leukocytosis, elevated procalcitonin. UA is also abnormal, but patient without urinary complaints.  RVP/MRSA/strep/legionella negative, blood cultures pending.  - Continue empiric antibiotic therapy for CAP. Follow up culture results, trend WBC count.    Elevated blood pressure without prior diagnosis of hypertension, newly diagnosed hypertension  Hypokalemia  Metabolic alkalosis  - Elevated BP noted, patient not on antihypertensive medications, multiple elevations noted, will classify as newly diagnosed hypertension. Potassium low, metabolic alkalosis noted, concern for possible hyperaldosteronism.  - Aldosterone/renin pending, will follow up these results. Discussed with Dr. Castle, given that she has newly diagnosed hypertension, coupled with hypokalemia and metabolic alkalosis, strong suspicion for hyperaldosteronism. Likely going to go ahead and start Aldactone now. Dr. Castle is going to come see patient, greatly appreciate his help.  - Check magnesium level. Consider urine studies.    Type 2 diabetes mellitus with hyperglycemia  - Most recent A1c: 7.50%  - Current treatment regimen: SSI; Glucose acceptable, continue SSI as prescribed.  - Will monitor 24 hour insulin requirements and adjust as needed to achieve target inpatient range of 140-180.  - Diabetic diet    Thrombocytopenia  - Platelets found to be low on most recent labs. No indications for urgent intervention at present. Will monitor for now.  - Order repeat CBC in AM for reassessment.    COPD  - No evidence to suggest acute exacerbation.  Continue current medications as prescribed and closely monitor.  - Supplemental oxygen as needed to maintain O2 sats 88 to 92%, pulse oximetry,  telemetry monitoring.    Transaminitis  - Noted on labs, abdominal exam benign, CT showing abnormal findings involving liver, monitor for now with CMP.    SCDs for DVT prophylaxis.  Full code.  Discussed with patient and nursing staff.  Anticipate discharge home timing yet to be determined.  Expected Discharge Date: 9/23/2024; Expected Discharge Time:       Royer Prajapati DO  [facility]  09/20/24

## 2024-09-20 NOTE — PLAN OF CARE
Goal Outcome Evaluation:  Plan of Care Reviewed With: patient        Progress: no change  Outcome Evaluation:     New pt to [address].       A&O x4. BP elevated. SR on telemetry. Room air.       K replacement in progress.     IVF's infusing.       Accuchecks.   Daily weights.           Will continue to monitor

## 2024-09-20 NOTE — CONSULTS
[facility]                                                                                             Nephrology Initial Consult Note    Patient Identification:  Name: Kranthi Farley MRN: [id number]  Age: 64 y.o. : 1960  Sex: female  Date:2024    Requesting Physician: As per consult order.  Reason for Consultation: Hypokalemia, metabolic acidosis with hypertension  Information from:patient/ family/ chart      History of Present Illness: This is a 64 y.o. year old female with a history of COPD, emphysema and tobacco abuse.  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  She was initially mated with fatigue and malaise, 40 pound weight loss over the last 3 months.  Admission labs shows significant hypokalemia, hypertension on initial vitals with BP as high as 198/89.  Her CO2 was 39 initially.  Potassium remains low despite replacement.  CT shows upper lobe infiltrate consistent with malignancy and evidence of metastatic disease on imaging.  Appetite low but no current nausea or vomiting.  She is not on any diuretics or laxatives at home    The following medical history and medications personally reviewed by me:    Problem List:     Hypokalemia    Leukocytosis    Thrombocytopenia    CAP (community acquired pneumonia)    Type 2 diabetes mellitus with hyperglycemia, without long-term current use of insulin    Pulmonary nodules    Abnormal CT of the chest    Elevated blood pressure reading without diagnosis of hypertension      Past Medical History:  Past Medical History:   Diagnosis Date    Cancer     SKIN REMOVED    Cholesteatoma     BILATERAL EARS    PONV (postoperative nausea and vomiting)        Past Surgical History:  Past Surgical History:   Procedure Laterality Date    BLADDER REPAIR       SECTION      INNER EAR SURGERY Bilateral     CHOLESTEOMA    VULVA BIOPSY Right  11/12/2021    Procedure: WIDE LOCAL EXCISION OF VULVA LESION;  Surgeon: Dwayne Sood MD;  Location: [facility] OR [facility];  Service: Gynecology;  Laterality: Right;        Home Meds:   No medications prior to admission.       Current Meds:   Current Facility-Administered Medications   Medication Dose Route Frequency Provider Last Rate Last Admin    acetaminophen (TYLENOL) tablet 650 mg  650 mg Oral Q4H PRN Lashell Ovalles APRN        azithromycin (ZITHROMAX) 500 mg in sodium chloride 0.9 % 250 mL IVPB-VTB  500 mg Intravenous Q24H Lashell Ovalles APRN   Stopped at 09/20/24 0414    sennosides-docusate (PERICOLACE) 8.6-50 MG per tablet 2 tablet  2 tablet Oral BID PRN Lashell Ovalles APRN        And    polyethylene glycol (MIRALAX) packet 17 g  17 g Oral Daily PRN Lashell Ovalles APRN        And    bisacodyl (DULCOLAX) EC tablet 5 mg  5 mg Oral Daily PRN Lashell Ovalles APRN        And    bisacodyl (DULCOLAX) suppository 10 mg  10 mg Rectal Daily PRN Lashell Ovalles APRN        budesonide-formoterol (SYMBICORT) 160-4.5 MCG/ACT inhaler 2 puff  2 puff Inhalation BID - RT Kelly Reardon MD        Calcium Replacement - Follow Nurse / BPA Driven Protocol   Does not apply PRN Royer Prajapati,         cefTRIAXone (ROCEPHIN) 2,000 mg in sodium chloride 0.9 % 100 mL MBP  2,000 mg Intravenous Q24H Lashell Ovalles APRN   Stopped at 09/20/24 0045    dextrose (D50W) (25 g/50 mL) IV injection 25 g  25 g Intravenous Q15 Min PRN Lashell Ovalles APRN        dextrose (GLUTOSE) oral gel 15 g  15 g Oral Q15 Min PRN Lashell Ovalles APRN        Enoxaparin Sodium (LOVENOX) syringe 40 mg  40 mg Subcutaneous Q24H Kelly Reardon MD   40 mg at 09/20/24 0917    glucagon (GLUCAGEN) injection 1 mg  1 mg Intramuscular Q15 Min PRN Lashell Ovalles APRN        insulin lispro (HUMALOG/ADMELOG) injection 2-7 Units  2-7 Units Subcutaneous 4x Daily AC & at Bedtime Lashell Ovalles APRN   2 Units at 09/20/24  0917    ipratropium-albuterol (DUO-NEB) nebulizer solution 3 mL  3 mL Nebulization Q6H PRN Lashell Ovalles APRN        Magnesium Standard Dose Replacement - Follow Nurse / BPA Driven Protocol   Does not apply PRN Royer Prajapati,         nitroglycerin (NITROSTAT) SL tablet 0.4 mg  0.4 mg Sublingual Q5 Min PRN Lashell Ovalles APRGORDON        ondansetron ODT (ZOFRAN-ODT) disintegrating tablet 4 mg  4 mg Oral Q6H PRN Lashell Ovalles APRGORDON        Or    ondansetron (ZOFRAN) injection 4 mg  4 mg Intravenous Q6H PRN Lashell Ovalles APRN        Pharmacy Consult   Does not apply Continuous PRN Kelly Reardon MD        Phosphorus Replacement - Follow Nurse / BPA Driven Protocol   Does not apply PRN Royer Prajapati,         potassium chloride (K-DUR,KLOR-CON) ER tablet 40 mEq  40 mEq Oral Q4H Royer Prajapati DO   40 mEq at 24 1124    Potassium Replacement - Follow Nurse / BPA Driven Protocol   Does not apply PRN Jose Bangura MD        Potassium Replacement - Follow Nurse / BPA Driven Protocol   Does not apply PRN Royer Prajapati, DO        sodium chloride 0.9 % with KCl 20 mEq/L infusion  100 mL/hr Intravenous Continuous Lashell Ovalles APRN 100 mL/hr at 24 0916 100 mL/hr at 24 0916    tiotropium (SPIRIVA RESPIMAT) 2.5 mcg/act aerosol solution inhaler  2 puff Inhalation Daily - RT Kelly Reardon MD           Allergies:  No Known Allergies    Social History:   Social History     Socioeconomic History    Marital status:    Tobacco Use    Smoking status: Former     Types: Cigarettes     Start date:      Quit date: 1974     Years since quittin.7    Smokeless tobacco: Never    Tobacco comments:     INST. PER ANESTHESIA PROTOCOL   Vaping Use    Vaping status: Never Used   Substance and Sexual Activity    Alcohol use: Never    Drug use: Never    Sexual activity: Defer        Family History:  Family History   Problem Relation Age of Onset    Malig Hyperthermia Neg Hx      "    Review of Systems: as per HPI, in addition:    General:      + Weakness / fatigue,                       No fevers / chills                       no weight loss  HEENT;       no dysphagia or visual changes  Neck:           normal range of motion, no swelling  Respiratory: no cough / congestion                      No shortness of air                       No wheezing  CV:              No chest pain                       No palpitations  Abdomen/GI: no nausea / vomiting                      No diarrhea / constipation                      No abdominal pain  :             no dysuria / urinary frequency                       No urgency, normal output  Endocrine:   no polyuria / polydipsia,                      No heat or cold intolerance  Skin:           Denies rashes or skin lesions   Vascular:   No edema  Musculoskeletal: + Joint pain or deformities      Physical Exam:  Vitals:   Temp (24hrs), Av °F (36.7 °C), Min:97.9 °F (36.6 °C), Max:98.2 °F (36.8 °C)    /73 (BP Location: Left arm, Patient Position: Lying)   Pulse 76   Temp 98.2 °F (36.8 °C) (Oral)   Resp 18   Ht 185.4 cm (72.99\")   Wt 69.9 kg (154 lb 3.2 oz)   SpO2 93%   BMI 20.35 kg/m²   Intake/Output:     Intake/Output Summary (Last 24 hours) at 2024 1454  Last data filed at 2024 1350  Gross per 24 hour   Intake 590 ml   Output --   Net 590 ml        Wt Readings from Last 1 Encounters:   24 0445 69.9 kg (154 lb 3.2 oz)   24 1944 69.9 kg (154 lb)       Exam:    General Appearance:  Awake, alert, no acute distress  Chronically ill-appearing   Head and Face:  Normocephalic, atraumatic, mucous membranes moist, oropharynx clear   Eyes:  No icterus, pupils equal round and reactive to light, extraocular movements intact    ENMT: Moist mucosa, tongue symmetric    Neck: Supple  no jugular venous distention  no thyromegaly   Pulmonary:  Respiratory effort: Normal  Auscultation of lungs: Clear bilaterally  No wheezes  No " rhonchi  Good air movement, good expansion   Chest wall:  No tenderness or deformity   Cardiovascular:  Auscultation of the heart: Normal rhythm, no murmurs  No edema of bilateral lower extremities   Abdomen:  Abdomen: soft, nontender, normal bowel sounds all four quadrants, no masses   Liver and spleen: no hepatosplenomegaly   Musculoskeletal: Digits and nails: normal  Normal range of motion  No joint swelling or gross deformities    Skin: Skin inspection: color normal, no visible rashes or lesions  Skin palpation: texture, turgor normal, no palpable lesions   Lymphatic:  no cervical lymphadenopathy    Psychiatric: Judgement and insight: normal  Orientation to person place and time: normal  Mood and affect: normal       DATA:  Radiology and Labs:  The following labs independently reviewed by me, additional AM labs ordered  Old records independently reviewed showing previously normal potassium levels, normal baseline creatinine  The following radiologic studies independently viewed by me, findings CT chest abdomen pelvis show primary left upper lobe malignancy with doreen osseous pulmonary and hepatic metastatic disease.  Interval notes, chart personally reviewed by me.  I have reviewed and summarized old records as detailed above  Plan of care discussed with Dr. Prajapati, patient  New problems include hypokalemia, uncontrolled hypertension, metabolic alkalosis      Risk/ complexity of medical care/ medical decision making: High complexity, multiple electrolyte abnormalities  Chronic illness with severe exacerbation or progression: CKD, hypertension and hypokalemia      Labs:   Recent Results (from the past 24 hour(s))   Comprehensive Metabolic Panel    Collection Time: 09/19/24  8:06 PM    Specimen: Arm, Left; Blood   Result Value Ref Range    Glucose 190 (H) 65 - 99 mg/dL    BUN 19 8 - 23 mg/dL    Creatinine 0.67 0.57 - 1.00 mg/dL    Sodium 142 136 - 145 mmol/L    Potassium 2.3 (C) 3.5 - 5.2 mmol/L    Chloride 92 (L)  98 - 107 mmol/L    CO2 39.1 (H) 22.0 - 29.0 mmol/L    Calcium 9.6 8.6 - 10.5 mg/dL    Total Protein 6.7 6.0 - 8.5 g/dL    Albumin 3.9 3.5 - 5.2 g/dL    ALT (SGPT) 46 (H) 1 - 33 U/L    AST (SGOT) 31 1 - 32 U/L    Alkaline Phosphatase 143 (H) 39 - 117 U/L    Total Bilirubin 1.3 (H) 0.0 - 1.2 mg/dL    Globulin 2.8 gm/dL    A/G Ratio 1.4 g/dL    BUN/Creatinine Ratio 28.4 (H) 7.0 - 25.0    Anion Gap 10.9 5.0 - 15.0 mmol/L    eGFR 97.7 >60.0 mL/min/1.73   Magnesium    Collection Time: 09/19/24  8:06 PM    Specimen: Arm, Left; Blood   Result Value Ref Range    Magnesium 2.0 1.6 - 2.4 mg/dL   CBC Auto Differential    Collection Time: 09/19/24  8:06 PM    Specimen: Arm, Left; Blood   Result Value Ref Range    WBC 17.19 (H) 3.40 - 10.80 10*3/mm3    RBC 4.99 3.77 - 5.28 10*6/mm3    Hemoglobin 14.2 12.0 - 15.9 g/dL    Hematocrit 42.0 34.0 - 46.6 %    MCV 84.2 79.0 - 97.0 fL    MCH 28.5 26.6 - 33.0 pg    MCHC 33.8 31.5 - 35.7 g/dL    RDW 14.2 12.3 - 15.4 %    RDW-SD 42.6 37.0 - 54.0 fl    MPV 8.4 6.0 - 12.0 fL    Platelets 109 (L) 140 - 450 10*3/mm3    Neutrophil % 86.1 (H) 42.7 - 76.0 %    Lymphocyte % 6.3 (L) 19.6 - 45.3 %    Monocyte % 4.7 (L) 5.0 - 12.0 %    Eosinophil % 0.0 (L) 0.3 - 6.2 %    Basophil % 0.2 0.0 - 1.5 %    Immature Grans % 2.7 (H) 0.0 - 0.5 %    Neutrophils, Absolute 14.81 (H) 1.70 - 7.00 10*3/mm3    Lymphocytes, Absolute 1.08 0.70 - 3.10 10*3/mm3    Monocytes, Absolute 0.81 0.10 - 0.90 10*3/mm3    Eosinophils, Absolute 0.00 0.00 - 0.40 10*3/mm3    Basophils, Absolute 0.03 0.00 - 0.20 10*3/mm3    Immature Grans, Absolute 0.46 (H) 0.00 - 0.05 10*3/mm3    nRBC 0.0 0.0 - 0.2 /100 WBC   Single High Sensitivity Troponin T    Collection Time: 09/19/24  8:06 PM    Specimen: Arm, Left; Blood   Result Value Ref Range    HS Troponin T 16 (H) <14 ng/L   BNP    Collection Time: 09/19/24  8:06 PM    Specimen: Arm, Left; Blood   Result Value Ref Range    proBNP 709.0 0.0 - 900.0 pg/mL   Procalcitonin    Collection Time:  09/19/24  8:06 PM    Specimen: Arm, Left; Blood   Result Value Ref Range    Procalcitonin 1.75 (H) 0.00 - 0.25 ng/mL   ECG 12 Lead Dyspnea    Collection Time: 09/19/24  8:45 PM   Result Value Ref Range    QT Interval 484 ms    QTC Interval 534 ms   Urinalysis With Microscopic If Indicated (No Culture) - Urine, Clean Catch    Collection Time: 09/19/24 10:57 PM    Specimen: Urine, Clean Catch   Result Value Ref Range    Color, UA Yellow Yellow, Straw    Appearance, UA Clear Clear    pH, UA 6.5 5.0 - 8.0    Specific Gravity, UA 1.026 1.005 - 1.030    Glucose, UA Negative Negative    Ketones, UA Negative Negative    Bilirubin, UA Negative Negative    Blood, UA Negative Negative    Protein,  mg/dL (2+) (A) Negative    Leuk Esterase, UA Small (1+) (A) Negative    Nitrite, UA Negative Negative    Urobilinogen, UA 1.0 E.U./dL 0.2 - 1.0 E.U./dL   Respiratory Panel PCR w/COVID-19(SARS-CoV-2) NIDA/LUCIAN/NIALL/PAD/COR/GENNY In-House, NP Swab in UTM/VTM, 2 HR TAT - Swab, Nasopharynx    Collection Time: 09/19/24 10:57 PM    Specimen: Nasopharynx; Swab   Result Value Ref Range    ADENOVIRUS, PCR Not Detected Not Detected    Coronavirus 229E Not Detected Not Detected    Coronavirus HKU1 Not Detected Not Detected    Coronavirus NL63 Not Detected Not Detected    Coronavirus OC43 Not Detected Not Detected    COVID19 Not Detected Not Detected - Ref. Range    Human Metapneumovirus Not Detected Not Detected    Human Rhinovirus/Enterovirus Not Detected Not Detected    Influenza A PCR Not Detected Not Detected    Influenza B PCR Not Detected Not Detected    Parainfluenza Virus 1 Not Detected Not Detected    Parainfluenza Virus 2 Not Detected Not Detected    Parainfluenza Virus 3 Not Detected Not Detected    Parainfluenza Virus 4 Not Detected Not Detected    RSV, PCR Not Detected Not Detected    Bordetella pertussis pcr Not Detected Not Detected    Bordetella parapertussis PCR Not Detected Not Detected    Chlamydophila pneumoniae PCR Not  Detected Not Detected    Mycoplasma pneumo by PCR Not Detected Not Detected   Legionella Antigen, Urine - Urine, Urine, Clean Catch    Collection Time: 09/19/24 10:57 PM    Specimen: Urine, Clean Catch   Result Value Ref Range    LEGIONELLA ANTIGEN, URINE Negative Negative   S. Pneumo Ag Urine or CSF - Urine, Urine, Clean Catch    Collection Time: 09/19/24 10:57 PM    Specimen: Urine, Clean Catch   Result Value Ref Range    Strep Pneumo Ag Negative Negative   Chloride, Urine, Random -    Collection Time: 09/19/24 10:57 PM    Specimen: Urine   Result Value Ref Range    Chloride, Urine 28 mmol/L   Urinalysis, Microscopic Only - Urine, Clean Catch    Collection Time: 09/19/24 10:57 PM    Specimen: Urine, Clean Catch   Result Value Ref Range    RBC, UA 0-2 None Seen, 0-2 /HPF    WBC, UA 3-5 (A) None Seen, 0-2 /HPF    Bacteria, UA Trace (A) None Seen /HPF    Squamous Epithelial Cells, UA 3-6 (A) None Seen, 0-2 /HPF    Renal Epithelial Cells, UA 3-6 (A) 0 - 2 /HPF    Hyaline Casts, UA 7-12 None Seen /LPF    Granular Casts, UA Present None Seen /LPF    Methodology Manual Light Microscopy    Protime-INR    Collection Time: 09/20/24 12:01 AM    Specimen: Arm, Right; Blood   Result Value Ref Range    Protime 14.6 (H) 11.7 - 14.2 Seconds    INR 1.12 (H) 0.90 - 1.10   Lactic Acid, Plasma    Collection Time: 09/20/24 12:01 AM    Specimen: Arm, Right; Blood   Result Value Ref Range    Lactate 1.2 0.5 - 2.0 mmol/L   MRSA Screen, PCR (Inpatient) - Swab, Nares    Collection Time: 09/20/24  4:54 AM    Specimen: Nares; Swab   Result Value Ref Range    MRSA PCR No MRSA Detected No MRSA Detected   POC Glucose Once    Collection Time: 09/20/24  5:29 AM    Specimen: Blood   Result Value Ref Range    Glucose 165 (H) 70 - 130 mg/dL   Basic Metabolic Panel    Collection Time: 09/20/24 10:03 AM    Specimen: Blood   Result Value Ref Range    Glucose 166 (H) 65 - 99 mg/dL    BUN 18 8 - 23 mg/dL    Creatinine 0.65 0.57 - 1.00 mg/dL    Sodium 142  136 - 145 mmol/L    Potassium 3.0 (L) 3.5 - 5.2 mmol/L    Chloride 96 (L) 98 - 107 mmol/L    CO2 37.4 (H) 22.0 - 29.0 mmol/L    Calcium 9.0 8.6 - 10.5 mg/dL    BUN/Creatinine Ratio 27.7 (H) 7.0 - 25.0    Anion Gap 8.6 5.0 - 15.0 mmol/L    eGFR 98.5 >60.0 mL/min/1.73   CBC (No Diff)    Collection Time: 09/20/24 10:03 AM    Specimen: Blood   Result Value Ref Range    WBC 13.70 (H) 3.40 - 10.80 10*3/mm3    RBC 4.36 3.77 - 5.28 10*6/mm3    Hemoglobin 12.3 12.0 - 15.9 g/dL    Hematocrit 36.8 34.0 - 46.6 %    MCV 84.4 79.0 - 97.0 fL    MCH 28.2 26.6 - 33.0 pg    MCHC 33.4 31.5 - 35.7 g/dL    RDW 13.9 12.3 - 15.4 %    RDW-SD 42.7 37.0 - 54.0 fl    MPV 9.1 6.0 - 12.0 fL    Platelets 104 (L) 140 - 450 10*3/mm3   Magnesium    Collection Time: 09/20/24 10:03 AM    Specimen: Blood   Result Value Ref Range    Magnesium 2.0 1.6 - 2.4 mg/dL   POC Glucose Once    Collection Time: 09/20/24 11:20 AM    Specimen: Blood   Result Value Ref Range    Glucose 80 70 - 130 mg/dL       Radiology:  Imaging Results (Last 24 Hours)       Procedure Component Value Units Date/Time    CT Chest With Contrast Diagnostic [[id number]] Collected: 09/20/24 1228     Updated: 09/20/24 1240    Narrative:      CT ABDOMEN PELVIS W CONTRAST-, CT CHEST W CONTRAST DIAGNOSTIC-     HISTORY: 40 pound unintentional weight loss     TECHNIQUE: Radiation dose reduction techniques were utilized, including  automated exposure control and exposure modulation based on body size. 3  mm images were obtained through the chest abdomen and pelvis after the  administration of IV contrast.  3D reformat images were created.  Multiple coronal, sagittal, and 3-D reconstruction images were obtained.        COMPARISON: None available        FINDINGS: Subtle sclerotic changes throughout the axial and proximal  appendicular skeleton with multiple more discrete sclerotic lesions most  conspicuous in the bilateral intertrochanteric femurs (series 5/image  80).     Heart/is normal in size.  Severe coronary artery calcifications. Trace  pericardial fluid. Nondilated main pulmonary artery and thoracic aorta.  Left hilar and mediastinal lymphadenopathy. Reference 4.3 x 2.2 cm  prevascular doreen conglomeration (series 2/image 30). Reference 2.2 cm  left hilar lymph node (series 2/image 47). Lymphadenopathy causes  moderate narrowing of the mid left main pulmonary artery and left upper  lobe segmental pulmonary arteries which remain patent where seen. Left  level IV lymphadenopathy. Reference 1 cm lymph node (series 2/image 9).  Nondilated esophagus.     Left hilar lymphadenopathy causes resulting severe narrowing/stenosis of  the left upper lobe proximal segmental bronchi. Left upper lobe remains  aerated. Contiguous with the left hilar lymphadenopathy is a 3.6 x 2.1  cm left upper lobe perihilar mass (series 3/image 41). Radiating linear  foci of consolidation extend more peripherally into the more superior  left upper lobe (131 series 3/image 28). There are small intermixed more  discrete nodular foci of consolidation. Nodular thickening along much of  the adjacent left fissure (series 3/image 44). Multiple additional solid  pulmonary nodules. Reference left apical 1.6 cm (series 3/image 14),  right upper lobe 1.2 cm (series 3/image 26) and left lower lobe sub-6 mm  (series 3/image 58). Small amount of mesial right lower lobe subpleural  consolidation and groundglass opacities (series 3/image 98). Background  advanced emphysema. No pleural effusion.     Noncirrhotic liver morphology. Numerous (greater than 20) hypoenhancing  bilobar hepatic lesions. Reference 2.4 cm segment LOIDA lesion (series  2/image 91) and 2.1 cm segment VII lesion (series 2/image 99). Normal  bilateral adrenal glands. Remaining solid organs and bowel are normal.  No abdominal pelvic adenopathy.             Impression:      1. Findings suggesting a primary left upper lobe malignancy with doreen  (left hilar, mediastinal and left  cervical), pulmonary, hepatic and  osseous metastatic disease. Recommend tissue sampling and oncologic  consultation.  2. Left upper lobe perihilar mass/left hilar lymphadenopathy causes  moderate narrowing of the mid left main pulmonary artery and left upper  lobe segmental pulmonary arteries which remain patent where seen.  Lymphadenopathy also causes severe narrowing/stenosis of the left upper  lobe proximal segmental bronchi. Left upper lobe remains aerated.        This report was finalized on 9/20/2024 12:37 PM by Dr. Jose Wood M.D on Workstation: [id number]       CT Abdomen Pelvis With Contrast [[id number]] Collected: 09/20/24 1228     Updated: 09/20/24 1240    Narrative:      CT ABDOMEN PELVIS W CONTRAST-, CT CHEST W CONTRAST DIAGNOSTIC-     HISTORY: 40 pound unintentional weight loss     TECHNIQUE: Radiation dose reduction techniques were utilized, including  automated exposure control and exposure modulation based on body size. 3  mm images were obtained through the chest abdomen and pelvis after the  administration of IV contrast.  3D reformat images were created.  Multiple coronal, sagittal, and 3-D reconstruction images were obtained.        COMPARISON: None available        FINDINGS: Subtle sclerotic changes throughout the axial and proximal  appendicular skeleton with multiple more discrete sclerotic lesions most  conspicuous in the bilateral intertrochanteric femurs (series 5/image  80).     Heart/is normal in size. Severe coronary artery calcifications. Trace  pericardial fluid. Nondilated main pulmonary artery and thoracic aorta.  Left hilar and mediastinal lymphadenopathy. Reference 4.3 x 2.2 cm  prevascular doreen conglomeration (series 2/image 30). Reference 2.2 cm  left hilar lymph node (series 2/image 47). Lymphadenopathy causes  moderate narrowing of the mid left main pulmonary artery and left upper  lobe segmental pulmonary arteries which remain patent where seen. Left  level IV  lymphadenopathy. Reference 1 cm lymph node (series 2/image 9).  Nondilated esophagus.     Left hilar lymphadenopathy causes resulting severe narrowing/stenosis of  the left upper lobe proximal segmental bronchi. Left upper lobe remains  aerated. Contiguous with the left hilar lymphadenopathy is a 3.6 x 2.1  cm left upper lobe perihilar mass (series 3/image 41). Radiating linear  foci of consolidation extend more peripherally into the more superior  left upper lobe (131 series 3/image 28). There are small intermixed more  discrete nodular foci of consolidation. Nodular thickening along much of  the adjacent left fissure (series 3/image 44). Multiple additional solid  pulmonary nodules. Reference left apical 1.6 cm (series 3/image 14),  right upper lobe 1.2 cm (series 3/image 26) and left lower lobe sub-6 mm  (series 3/image 58). Small amount of mesial right lower lobe subpleural  consolidation and groundglass opacities (series 3/image 98). Background  advanced emphysema. No pleural effusion.     Noncirrhotic liver morphology. Numerous (greater than 20) hypoenhancing  bilobar hepatic lesions. Reference 2.4 cm segment LOIDA lesion (series  2/image 91) and 2.1 cm segment VII lesion (series 2/image 99). Normal  bilateral adrenal glands. Remaining solid organs and bowel are normal.  No abdominal pelvic adenopathy.             Impression:      1. Findings suggesting a primary left upper lobe malignancy with doreen  (left hilar, mediastinal and left cervical), pulmonary, hepatic and  osseous metastatic disease. Recommend tissue sampling and oncologic  consultation.  2. Left upper lobe perihilar mass/left hilar lymphadenopathy causes  moderate narrowing of the mid left main pulmonary artery and left upper  lobe segmental pulmonary arteries which remain patent where seen.  Lymphadenopathy also causes severe narrowing/stenosis of the left upper  lobe proximal segmental bronchi. Left upper lobe remains aerated.        This report  was finalized on 9/20/2024 12:37 PM by Dr. Jose Wood M.D on Workstation: [id number]       XR Chest 1 View [[id number]] Collected: 09/19/24 2050     Updated: 09/19/24 2055    Narrative:      XR CHEST 1 VW-     Clinical: Shortness of breath     COMPARISON: None     FINDINGS: Cardiac size within normal limits. There is atherosclerotic  calcification of the aorta. There is a moderate amount of left upper  lobe infiltrate seen, consistent with pneumonia. Very subtle right upper  lobe infiltrate is suggested. No pleural effusion or pulmonary edema is  demonstrated. Advise short-term follow-up to document clearing.  Remainder is unremarkable.     This report was finalized on 9/19/2024 8:51 PM by Dr. 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  Agree with hematology consult for probable primary metastatic lung cancer  Discussed with Dr. Prajapati.  Will follow-up in a.m.    Continue to monitor electrolytes and volume closely, avoid IV contrast and nephrotoxic medications     I appreciate the consult request.  Please send me a secure chat message with any nonurgent questions regarding patient care.  For any urgent patient care issues please call my  office number below.      Armand Castle MD  [facility]  Office phone number: [phone]  Answering service phone number: [phone]      9/20/2024        Dictation via Dragon dictation software

## 2024-09-21 ENCOUNTER — APPOINTMENT (OUTPATIENT)
Dept: MRI IMAGING | Facility: HOSPITAL | Age: 64
End: 2024-09-21
Payer: COMMERCIAL

## 2024-09-21 PROBLEM — E43 SEVERE MALNUTRITION: Status: ACTIVE | Noted: 2024-09-21

## 2024-09-21 LAB
ALBUMIN SERPL-MCNC: 3.6 G/DL (ref 3.5–5.2)
ALBUMIN/GLOB SERPL: 1.4 G/DL
ALP SERPL-CCNC: 121 U/L (ref 39–117)
ALT SERPL W P-5'-P-CCNC: 44 U/L (ref 1–33)
ANION GAP SERPL CALCULATED.3IONS-SCNC: 7.7 MMOL/L (ref 5–15)
AST SERPL-CCNC: 32 U/L (ref 1–32)
BASOPHILS # BLD AUTO: 0.03 10*3/MM3 (ref 0–0.2)
BASOPHILS NFR BLD AUTO: 0.2 % (ref 0–1.5)
BILIRUB SERPL-MCNC: 0.6 MG/DL (ref 0–1.2)
BUN SERPL-MCNC: 11 MG/DL (ref 8–23)
BUN/CREAT SERPL: 22 (ref 7–25)
CALCIUM SPEC-SCNC: 8.9 MG/DL (ref 8.6–10.5)
CHLORIDE SERPL-SCNC: 99 MMOL/L (ref 98–107)
CO2 SERPL-SCNC: 34.3 MMOL/L (ref 22–29)
CREAT SERPL-MCNC: 0.5 MG/DL (ref 0.57–1)
DEPRECATED RDW RBC AUTO: 43.5 FL (ref 37–54)
EGFRCR SERPLBLD CKD-EPI 2021: 104.9 ML/MIN/1.73
EOSINOPHIL # BLD AUTO: 0.01 10*3/MM3 (ref 0–0.4)
EOSINOPHIL NFR BLD AUTO: 0.1 % (ref 0.3–6.2)
ERYTHROCYTE [DISTWIDTH] IN BLOOD BY AUTOMATED COUNT: 14.1 % (ref 12.3–15.4)
GLOBULIN UR ELPH-MCNC: 2.6 GM/DL
GLUCOSE BLDC GLUCOMTR-MCNC: 160 MG/DL (ref 70–130)
GLUCOSE BLDC GLUCOMTR-MCNC: 164 MG/DL (ref 70–130)
GLUCOSE BLDC GLUCOMTR-MCNC: 168 MG/DL (ref 70–130)
GLUCOSE BLDC GLUCOMTR-MCNC: 187 MG/DL (ref 70–130)
GLUCOSE SERPL-MCNC: 142 MG/DL (ref 65–99)
HCT VFR BLD AUTO: 37.6 % (ref 34–46.6)
HGB BLD-MCNC: 12.6 G/DL (ref 12–15.9)
IMM GRANULOCYTES # BLD AUTO: 0.62 10*3/MM3 (ref 0–0.05)
IMM GRANULOCYTES NFR BLD AUTO: 4.4 % (ref 0–0.5)
LYMPHOCYTES # BLD AUTO: 1.23 10*3/MM3 (ref 0.7–3.1)
LYMPHOCYTES NFR BLD AUTO: 8.7 % (ref 19.6–45.3)
MCH RBC QN AUTO: 28.4 PG (ref 26.6–33)
MCHC RBC AUTO-ENTMCNC: 33.5 G/DL (ref 31.5–35.7)
MCV RBC AUTO: 84.7 FL (ref 79–97)
MONOCYTES # BLD AUTO: 0.75 10*3/MM3 (ref 0.1–0.9)
MONOCYTES NFR BLD AUTO: 5.3 % (ref 5–12)
NEUTROPHILS NFR BLD AUTO: 11.55 10*3/MM3 (ref 1.7–7)
NEUTROPHILS NFR BLD AUTO: 81.3 % (ref 42.7–76)
NRBC BLD AUTO-RTO: 0 /100 WBC (ref 0–0.2)
PLATELET # BLD AUTO: 106 10*3/MM3 (ref 140–450)
PMV BLD AUTO: 8.9 FL (ref 6–12)
POTASSIUM SERPL-SCNC: 3.5 MMOL/L (ref 3.5–5.2)
POTASSIUM SERPL-SCNC: 3.5 MMOL/L (ref 3.5–5.2)
PROT SERPL-MCNC: 6.2 G/DL (ref 6–8.5)
RBC # BLD AUTO: 4.44 10*6/MM3 (ref 3.77–5.28)
SODIUM SERPL-SCNC: 141 MMOL/L (ref 136–145)
WBC NRBC COR # BLD AUTO: 14.19 10*3/MM3 (ref 3.4–10.8)

## 2024-09-21 PROCEDURE — 82948 REAGENT STRIP/BLOOD GLUCOSE: CPT

## 2024-09-21 PROCEDURE — 80053 COMPREHEN METABOLIC PANEL: CPT | Performed by: STUDENT IN AN ORGANIZED HEALTH CARE EDUCATION/TRAINING PROGRAM

## 2024-09-21 PROCEDURE — 99232 SBSQ HOSP IP/OBS MODERATE 35: CPT | Performed by: INTERNAL MEDICINE

## 2024-09-21 PROCEDURE — 25010000002 ENOXAPARIN PER 10 MG: Performed by: STUDENT IN AN ORGANIZED HEALTH CARE EDUCATION/TRAINING PROGRAM

## 2024-09-21 PROCEDURE — 97530 THERAPEUTIC ACTIVITIES: CPT

## 2024-09-21 PROCEDURE — 63710000001 INSULIN LISPRO (HUMAN) PER 5 UNITS

## 2024-09-21 PROCEDURE — 25810000003 SODIUM CHLORIDE 0.9 % SOLUTION 250 ML FLEX CONT

## 2024-09-21 PROCEDURE — 25010000002 AZITHROMYCIN PER 500 MG

## 2024-09-21 PROCEDURE — 85025 COMPLETE CBC W/AUTO DIFF WBC: CPT | Performed by: STUDENT IN AN ORGANIZED HEALTH CARE EDUCATION/TRAINING PROGRAM

## 2024-09-21 PROCEDURE — 97162 PT EVAL MOD COMPLEX 30 MIN: CPT

## 2024-09-21 PROCEDURE — 70553 MRI BRAIN STEM W/O & W/DYE: CPT

## 2024-09-21 PROCEDURE — 25010000002 CEFTRIAXONE PER 250 MG

## 2024-09-21 PROCEDURE — 0 GADOBENATE DIMEGLUMINE 529 MG/ML SOLUTION: Performed by: STUDENT IN AN ORGANIZED HEALTH CARE EDUCATION/TRAINING PROGRAM

## 2024-09-21 PROCEDURE — 84132 ASSAY OF SERUM POTASSIUM: CPT | Performed by: STUDENT IN AN ORGANIZED HEALTH CARE EDUCATION/TRAINING PROGRAM

## 2024-09-21 PROCEDURE — A9577 INJ MULTIHANCE: HCPCS | Performed by: STUDENT IN AN ORGANIZED HEALTH CARE EDUCATION/TRAINING PROGRAM

## 2024-09-21 RX ORDER — POTASSIUM CHLORIDE 750 MG/1
40 TABLET, FILM COATED, EXTENDED RELEASE ORAL EVERY 4 HOURS
Status: COMPLETED | OUTPATIENT
Start: 2024-09-21 | End: 2024-09-22

## 2024-09-21 RX ORDER — POTASSIUM CHLORIDE 750 MG/1
40 TABLET, FILM COATED, EXTENDED RELEASE ORAL EVERY 4 HOURS
Status: COMPLETED | OUTPATIENT
Start: 2024-09-21 | End: 2024-09-21

## 2024-09-21 RX ADMIN — POTASSIUM CHLORIDE 40 MEQ: 750 TABLET, EXTENDED RELEASE ORAL at 12:00

## 2024-09-21 RX ADMIN — CEFTRIAXONE 2000 MG: 2 INJECTION, POWDER, FOR SOLUTION INTRAMUSCULAR; INTRAVENOUS at 20:49

## 2024-09-21 RX ADMIN — INSULIN LISPRO 2 UNITS: 100 INJECTION, SOLUTION INTRAVENOUS; SUBCUTANEOUS at 17:24

## 2024-09-21 RX ADMIN — POTASSIUM CHLORIDE 40 MEQ: 750 TABLET, EXTENDED RELEASE ORAL at 20:32

## 2024-09-21 RX ADMIN — POTASSIUM CHLORIDE 40 MEQ: 750 TABLET, EXTENDED RELEASE ORAL at 09:11

## 2024-09-21 RX ADMIN — INSULIN LISPRO 2 UNITS: 100 INJECTION, SOLUTION INTRAVENOUS; SUBCUTANEOUS at 12:00

## 2024-09-21 RX ADMIN — SPIRONOLACTONE 50 MG: 50 TABLET, FILM COATED ORAL at 17:23

## 2024-09-21 RX ADMIN — GADOBENATE DIMEGLUMINE 14 ML: 529 INJECTION, SOLUTION INTRAVENOUS at 04:35

## 2024-09-21 RX ADMIN — AZITHROMYCIN DIHYDRATE 500 MG: 500 INJECTION, POWDER, LYOPHILIZED, FOR SOLUTION INTRAVENOUS at 21:39

## 2024-09-21 RX ADMIN — ENOXAPARIN SODIUM 40 MG: 100 INJECTION SUBCUTANEOUS at 09:11

## 2024-09-21 RX ADMIN — SPIRONOLACTONE 50 MG: 50 TABLET, FILM COATED ORAL at 09:11

## 2024-09-21 RX ADMIN — INSULIN LISPRO 2 UNITS: 100 INJECTION, SOLUTION INTRAVENOUS; SUBCUTANEOUS at 21:39

## 2024-09-21 RX ADMIN — INSULIN LISPRO 2 UNITS: 100 INJECTION, SOLUTION INTRAVENOUS; SUBCUTANEOUS at 09:12

## 2024-09-21 NOTE — PROGRESS NOTES
"   LOS: 2 days     Chief Complaint/ Reason for encounter: Hypertension, hyperaldosteronism    Subjective   09/21/24 : Patient is doing well today with no new complaints  Good appetite with no nausea or vomiting  No shortness of breath chest pain or edema  Voiding well with no dysuria          Medical history reviewed:  History of Present Illness    Subjective    History taken from: Patient and chart    Vital Signs  Temp:  [97.5 °F (36.4 °C)-98.4 °F (36.9 °C)] 98.4 °F (36.9 °C)  Heart Rate:  [66-77] 66  Resp:  [18] 18  BP: (166-195)/(73-90) 187/87       Wt Readings from Last 1 Encounters:   09/21/24 0632 69.6 kg (153 lb 8 oz)   09/20/24 0445 69.9 kg (154 lb 3.2 oz)   09/19/24 1944 69.9 kg (154 lb)       Objective:  Vital signs: (most recent): Blood pressure (!) 187/87, pulse 66, temperature 98.4 °F (36.9 °C), temperature source Oral, resp. rate 18, height 185.4 cm (72.99\"), weight 69.6 kg (153 lb 8 oz), SpO2 90%.                Objective:  General Appearance:  Comfortable, well-appearing, in no acute distress and not in pain.  Awake, alert  HEENT: Mucous membranes moist, no injury, oropharynx clear  Lungs:  Normal effort and normal respiratory rate.  Breath sounds clear to auscultation.  No  respiratory distress.  No rales, decreased breath sounds or rhonchi.    Heart: Normal rate.  Regular rhythm.  S1, S2 normal.  No murmur.   Abdomen: Abdomen is soft.  Bowel sounds are normal, no abdominal tenderness.  There is no rebound or guarding  Extremities: No edema of bilateral lower extremities  Skin:  Warm and dry with no rashes      Results Review:    Intake/Output:     Intake/Output Summary (Last 24 hours) at 9/21/2024 0949  Last data filed at 9/20/2024 2057  Gross per 24 hour   Intake 480 ml   Output --   Net 480 ml         DATA:  Radiology and Labs:  The following labs independently reviewed by me. Additional labs ordered for tomorrow a.m.  Interval notes, chart personally reviewed by me.   Old records independently " reviewed showing longstanding smoking history, weight loss  The following radiologic studies independently viewed by me, findings MRI brain nothing acute.  CT chest abdomen pelvis showing a primary left upper lobe malignancy with notable pulmonary hepatic and osseous metastatic disease.  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  Discussed with patient herself at bedside    Risk/ complexity of medical care/ medical decision making moderate complexity of the BP and electrolytes    Labs:   Recent Results (from the past 24 hour(s))   Basic Metabolic Panel    Collection Time: 09/20/24 10:03 AM    Specimen: Blood   Result Value Ref Range    Glucose 166 (H) 65 - 99 mg/dL    BUN 18 8 - 23 mg/dL    Creatinine 0.65 0.57 - 1.00 mg/dL    Sodium 142 136 - 145 mmol/L    Potassium 3.0 (L) 3.5 - 5.2 mmol/L    Chloride 96 (L) 98 - 107 mmol/L    CO2 37.4 (H) 22.0 - 29.0 mmol/L    Calcium 9.0 8.6 - 10.5 mg/dL    BUN/Creatinine Ratio 27.7 (H) 7.0 - 25.0    Anion Gap 8.6 5.0 - 15.0 mmol/L    eGFR 98.5 >60.0 mL/min/1.73   CBC (No Diff)    Collection Time: 09/20/24 10:03 AM    Specimen: Blood   Result Value Ref Range    WBC 13.70 (H) 3.40 - 10.80 10*3/mm3    RBC 4.36 3.77 - 5.28 10*6/mm3    Hemoglobin 12.3 12.0 - 15.9 g/dL    Hematocrit 36.8 34.0 - 46.6 %    MCV 84.4 79.0 - 97.0 fL    MCH 28.2 26.6 - 33.0 pg    MCHC 33.4 31.5 - 35.7 g/dL    RDW 13.9 12.3 - 15.4 %    RDW-SD 42.7 37.0 - 54.0 fl    MPV 9.1 6.0 - 12.0 fL    Platelets 104 (L) 140 - 450 10*3/mm3   Magnesium    Collection Time: 09/20/24 10:03 AM    Specimen: Blood   Result Value Ref Range    Magnesium 2.0 1.6 - 2.4 mg/dL   POC Glucose Once    Collection Time: 09/20/24 11:20 AM    Specimen: Blood   Result Value Ref Range    Glucose 80 70 - 130 mg/dL   POC Glucose Once    Collection Time: 09/20/24  4:05 PM    Specimen: Blood   Result Value Ref Range    Glucose 167 (H) 70 - 130 mg/dL   POC Glucose Once    Collection  Time: 09/20/24  8:24 PM    Specimen: Blood   Result Value Ref Range    Glucose 175 (H) 70 - 130 mg/dL   Comprehensive Metabolic Panel    Collection Time: 09/21/24  3:51 AM    Specimen: Blood   Result Value Ref Range    Glucose 142 (H) 65 - 99 mg/dL    BUN 11 8 - 23 mg/dL    Creatinine 0.50 (L) 0.57 - 1.00 mg/dL    Sodium 141 136 - 145 mmol/L    Potassium 3.5 3.5 - 5.2 mmol/L    Chloride 99 98 - 107 mmol/L    CO2 34.3 (H) 22.0 - 29.0 mmol/L    Calcium 8.9 8.6 - 10.5 mg/dL    Total Protein 6.2 6.0 - 8.5 g/dL    Albumin 3.6 3.5 - 5.2 g/dL    ALT (SGPT) 44 (H) 1 - 33 U/L    AST (SGOT) 32 1 - 32 U/L    Alkaline Phosphatase 121 (H) 39 - 117 U/L    Total Bilirubin 0.6 0.0 - 1.2 mg/dL    Globulin 2.6 gm/dL    A/G Ratio 1.4 g/dL    BUN/Creatinine Ratio 22.0 7.0 - 25.0    Anion Gap 7.7 5.0 - 15.0 mmol/L    eGFR 104.9 >60.0 mL/min/1.73   CBC Auto Differential    Collection Time: 09/21/24  3:51 AM    Specimen: Blood   Result Value Ref Range    WBC 14.19 (H) 3.40 - 10.80 10*3/mm3    RBC 4.44 3.77 - 5.28 10*6/mm3    Hemoglobin 12.6 12.0 - 15.9 g/dL    Hematocrit 37.6 34.0 - 46.6 %    MCV 84.7 79.0 - 97.0 fL    MCH 28.4 26.6 - 33.0 pg    MCHC 33.5 31.5 - 35.7 g/dL    RDW 14.1 12.3 - 15.4 %    RDW-SD 43.5 37.0 - 54.0 fl    MPV 8.9 6.0 - 12.0 fL    Platelets 106 (L) 140 - 450 10*3/mm3    Neutrophil % 81.3 (H) 42.7 - 76.0 %    Lymphocyte % 8.7 (L) 19.6 - 45.3 %    Monocyte % 5.3 5.0 - 12.0 %    Eosinophil % 0.1 (L) 0.3 - 6.2 %    Basophil % 0.2 0.0 - 1.5 %    Immature Grans % 4.4 (H) 0.0 - 0.5 %    Neutrophils, Absolute 11.55 (H) 1.70 - 7.00 10*3/mm3    Lymphocytes, Absolute 1.23 0.70 - 3.10 10*3/mm3    Monocytes, Absolute 0.75 0.10 - 0.90 10*3/mm3    Eosinophils, Absolute 0.01 0.00 - 0.40 10*3/mm3    Basophils, Absolute 0.03 0.00 - 0.20 10*3/mm3    Immature Grans, Absolute 0.62 (H) 0.00 - 0.05 10*3/mm3    nRBC 0.0 0.0 - 0.2 /100 WBC   POC Glucose Once    Collection Time: 09/21/24  6:08 AM    Specimen: Blood   Result Value Ref Range     Glucose 164 (H) 70 - 130 mg/dL       Radiology:  Pertinent radiology studies were reviewed as described above      Medications have been reviewed separately in chart overview      ASSESSMENT:  Hypertension, new diagnosis, likely secondary to hyperaldosteronism    Hypokalemia    Leukocytosis    Thrombocytopenia    CAP (community acquired pneumonia)    Type 2 diabetes mellitus with hyperglycemia, without long-term current use of insulin    Pulmonary nodules  Probable metastatic lung cancer, needs pathology confirmation    Elevated blood pressure reading without diagnosis of hypertension           DISCUSSION/PLAN:   Her blood pressure still elevated this morning but she is only received 1 dose of spironolactone  Continue spironolactone 50 mg twice daily  Give additional potassium replacement today  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

## 2024-09-21 NOTE — PROGRESS NOTES
Subjective     HISTORY OF PRESENT ILLNESS:   No acute issues overnight.  Patient sitting comfortably in bed.  Blood pressure remains elevated.    Past Medical History, Past Surgical History, Social History, Family History have been reviewed and are without significant changes except as mentioned.    Review of Systems   A comprehensive 14 point review of systems was performed and was negative except as mentioned.    Medications:  The current medication list was reviewed in the EMR    ALLERGIES:  No Known Allergies    Objective      Vitals:    09/20/24 2308 09/21/24 0338 09/21/24 0632 09/21/24 0745   BP: (!) 195/90  Comment: nurse notified (!) 188/84  Comment: nurse notified  (!) 187/87  Comment: nurse notified   BP Location: Right arm Right arm  Right arm   Patient Position: Lying Lying  Lying   Pulse: 75 72  66   Resp: 18 18  18   Temp: 97.7 °F (36.5 °C) 97.9 °F (36.6 °C)  98.4 °F (36.9 °C)   TempSrc: Oral Oral  Oral   SpO2: 92% 90%  90%   Weight:   69.6 kg (153 lb 8 oz)    Height:              No data to display                Physical Exam    CONSTITUTIONAL:  Vital signs reviewed.  No distress, looks comfortable.  EYES:  Conjunctiva and lids unremarkable.    EARS,NOSE,MOUTH,THROAT:  Ears and nose appear unremarkable.    RESPIRATORY:  Normal respiratory effort.    CARDIOVASCULAR:  Normal heart rate  GASTROINTESTINAL: Abdomen appears unremarkable.  Nondistended   LYMPHATIC:  No cervical, supraclavicular lymphadenopathy.  SKIN:  Warm.  No rashes.  PSYCHIATRIC:  Normal judgment and insight.  Normal mood and affect.  NEURO: AAOx3, no obvious focal deficits.      RECENT LABS:  Hematology WBC   Date Value Ref Range Status   09/21/2024 14.19 (H) 3.40 - 10.80 10*3/mm3 Final   09/19/2024 19.98 (H) 4.5 - 11.0 10*3/uL Final     RBC   Date Value Ref Range Status   09/21/2024 4.44 3.77 - 5.28 10*6/mm3 Final   09/19/2024 5.20 4.0 - 5.2 10*6/uL Final     Hemoglobin   Date Value Ref Range Status   09/21/2024 12.6 12.0 - 15.9  g/dL Final   09/19/2024 14.6 12.0 - 16.0 g/dL Final     Hematocrit   Date Value Ref Range Status   09/21/2024 37.6 34.0 - 46.6 % Final   09/19/2024 43.7 36.0 - 46.0 % Final     Platelets   Date Value Ref Range Status   09/21/2024 106 (L) 140 - 450 10*3/mm3 Final   09/19/2024 129 (L) 140 - 440 10*3/uL Final              Assessment & Plan   Patient is a pleasant 64-year-old female with medical history significant for COPD and history of SIADH/hyponatremia admitted with hypokalemia and weight loss.  Hematology/oncology consulted for suspected lung malignancy.       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    Has > 40-pack-year smoking history  The CT C/A/P demonstrated left perihilar mass, 3.6 x 2.1 cm with significant left hilar and mediastinal lymphadenopathy, prevascular doreen conglomerate measuring 4.3 x 2.2 cm.  Left hilar lymphadenopathy causes severe narrowing of the IRIS proximal segmental bronchi.  Numerous (greater than 20) hypoenhancing bilobar hepatic lesions, largest 2.4 cm in hepatic segment 4A.  Normal bilateral adrenal glands.  No abdominal or pelvic lymphadenopathy.  MRI brain negative for metastasis  9/20/2024: I reviewed the imaging and laboratory findings with patient in detail.  Informed her that they are highly concerning for primary lung malignancy.  Plan made to consult pulmonology for bronchoscopy and biopsy of the hilar/mediastinal lymph nodes.  9/21: Seen by pulmonology.  Plan for bronchoscopy and biopsy on Monday, 9/23.  I again reviewed her imaging findings and tried calling her daughter to go over the management plan.  Call went unanswered.  Informed patient that we can try calling her again tomorrow.  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  Nephrology following     # ?  Obstructive pneumonia: On antibiotics per primary     # Mild thrombocytopenia: Monitor     Recommendations:  -Pulmonology planning bronchoscopy and biopsy  -Spironolactone per nephrology  -Will continue to follow    I spent 52 minutes on this encounter, before, during & after the visit evaluating the patient, reviewing records and writing orders.

## 2024-09-21 NOTE — PLAN OF CARE
"Goal Outcome Evaluation:  Plan of Care Reviewed With: patient        Progress: no change  Outcome Evaluation:     A&O x4. SR on telemetry. Room air.     BP elevated. SBP in the 180's-190's.   Asymptomatic.   Pt refused Aldactone early in shift but agreed (after education) to take it after \"sleeping\" for a couple of hours. She finally took her first dose of this around 11pm.       K replaced.   Timed recheck ordered for 0330.     IV abx given.       Accuchecks.   Daily weights.         Plans for MRI of the brain today.         Will continue to monitor.                               "

## 2024-09-21 NOTE — PLAN OF CARE
Goal Outcome Evaluation:  Plan of Care Reviewed With: patient        Progress: improving     Pt is a 64 y.o. female admitted to [facility] after receiving abnormal labs from her PCP on 9/19/2024. Patient reports residing in 2-story home with all needs on main floor and no YOANA. She lives with her disabled  (she is primary caregiver), denies use of AD and IADLs. Pt demo ability to perform bed mobility, STS and ambulates 150' without AD and demo safe gait technique. Patient reports she is at her functional baseline and denies need for skilled PT. Will sign off. Recommend D/c home.      Anticipated Discharge Disposition (PT): home

## 2024-09-21 NOTE — THERAPY EVALUATION
Patient Name: Kranthi Farley  : 1960    MRN: [id number]                              Today's Date: 2024       Admit Date: 2024    Visit Dx:     ICD-10-CM ICD-9-CM   1. Hypokalemia  E87.6 276.8   2. Pulmonary nodules  R91.8 793.19     Patient Active Problem List   Diagnosis    Vulvar dysplasia    Hypokalemia    Leukocytosis    Thrombocytopenia    CAP (community acquired pneumonia)    Type 2 diabetes mellitus with hyperglycemia, without long-term current use of insulin    Pulmonary nodules    Abnormal CT of the chest    Elevated blood pressure reading without diagnosis of hypertension     Past Medical History:   Diagnosis Date    Cancer     SKIN REMOVED    Cholesteatoma     BILATERAL EARS    PONV (postoperative nausea and vomiting)      Past Surgical History:   Procedure Laterality Date    BLADDER REPAIR       SECTION      INNER EAR SURGERY Bilateral     CHOLESTEOMA    VULVA BIOPSY Right 2021    Procedure: WIDE LOCAL EXCISION OF VULVA LESION;  Surgeon: Dwayne Sood MD;  Location[place] [facility] [place] [facility] [place]  Service: Gynecology;  Laterality: Right;      General Information       Row Name 24 1255          Physical Therapy Time and Intention    Document Type evaluation;discharge evaluation/summary  -JS     Mode of Treatment individual therapy;physical therapy  -JS       Row Name 24 1255          General Information    Patient Profile Reviewed yes  -JS     Prior Level of Function independent:;all household mobility;community mobility;gait;transfer;bed mobility;ADL's  -JS     Existing Precautions/Restrictions no known precautions/restrictions  -JS     Barriers to Rehab none identified  -JS       Row Name 24 1255          Living Environment    People in Home spouse  -JS     Name(s) of People in Home Melquiades  -       Row Name 24 1255          Home Main Entrance    Number of Stairs, Main Entrance none  -JS       Row Name 24 1255          Stairs Within Home, Primary     Number of Stairs, Within Home, Primary ten  -JS     Stairs Comment, Within Home, Primary two story home with all needs on main floor  -JS       Row Name 09/21/24 1255          Cognition    Orientation Status (Cognition) oriented x 4  -JS       Row Name 09/21/24 1255          Safety Issues, Functional Mobility    Comment, Safety Issues/Impairments (Mobility) pt demo baseline balance, strength and endurance.  -JS               User Key  (r) = Recorded By, (t) = Taken By, (c) = Cosigned By      Initials Name Provider Type    Alexandra Martines PT Physical Therapist                   Mobility       Row Name 09/21/24 1256          Bed Mobility    Bed Mobility supine-sit;sit-supine  -JS     Supine-Sit [place] (Bed Mobility) independent  -JS     Sit-Supine [place] (Bed Mobility) independent  -JS     Comment, (Bed Mobility) pt perform without use of bed rails and HOB not elevated  -JS       Row Name 09/21/24 1256          Bed-Chair Transfer    Bed-Chair [place] (Transfers) independent  -JS     Comment, (Bed-Chair Transfer) transferred to chair to don tennis shoes prior to ambulating  -JS       Row Name 09/21/24 1256          Sit-Stand Transfer    Sit-Stand [place] (Transfers) independent  -JS     Comment, (Sit-Stand Transfer) able to stand without use of BUE  -JS       Row Name 09/21/24 1256          Gait/Stairs (Locomotion)    [place] Level (Gait) standby assist  -JS     Distance in Feet (Gait) 150  -JS     Deviations/Abnormal Patterns (Gait) gait speed decreased  -JS     Comment, (Gait/Stairs) pt demo ability to ambulate around nursing unit without unsteadiness or significant gait deviations. did not assess stairs due to no YOANA and all needs on main floor  -JS               User Key  (r) = Recorded By, (t) = Taken By, (c) = Cosigned By      Initials Name Provider Type    Alexandra Martines PT Physical Therapist                   Obj/Interventions       Row Name 09/21/24 1250           Range of Motion Comprehensive    General Range of Motion no range of motion deficits identified  -       Row Name 09/21/24 1258          Strength Comprehensive (MMT)    General Manual Muscle Testing (MMT) Assessment no strength deficits identified  -JS     Comment, General Manual Muscle Testing (MMT) Assessment patient demo baseline strength  -       Row Name 09/21/24 1258          Balance    Comment, Balance pt demo safe static/dynamic sitting/standing balance  -               User Key  (r) = Recorded By, (t) = Taken By, (c) = Cosigned By      Initials Name Provider Type    Alexandra Martines, PT Physical Therapist                   Goals/Plan    No documentation.                  Clinical Impression       [facility] Name 09/21/24 1259          Pain    Pretreatment Pain Rating 0/10 - no pain  -     Posttreatment Pain Rating 0/10 - no pain  -JS       Row Name 09/21/24 1259          Plan of Care Review    Plan of Care Reviewed With patient  -JS     Progress improving  -JS       Row Name 09/21/24 1259          Therapy Assessment/Plan (PT)    Criteria for Skilled Interventions Met (PT) no problems identified which require skilled intervention  -JS       Row Name 09/21/24 1259          Vital Signs    O2 Delivery Pre Treatment room air  -JS     O2 Delivery Intra Treatment room air  -JS     O2 Delivery Post Treatment room air  -JS     Pre Patient Position Supine  -JS     Intra Patient Position Standing  -JS     Post Patient Position Sitting  -[facility] Name 09/21/24 1259          Positioning and Restraints    Pre-Treatment Position in bed  -JS     Post Treatment Position chair  EOB  -JS     In Chair sitting;call light within reach;encouraged to call for assist  -               User Key  (r) = Recorded By, (t) = Taken By, (c) = Cosigned By      Initials Name Provider Type    Alexandra Martines, PT Physical Therapist                   Outcome Measures       Row Name 09/21/24 1259 09/21/24 0912       How much help  from another person do you currently need...    Turning from your back to your side while in flat bed without using bedrails? 4  -JS 4  -BB    Moving from lying on back to sitting on the side of a flat bed without bedrails? 4  -JS 4  -BB    Moving to and from a bed to a chair (including a wheelchair)? 4  -JS 4  -BB    Standing up from a chair using your arms (e.g., wheelchair, bedside chair)? 4  -JS 4  -BB    Climbing 3-5 steps with a railing? 4  -JS 4  -BB    To walk in hospital room? 4  -JS 4  -BB    AM-PAC 6 Clicks Score (PT) 24  -JS 24  -BB    Highest Level of Mobility Goal 8 --> Walked 250 feet or more  -JS 8 --> Walked 250 feet or more  -BB      Row Name 09/21/24 1259          Functional Assessment    Outcome Measure Options AM-PAC 6 Clicks Basic Mobility (PT)  -JS               User Key  (r) = Recorded By, (t) = Taken By, (c) = Cosigned By      Initials Name Provider Type    Alexandra Martines PT Physical Therapist    Eunice Paul, RN Registered Nurse                                   PT Recommendation and Plan     Plan of Care Reviewed With: patient  Progress: improving     Time Calculation:         PT Charges       Row Name 09/21/24 1303             Time Calculation    Start Time 1130  -JS      Stop Time 1155  -JS      Time Calculation (min) 25 min  -JS      PT Received On 09/21/24  -JS                User Key  (r) = Recorded By, (t) = Taken By, (c) = Cosigned By      Initials Name Provider Type    Alexandra Martines PT Physical Therapist                  Therapy Charges for Today       Code Description Service Date Service Provider Modifiers Qty    98542904280  PT THERAPEUTIC ACT EA 15 MIN 9/21/2024 Alexandra Amezcua PT GP 2    34386963761 HC PT EVAL MOD COMPLEXITY 3 9/21/2024 Alexandra Amezcua, PT GP 1            PT G-Codes  Outcome Measure Options: AM-PAC 6 Clicks Basic Mobility (PT)  AM-PAC 6 Clicks Score (PT): 24  PT Discharge Summary  Anticipated Discharge Disposition (PT):  home    Alexandra Amezcua, PT  9/21/2024

## 2024-09-21 NOTE — PROGRESS NOTES
Name: Kranthi Farley ADMIT: 2024   : 1960  PCP: Alexandra Keller PA-C    MRN: [id number] LOS: 2 days   AGE/SEX: 64 y.o. female  ROOM: [facility]     Subjective   Subjective   Patient awake and resting in bed, having some dyspnea, some fatigue, otherwise, no acute complaints.       Objective   Objective   Vital Signs  Temp:  [97.5 °F (36.4 °C)-98.4 °F (36.9 °C)] 98.4 °F (36.9 °C)  Heart Rate:  [66-77] 66  Resp:  [18] 18  BP: (166-195)/(73-90) 187/87  SpO2:  [90 %-93 %] 90 %  on   ;   Device (Oxygen Therapy): room air  Body mass index is 20.26 kg/m².  Physical Exam  Vitals and nursing note reviewed.   Constitutional:       General: She is not in acute distress.     Appearance: She is ill-appearing.   Cardiovascular:      Rate and Rhythm: Normal rate and regular rhythm.      Pulses: Normal pulses.      Heart sounds: Normal heart sounds.   Pulmonary:      Effort: Pulmonary effort is normal. No respiratory distress.      Breath sounds: Decreased breath sounds present. No wheezing.   Abdominal:      General: Bowel sounds are normal.      Palpations: Abdomen is soft.      Tenderness: There is no abdominal tenderness.   Musculoskeletal:      Right lower leg: No edema.      Left lower leg: No edema.   Skin:     General: Skin is warm and dry.   Neurological:      Mental Status: She is alert.       Results Review     I reviewed the patient's new clinical results.  Results from last 7 days   Lab Units 24  03524  1003 24   WBC 10*3/mm3 14.19* 13.70* 17.19* 19.98*   HEMOGLOBIN g/dL 12.6 12.3 14.2 14.6   PLATELETS 10*3/mm3 106* 104* 109* 129*     Results from last 7 days   Lab Units 24  03524  1003 24   SODIUM mmol/L 141 142 142   POTASSIUM mmol/L 3.5 3.0* 2.3*   CHLORIDE mmol/L 99 96* 92*   CO2 mmol/L 34.3* 37.4* 39.1*   BUN mg/dL 11 18 19   CREATININE mg/dL 0.50* 0.65 0.67   GLUCOSE mg/dL 142* 166* 190*   EGFR mL/min/1.73 104.9 98.5 97.7      Results from last 7 days   Lab Units 09/21/24  0351 09/19/24 2006   ALBUMIN g/dL 3.6 3.9   BILIRUBIN mg/dL 0.6 1.3*   ALK PHOS U/L 121* 143*   AST (SGOT) U/L 32 31   ALT (SGPT) U/L 44* 46*     Results from last 7 days   Lab Units 09/21/24  0351 09/20/24  1003 09/19/24 2006   CALCIUM mg/dL 8.9 9.0 9.6   ALBUMIN g/dL 3.6  --  3.9   MAGNESIUM mg/dL  --  2.0 2.0     Results from last 7 days   Lab Units 09/20/24  0001 09/19/24 2006   PROCALCITONIN ng/mL  --  1.75*   LACTATE mmol/L 1.2  --      Hemoglobin A1C   Date/Time Value Ref Range Status   09/19/2024 0925 7.5 (H) 4.3 - 5.6 % Final     Glucose   Date/Time Value Ref Range Status   09/21/2024 1004 168 (H) 70 - 130 mg/dL Final   09/21/2024 0608 164 (H) 70 - 130 mg/dL Final   09/20/2024 2024 175 (H) 70 - 130 mg/dL Final   09/20/2024 1605 167 (H) 70 - 130 mg/dL Final   09/20/2024 1120 80 70 - 130 mg/dL Final   09/20/2024 0529 165 (H) 70 - 130 mg/dL Final       MRI Brain With & Without Contrast    Result Date: 9/21/2024  1. No acute intracranial abnormality.   No abnormal enhancement.   This report was finalized on 9/21/2024 5:25 AM by Dr. Felicitas Cosme M.D on Workstation: [id number]      CT Chest With Contrast Diagnostic    Result Date: 9/20/2024  1. 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 Recommend tissue sampling and oncologic consultation. 2. Left upper lobe perihilar mass/left hilar lymphadenopathy causes moderate narrowing of the mid left main pulmonary artery and left upper lobe segmental pulmonary arteries which remain patent where seen. Lymphadenopathy also causes severe narrowing/stenosis of the left upper lobe proximal segmental bronchi. Left upper lobe remains aerated.   This report was finalized on 9/20/2024 12:37 PM by Dr. Jose Wood M.D on Workstation: [id number]      CT Abdomen Pelvis With Contrast    Result Date:  9/20/2024  1. 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 Recommend tissue sampling and oncologic consultation. 2. Left upper lobe perihilar mass/left hilar lymphadenopathy causes moderate narrowing of the mid left main pulmonary artery and left upper lobe segmental pulmonary arteries which remain patent where seen. Lymphadenopathy also causes severe narrowing/stenosis of the left upper lobe proximal segmental bronchi. Left upper lobe remains aerated.   This report was finalized on 9/20/2024 12:37 PM by Dr. Jose Wood M.D on Workstation: [id number]       I have personally reviewed all medications:  Scheduled Medications  azithromycin, 500 mg, Intravenous, Q24H  budesonide-formoterol, 2 puff, Inhalation, BID - RT  cefTRIAXone, 2,000 mg, Intravenous, Q24H  enoxaparin, 40 mg, Subcutaneous, Q24H  insulin lispro, 2-7 Units, Subcutaneous, 4x Daily AC & at Bedtime  spironolactone, 50 mg, Oral, BID  tiotropium bromide monohydrate, 2 puff, Inhalation, Daily - RT    Infusions  Pharmacy Consult,     Diet  Diet: Cardiac, Diabetic; Healthy Heart (2-3 Na+); Consistent Carbohydrate; Fluid Consistency: Thin (IDDSI 0)    I have personally reviewed:  [x]  Laboratory   [x]  Microbiology   [x]  Radiology   [x]  EKG/Telemetry  []  Cardiology/Vascular   []  Pathology    []  Records       Assessment/Plan     Active Hospital Problems    Diagnosis  POA    **Hypokalemia [E87.6]  Yes    Pulmonary nodules [R91.8]  Yes    Abnormal CT of the chest [R93.89]  Yes    Elevated blood pressure reading without diagnosis of hypertension [R03.0]  Yes    Leukocytosis [D72.829]  Yes    Thrombocytopenia [D69.6]  Yes    CAP (community acquired pneumonia) [J18.9]  Yes    Type 2 diabetes mellitus with hyperglycemia, without long-term current use of insulin [E11.65]  Yes      Resolved Hospital Problems   No resolved problems to  display.       64 y.o. female admitted with Hypokalemia.    Pulmonary nodules  Perihilar mass  Lymphadenopathy  Abnormal weight loss, tobacco use  - Patient with weight loss over past several months, coupled with ongoing tobacco use. CT Chest obtained, results have returned (09/20), impression suggesting primary left upper lobe malignancy with doreen (left hilar, mediastinal and left cervical), pulmonary, hepatic and osseous metastatic disease. Also noting multiple pulmonary nodules, and as above mass/adenopathy, tissue sampling recommended,  - Oncology and Pulmonology consulted, notes reviewed, plans for bronchoscopy likely Monday 09/23/24. Will continue to follow their plans/recommendations. Greatly appreciate their help.    Pneumonia  Leukocytosis  - Findings of pneumonia noted on imaging, coupled with leukocytosis, elevated procalcitonin. UA is also abnormal, but patient without urinary complaints.  RVP/MRSA/strep/legionella negative, blood cultures no growth thus far. WBC remains elevated but overall improved.  - Continue empiric antibiotic therapy as prescribed for now.    Elevated blood pressure without prior diagnosis of hypertension, newly diagnosed hypertension  Hypokalemia  Metabolic alkalosis  - Elevated BP noted, patient not on antihypertensive medications previously, multiple elevations noted, will classify as newly diagnosed hypertension. Potassium low, metabolic alkalosis noted, concern for possible hyperaldosteronism.  - Aldosterone/renin pending. Nephrology consulted and following, Discussed with Dr. Castle, given that she has newly diagnosed hypertension, coupled with hypokalemia and metabolic alkalosis, strong suspicion for hyperaldosteronism. She has been started on Aldactone. Continue as prescribed.   - Potassium repleted, values improved on most recent testing. Will continue to follow Nephrology plans/recommendations. Greatly appreciate their help.    Type 2 diabetes mellitus with  hyperglycemia  - Most recent A1c: 7.50%  - Current treatment regimen: SSI; Glucose acceptable, continue SSI as prescribed.  - Will monitor 24 hour insulin requirements and adjust as needed to achieve target inpatient range of 140-180.  - Diabetic diet    Thrombocytopenia  - Platelets found to be low on most recent labs. No indications for urgent intervention at present. Will monitor for now.  - Order repeat CBC in AM for reassessment.    COPD  - No evidence to suggest acute exacerbation.  Continue current medications as prescribed and closely monitor.  - Supplemental oxygen as needed to maintain O2 sats 88 to 92%, pulse oximetry, telemetry monitoring.    Transaminitis  - Noted on labs, abdominal exam benign, CT showing abnormal findings involving liver, monitor for now with CMP.    SCDs for DVT prophylaxis.  Full code.  Discussed with patient, nursing staff, and consulting provider.  Anticipate discharge home timing yet to be determined.  Expected Discharge Date: 9/23/2024; Expected Discharge Time:       Royer Prajapati DO  [facility]  09/21/24

## 2024-09-22 PROBLEM — I10 ESSENTIAL HYPERTENSION: Status: ACTIVE | Noted: 2024-09-22

## 2024-09-22 PROBLEM — R74.01 TRANSAMINITIS: Status: ACTIVE | Noted: 2024-09-22

## 2024-09-22 LAB
ALBUMIN SERPL-MCNC: 3.8 G/DL (ref 3.5–5.2)
ALBUMIN/GLOB SERPL: 1.5 G/DL
ALP SERPL-CCNC: 122 U/L (ref 39–117)
ALT SERPL W P-5'-P-CCNC: 45 U/L (ref 1–33)
ANION GAP SERPL CALCULATED.3IONS-SCNC: 9.8 MMOL/L (ref 5–15)
AST SERPL-CCNC: 37 U/L (ref 1–32)
BASOPHILS # BLD AUTO: 0.03 10*3/MM3 (ref 0–0.2)
BASOPHILS NFR BLD AUTO: 0.2 % (ref 0–1.5)
BILIRUB SERPL-MCNC: 0.6 MG/DL (ref 0–1.2)
BUN SERPL-MCNC: 11 MG/DL (ref 8–23)
BUN/CREAT SERPL: 20 (ref 7–25)
CALCIUM SPEC-SCNC: 9.2 MG/DL (ref 8.6–10.5)
CHLORIDE SERPL-SCNC: 95 MMOL/L (ref 98–107)
CO2 SERPL-SCNC: 31.2 MMOL/L (ref 22–29)
CREAT SERPL-MCNC: 0.55 MG/DL (ref 0.57–1)
DEPRECATED RDW RBC AUTO: 44.4 FL (ref 37–54)
EGFRCR SERPLBLD CKD-EPI 2021: 102.5 ML/MIN/1.73
EOSINOPHIL # BLD AUTO: 0.01 10*3/MM3 (ref 0–0.4)
EOSINOPHIL NFR BLD AUTO: 0.1 % (ref 0.3–6.2)
ERYTHROCYTE [DISTWIDTH] IN BLOOD BY AUTOMATED COUNT: 14.3 % (ref 12.3–15.4)
GLOBULIN UR ELPH-MCNC: 2.6 GM/DL
GLUCOSE BLDC GLUCOMTR-MCNC: 129 MG/DL (ref 70–130)
GLUCOSE BLDC GLUCOMTR-MCNC: 143 MG/DL (ref 70–130)
GLUCOSE BLDC GLUCOMTR-MCNC: 204 MG/DL (ref 70–130)
GLUCOSE BLDC GLUCOMTR-MCNC: 270 MG/DL (ref 70–130)
GLUCOSE SERPL-MCNC: 153 MG/DL (ref 65–99)
HCT VFR BLD AUTO: 39.4 % (ref 34–46.6)
HGB BLD-MCNC: 13 G/DL (ref 12–15.9)
IMM GRANULOCYTES # BLD AUTO: 0.63 10*3/MM3 (ref 0–0.05)
IMM GRANULOCYTES NFR BLD AUTO: 5 % (ref 0–0.5)
LYMPHOCYTES # BLD AUTO: 1.26 10*3/MM3 (ref 0.7–3.1)
LYMPHOCYTES NFR BLD AUTO: 10.1 % (ref 19.6–45.3)
MCH RBC QN AUTO: 28.1 PG (ref 26.6–33)
MCHC RBC AUTO-ENTMCNC: 33 G/DL (ref 31.5–35.7)
MCV RBC AUTO: 85.1 FL (ref 79–97)
MONOCYTES # BLD AUTO: 0.63 10*3/MM3 (ref 0.1–0.9)
MONOCYTES NFR BLD AUTO: 5 % (ref 5–12)
NEUTROPHILS NFR BLD AUTO: 79.6 % (ref 42.7–76)
NEUTROPHILS NFR BLD AUTO: 9.96 10*3/MM3 (ref 1.7–7)
NRBC BLD AUTO-RTO: 0 /100 WBC (ref 0–0.2)
PLATELET # BLD AUTO: 115 10*3/MM3 (ref 140–450)
PMV BLD AUTO: 9.2 FL (ref 6–12)
POTASSIUM SERPL-SCNC: 3.9 MMOL/L (ref 3.5–5.2)
PROT SERPL-MCNC: 6.4 G/DL (ref 6–8.5)
RBC # BLD AUTO: 4.63 10*6/MM3 (ref 3.77–5.28)
SODIUM SERPL-SCNC: 136 MMOL/L (ref 136–145)
WBC NRBC COR # BLD AUTO: 12.52 10*3/MM3 (ref 3.4–10.8)

## 2024-09-22 PROCEDURE — 80053 COMPREHEN METABOLIC PANEL: CPT | Performed by: STUDENT IN AN ORGANIZED HEALTH CARE EDUCATION/TRAINING PROGRAM

## 2024-09-22 PROCEDURE — 82948 REAGENT STRIP/BLOOD GLUCOSE: CPT

## 2024-09-22 PROCEDURE — 25010000002 ENOXAPARIN PER 10 MG: Performed by: STUDENT IN AN ORGANIZED HEALTH CARE EDUCATION/TRAINING PROGRAM

## 2024-09-22 PROCEDURE — 99233 SBSQ HOSP IP/OBS HIGH 50: CPT | Performed by: INTERNAL MEDICINE

## 2024-09-22 PROCEDURE — 63710000001 INSULIN GLARGINE PER 5 UNITS: Performed by: STUDENT IN AN ORGANIZED HEALTH CARE EDUCATION/TRAINING PROGRAM

## 2024-09-22 PROCEDURE — 25010000002 CEFTRIAXONE PER 250 MG

## 2024-09-22 PROCEDURE — 63710000001 INSULIN LISPRO (HUMAN) PER 5 UNITS

## 2024-09-22 PROCEDURE — 85025 COMPLETE CBC W/AUTO DIFF WBC: CPT | Performed by: STUDENT IN AN ORGANIZED HEALTH CARE EDUCATION/TRAINING PROGRAM

## 2024-09-22 RX ORDER — HYDRALAZINE HYDROCHLORIDE 25 MG/1
25 TABLET, FILM COATED ORAL EVERY 8 HOURS SCHEDULED
Status: DISCONTINUED | OUTPATIENT
Start: 2024-09-22 | End: 2024-09-23

## 2024-09-22 RX ORDER — SPIRONOLACTONE 100 MG/1
100 TABLET, FILM COATED ORAL
Status: DISCONTINUED | OUTPATIENT
Start: 2024-09-22 | End: 2024-09-25

## 2024-09-22 RX ADMIN — INSULIN GLARGINE 2 UNITS: 100 INJECTION, SOLUTION SUBCUTANEOUS at 21:49

## 2024-09-22 RX ADMIN — INSULIN LISPRO 4 UNITS: 100 INJECTION, SOLUTION INTRAVENOUS; SUBCUTANEOUS at 21:47

## 2024-09-22 RX ADMIN — HYDRALAZINE HYDROCHLORIDE 25 MG: 25 TABLET ORAL at 13:08

## 2024-09-22 RX ADMIN — POTASSIUM CHLORIDE 40 MEQ: 750 TABLET, EXTENDED RELEASE ORAL at 00:14

## 2024-09-22 RX ADMIN — ENOXAPARIN SODIUM 40 MG: 100 INJECTION SUBCUTANEOUS at 09:32

## 2024-09-22 RX ADMIN — SPIRONOLACTONE 100 MG: 100 TABLET ORAL at 17:15

## 2024-09-22 RX ADMIN — HYDRALAZINE HYDROCHLORIDE 25 MG: 25 TABLET ORAL at 21:46

## 2024-09-22 RX ADMIN — SPIRONOLACTONE 100 MG: 100 TABLET ORAL at 09:32

## 2024-09-22 RX ADMIN — CEFTRIAXONE 2000 MG: 2 INJECTION, POWDER, FOR SOLUTION INTRAMUSCULAR; INTRAVENOUS at 20:49

## 2024-09-22 RX ADMIN — INSULIN LISPRO 3 UNITS: 100 INJECTION, SOLUTION INTRAVENOUS; SUBCUTANEOUS at 13:09

## 2024-09-22 NOTE — PROGRESS NOTES
"   LOS: 3 days     Chief Complaint/ Reason for encounter: Hypertension, hyperaldosteronism    Subjective   09/21/24 : Patient is doing well today with no new complaints  Good appetite with no nausea or vomiting  No shortness of breath chest pain or edema  Voiding well with no dysuria    9/22: No new complaints or events, BP remains elevated but she is asymptomatic    Medical history reviewed:  History of Present Illness    Subjective    History taken from: Patient and chart    Vital Signs  Temp:  [97.9 °F (36.6 °C)-98.4 °F (36.9 °C)] 98.4 °F (36.9 °C)  Heart Rate:  [58-92] 84  Resp:  [18] 18  BP: (158-193)/() 193/94       Wt Readings from Last 1 Encounters:   09/22/24 0625 68.9 kg (151 lb 14.4 oz)   09/21/24 0632 69.6 kg (153 lb 8 oz)   09/20/24 0445 69.9 kg (154 lb 3.2 oz)   09/19/24 1944 69.9 kg (154 lb)       Objective:  Vital signs: (most recent): Blood pressure (!) 193/94, pulse 84, temperature 98.4 °F (36.9 °C), temperature source Oral, resp. rate 18, height 185.4 cm (72.99\"), weight 68.9 kg (151 lb 14.4 oz), SpO2 93%.                Objective:  General Appearance:  Comfortable, well-appearing, in no acute distress and not in pain.  Awake, alert  HEENT: Mucous membranes moist, no injury, oropharynx clear  Lungs:  Normal effort and normal respiratory rate.  Breath sounds clear to auscultation.  No  respiratory distress.  No rales, decreased breath sounds or rhonchi.    Heart: Normal rate.  Regular rhythm.  S1, S2 normal.  No murmur.   Abdomen: Abdomen is soft.  Bowel sounds are normal, no abdominal tenderness.  There is no rebound or guarding  Extremities: No edema of bilateral lower extremities  Skin:  Warm and dry with no rashes      Results Review:    Intake/Output:   No intake or output data in the 24 hours ending 09/22/24 1115        DATA:  Radiology and Labs:  The following labs independently reviewed by me. Additional labs ordered for tomorrow a.m.  Interval notes, chart personally reviewed by me. "   Old records independently reviewed showing longstanding smoking history, weight loss  Discussed with patient herself at bedside    Risk/ complexity of medical care/ medical decision making moderate complexity of the BP and electrolytes    Labs:   Recent Results (from the past 24 hour(s))   POC Glucose Once    Collection Time: 09/21/24  4:23 PM    Specimen: Blood   Result Value Ref Range    Glucose 160 (H) 70 - 130 mg/dL   Potassium    Collection Time: 09/21/24  5:05 PM    Specimen: Blood   Result Value Ref Range    Potassium 3.5 3.5 - 5.2 mmol/L   POC Glucose Once    Collection Time: 09/21/24  9:05 PM    Specimen: Blood   Result Value Ref Range    Glucose 187 (H) 70 - 130 mg/dL   Comprehensive Metabolic Panel    Collection Time: 09/22/24  2:58 AM    Specimen: Blood   Result Value Ref Range    Glucose 153 (H) 65 - 99 mg/dL    BUN 11 8 - 23 mg/dL    Creatinine 0.55 (L) 0.57 - 1.00 mg/dL    Sodium 136 136 - 145 mmol/L    Potassium 3.9 3.5 - 5.2 mmol/L    Chloride 95 (L) 98 - 107 mmol/L    CO2 31.2 (H) 22.0 - 29.0 mmol/L    Calcium 9.2 8.6 - 10.5 mg/dL    Total Protein 6.4 6.0 - 8.5 g/dL    Albumin 3.8 3.5 - 5.2 g/dL    ALT (SGPT) 45 (H) 1 - 33 U/L    AST (SGOT) 37 (H) 1 - 32 U/L    Alkaline Phosphatase 122 (H) 39 - 117 U/L    Total Bilirubin 0.6 0.0 - 1.2 mg/dL    Globulin 2.6 gm/dL    A/G Ratio 1.5 g/dL    BUN/Creatinine Ratio 20.0 7.0 - 25.0    Anion Gap 9.8 5.0 - 15.0 mmol/L    eGFR 102.5 >60.0 mL/min/1.73   CBC Auto Differential    Collection Time: 09/22/24  2:58 AM    Specimen: Blood   Result Value Ref Range    WBC 12.52 (H) 3.40 - 10.80 10*3/mm3    RBC 4.63 3.77 - 5.28 10*6/mm3    Hemoglobin 13.0 12.0 - 15.9 g/dL    Hematocrit 39.4 34.0 - 46.6 %    MCV 85.1 79.0 - 97.0 fL    MCH 28.1 26.6 - 33.0 pg    MCHC 33.0 31.5 - 35.7 g/dL    RDW 14.3 12.3 - 15.4 %    RDW-SD 44.4 37.0 - 54.0 fl    MPV 9.2 6.0 - 12.0 fL    Platelets 115 (L) 140 - 450 10*3/mm3    Neutrophil % 79.6 (H) 42.7 - 76.0 %    Lymphocyte % 10.1 (L)  19.6 - 45.3 %    Monocyte % 5.0 5.0 - 12.0 %    Eosinophil % 0.1 (L) 0.3 - 6.2 %    Basophil % 0.2 0.0 - 1.5 %    Immature Grans % 5.0 (H) 0.0 - 0.5 %    Neutrophils, Absolute 9.96 (H) 1.70 - 7.00 10*3/mm3    Lymphocytes, Absolute 1.26 0.70 - 3.10 10*3/mm3    Monocytes, Absolute 0.63 0.10 - 0.90 10*3/mm3    Eosinophils, Absolute 0.01 0.00 - 0.40 10*3/mm3    Basophils, Absolute 0.03 0.00 - 0.20 10*3/mm3    Immature Grans, Absolute 0.63 (H) 0.00 - 0.05 10*3/mm3    nRBC 0.0 0.0 - 0.2 /100 WBC   POC Glucose Once    Collection Time: 09/22/24  5:45 AM    Specimen: Blood   Result Value Ref Range    Glucose 143 (H) 70 - 130 mg/dL       Radiology:  Pertinent radiology studies were reviewed as described above      Medications have been reviewed separately in chart overview      ASSESSMENT:  Hypertension, new diagnosis, likely secondary to hyperaldosteronism    Hypokalemia    Leukocytosis    Thrombocytopenia    CAP (community acquired pneumonia)    Type 2 diabetes mellitus with hyperglycemia, without long-term current use of insulin    Pulmonary nodules  Probable metastatic lung cancer, needs pathology confirmation    Elevated blood pressure reading without diagnosis of hypertension           DISCUSSION/PLAN:   Blood pressure remains significantly elevated today despite spironolactone  Will increase frontal lactone to 100 mg twice daily  Add scheduled hydralazine  Potassium now within normal levels  Likely metastatic lung cancer with paraneoplastic syndrome  Await renin and aldosterone levels  Bronchoscopy with biopsy tomorrow morning        Armand Castle MD  [facility]   Office phone number: [phone]  Answering service phone number: [phone]    09/22/24  11:15 EDT    Dictation performed using Dragon dictation software

## 2024-09-22 NOTE — PROGRESS NOTES
Name: Kranthi Farley ADMIT: 2024   : 1960  PCP: Alexandra Keller PA-C    MRN: [id number] LOS: 3 days   AGE/SEX: 64 y.o. female  ROOM: [facility]     Subjective   Subjective   Patient awake and resting in bed, still with dyspnea, but relatively stable.        Objective   Objective   Vital Signs  Temp:  [97.9 °F (36.6 °C)-98.4 °F (36.9 °C)] 98.4 °F (36.9 °C)  Heart Rate:  [58-92] 84  Resp:  [18] 18  BP: (177-193)/() 193/94  SpO2:  [92 %-96 %] 93 %  on   ;   Device (Oxygen Therapy): room air  Body mass index is 20.05 kg/m².  Physical Exam  Vitals and nursing note reviewed.   Constitutional:       General: She is not in acute distress.     Appearance: She is ill-appearing.   Cardiovascular:      Rate and Rhythm: Normal rate and regular rhythm.      Pulses: Normal pulses.      Heart sounds: Normal heart sounds.   Pulmonary:      Effort: Pulmonary effort is normal. No respiratory distress.      Breath sounds: Decreased breath sounds present. No wheezing or rhonchi.   Abdominal:      General: Bowel sounds are normal.      Palpations: Abdomen is soft.      Tenderness: There is no abdominal tenderness.   Musculoskeletal:      Right lower leg: No edema.      Left lower leg: No edema.   Skin:     General: Skin is warm and dry.   Neurological:      Mental Status: She is alert.       Results Review     I reviewed the patient's new clinical results.  Results from last 7 days   Lab Units 24  0258 24  0351 24  1003 24   WBC 10*3/mm3 12.52* 14.19* 13.70* 17.19*   HEMOGLOBIN g/dL 13.0 12.6 12.3 14.2   PLATELETS 10*3/mm3 115* 106* 104* 109*     Results from last 7 days   Lab Units 24  0258 24  1705 24  0351 24  1003 24   SODIUM mmol/L 136  --  141 142 142   POTASSIUM mmol/L 3.9 3.5 3.5 3.0* 2.3*   CHLORIDE mmol/L 95*  --  99 96* 92*   CO2 mmol/L 31.2*  --  34.3* 37.4* 39.1*   BUN mg/dL 11  --  11 18 19   CREATININE mg/dL 0.55*  --  0.50* 0.65 0.67    GLUCOSE mg/dL 153*  --  142* 166* 190*   EGFR mL/min/1.73 102.5  --  104.9 98.5 97.7     Results from last 7 days   Lab Units 09/22/24  0258 09/21/24  0351 09/19/24 2006   ALBUMIN g/dL 3.8 3.6 3.9   BILIRUBIN mg/dL 0.6 0.6 1.3*   ALK PHOS U/L 122* 121* 143*   AST (SGOT) U/L 37* 32 31   ALT (SGPT) U/L 45* 44* 46*     Results from last 7 days   Lab Units 09/22/24  0258 09/21/24  0351 09/20/24  1003 09/19/24 2006   CALCIUM mg/dL 9.2 8.9 9.0 9.6   ALBUMIN g/dL 3.8 3.6  --  3.9   MAGNESIUM mg/dL  --   --  2.0 2.0     Results from last 7 days   Lab Units 09/20/24  0001 09/19/24 2006   PROCALCITONIN ng/mL  --  1.75*   LACTATE mmol/L 1.2  --      Glucose   Date/Time Value Ref Range Status   09/22/2024 1121 204 (H) 70 - 130 mg/dL Final   09/22/2024 0545 143 (H) 70 - 130 mg/dL Final   09/21/2024 2105 187 (H) 70 - 130 mg/dL Final   09/21/2024 1623 160 (H) 70 - 130 mg/dL Final   09/21/2024 1004 168 (H) 70 - 130 mg/dL Final   09/21/2024 0608 164 (H) 70 - 130 mg/dL Final   09/20/2024 2024 175 (H) 70 - 130 mg/dL Final       MRI Brain With & Without Contrast    Result Date: 9/21/2024  1. No acute intracranial abnormality.   No abnormal enhancement.   This report was finalized on 9/21/2024 5:25 AM by Dr. Felicitas Cosme M.D on Workstation: [id number]       I have personally reviewed all medications:  Scheduled Medications  budesonide-formoterol, 2 puff, Inhalation, BID - RT  cefTRIAXone, 2,000 mg, Intravenous, Q24H  enoxaparin, 40 mg, Subcutaneous, Q24H  hydrALAZINE, 25 mg, Oral, Q8H  insulin lispro, 2-7 Units, Subcutaneous, 4x Daily AC & at Bedtime  spironolactone, 100 mg, Oral, BID  tiotropium bromide monohydrate, 2 puff, Inhalation, Daily - RT    Infusions     Diet  Diet: Cardiac, Diabetic; Healthy Heart (2-3 Na+); Consistent Carbohydrate; Fluid Consistency: Thin (IDDSI 0)    I have personally reviewed:  [x]  Laboratory   [x]  Microbiology   [x]  Radiology   [x]  EKG/Telemetry  []  Cardiology/Vascular   []  Pathology     []  Records       Assessment/Plan     Active Hospital Problems    Diagnosis  POA    **Hypokalemia [E87.6]  Yes    Essential hypertension [I10]  Yes    Transaminitis [R74.01]  Yes    Severe malnutrition [E43]  Yes    Pulmonary nodules [R91.8]  Yes    Abnormal CT of the chest [R93.89]  Yes    Leukocytosis [D72.829]  Yes    Thrombocytopenia [D69.6]  Yes    CAP (community acquired pneumonia) [J18.9]  Yes    Type 2 diabetes mellitus with hyperglycemia, without long-term current use of insulin [E11.65]  Yes      Resolved Hospital Problems   No resolved problems to display.       64 y.o. female admitted with Hypokalemia.    Pulmonary nodules  Perihilar mass  Lymphadenopathy  Abnormal weight loss, tobacco use  - Patient with weight loss over past several months, coupled with ongoing tobacco use. CT Chest obtained, results have returned (09/20), impression suggesting primary left upper lobe malignancy with doreen (left hilar, mediastinal and left cervical), pulmonary, hepatic and osseous metastatic disease. Also noting multiple pulmonary nodules, and as above mass/adenopathy, tissue sampling recommended,  - Oncology and Pulmonology consulted, notes reviewed, plans for bronchoscopy likely Monday 09/23/24 with Pulmonology. Oncology planning for outpatient PET/CT. Will continue to follow their plans/recommendations. Greatly appreciate their help.    Pneumonia  Leukocytosis  - Findings of pneumonia noted on imaging, coupled with leukocytosis, elevated procalcitonin. UA is also abnormal, but patient without urinary complaints.  RVP/MRSA/strep/legionella negative, blood cultures no growth thus far. WBC remains elevated but overall improved. She is afebrile, will continue antibiotics as ordered for now. Continue to closely monitor respiratory status, as she did have documented sat <90% on 09/21 transiently, but most recent values have improved. She is in low 90s% at present on room air.    Elevated blood pressure without prior  diagnosis of hypertension, newly diagnosed hypertension  Hypokalemia  Metabolic alkalosis  - Elevated BP noted, patient not on antihypertensive medications previously, multiple elevations noted, will classify as newly diagnosed hypertension. Potassium low, metabolic alkalosis noted, concern for possible hyperaldosteronism.  - Aldosterone/renin pending. Nephrology consulted and following, Discussed with Dr. Castle, given that she has newly diagnosed hypertension, coupled with hypokalemia and metabolic alkalosis, strong suspicion for hyperaldosteronism. She has been started on Aldactone.   - Potassium has improved with repletion, alkalosis still noted.  - BP remains elevated, uncontrolled, Hydralazine being added today. Monitor response to this, may have to add additional agent or increase dose of current medications if no improvement noted.  - Will continue to follow Nephrology plans/recommendations. Greatly appreciate their help.    Type 2 diabetes mellitus with hyperglycemia  - Most recent A1c: 7.50%  - Current treatment regimen: SSI; Glucose mostly acceptable, does have some elevations outside goal.  - Add Glargine 2 units QHS. acceptable, Continue SSI as prescribed.  - Will monitor 24 hour insulin requirements and adjust as needed to achieve target inpatient range of 140-180.  - Diabetic diet    Thrombocytopenia  - Platelets found to be low on most recent labs. No indications for urgent intervention at present. Will monitor for now.  - Order repeat CBC in AM for reassessment.    COPD  - No evidence to suggest acute exacerbation.  Continue current medications as prescribed and closely monitor.  - Supplemental oxygen as needed to maintain O2 sats 88 to 92%, pulse oximetry, telemetry monitoring.    Transaminitis  - Noted on labs, worse from prior, but abdominal exam benign, CT showing abnormal findings involving liver, monitor for now with CMP. Consider further evaluation as clinically indicated.    SCDs for DVT  prophylaxis.  Full code.  Discussed with patient, nursing staff, and consulting provider.  Anticipate discharge home timing yet to be determined.  Expected Discharge Date: 9/23/2024; Expected Discharge Time:       DO [facility]  09/22/24

## 2024-09-22 NOTE — PLAN OF CARE
Problem: Adult Inpatient Plan of Care  Goal: Plan of Care Review  Outcome: Ongoing, Progressing  Flowsheets (Taken 9/22/2024 0300)  Progress: no change  Plan of Care Reviewed With: patient  Outcome Evaluation: No s/sx of distress noted at this time. Rested some throughout night. BP elevated, notified DEMARCO Oneill. No new orders noted at this time. KCL replaced per orders. IV antibiotics cont per orders. Will cont with current plan of care.  Goal: Patient-Specific Goal (Individualized)  Outcome: Ongoing, Progressing  Goal: Absence of Hospital-Acquired Illness or Injury  Outcome: Ongoing, Progressing  Intervention: Identify and Manage Fall Risk  Recent Flowsheet Documentation  Taken 9/22/2024 0239 by Chrystal Oglesby, RN  Safety Promotion/Fall Prevention:   activity supervised   assistive device/personal items within reach   clutter free environment maintained   fall prevention program maintained   lighting adjusted   nonskid shoes/slippers when out of bed   room organization consistent   safety round/check completed  Taken 9/22/2024 0007 by Chrystal Oglesby, RN  Safety Promotion/Fall Prevention:   activity supervised   assistive device/personal items within reach   clutter free environment maintained   fall prevention program maintained   lighting adjusted   nonskid shoes/slippers when out of bed   room organization consistent   safety round/check completed  Taken 9/21/2024 2219 by Chrystal Oglesby, RN  Safety Promotion/Fall Prevention:   assistive device/personal items within reach   activity supervised   clutter free environment maintained   fall prevention program maintained   lighting adjusted   nonskid shoes/slippers when out of bed   room organization consistent   safety round/check completed  Taken 9/21/2024 2024 by Chrystal Oglesby, RN  Safety Promotion/Fall Prevention:   activity supervised   assistive device/personal items within reach   clutter free environment maintained   fall prevention program maintained    lighting adjusted   nonskid shoes/slippers when out of bed   room organization consistent   safety round/check completed  Intervention: Prevent Skin Injury  Recent Flowsheet Documentation  Taken 9/22/2024 0239 by Chrystal Oglesby RN  Body Position:   position changed independently   left   side-lying  Taken 9/22/2024 0007 by Chrystal Oglesby RN  Body Position:   position changed independently   left   side-lying  Taken 9/21/2024 2219 by Chrystal Oglesby RN  Body Position: position changed independently  Taken 9/21/2024 2024 by Chrystal Oglesby RN  Body Position:   position changed independently   dangle, side of bed  Skin Protection:   adhesive use limited   incontinence pads utilized   transparent dressing maintained   tubing/devices free from skin contact  Intervention: Prevent and Manage VTE (Venous Thromboembolism) Risk  Recent Flowsheet Documentation  Taken 9/22/2024 0239 by Chrystal Oglesby RN  Activity Management: up ad joseph  Taken 9/22/2024 0007 by Chrystal Oglesby RN  Activity Management: up ad joseph  Taken 9/21/2024 2219 by Chrystal Oglesby RN  Activity Management: up ad joseph  Taken 9/21/2024 2024 by Chrystal Oglesby RN  Activity Management: up ad joseph  VTE Prevention/Management: (Lovenox) other (see comments)  Intervention: Prevent Infection  Recent Flowsheet Documentation  Taken 9/22/2024 0239 by Chrystal Oglesby RN  Infection Prevention:   hand hygiene promoted   rest/sleep promoted   single patient room provided  Taken 9/22/2024 0007 by Chrystal Oglesby RN  Infection Prevention:   hand hygiene promoted   rest/sleep promoted   single patient room provided  Taken 9/21/2024 2219 by Chrystal Oglesby RN  Infection Prevention:   hand hygiene promoted   rest/sleep promoted   single patient room provided  Taken 9/21/2024 2024 by Chrystal Oglesby RN  Infection Prevention:   hand hygiene promoted   rest/sleep promoted   single patient room provided  Goal: Optimal Comfort and Wellbeing  Outcome: Ongoing, Progressing  Intervention: Provide  Person-Centered Care  Recent Flowsheet Documentation  Taken 9/21/2024 2024 by Chrystal Oglesby RN  Trust Relationship/Rapport:   care explained   reassurance provided   thoughts/feelings acknowledged  Goal: Readiness for Transition of Care  Outcome: Ongoing, Progressing     Problem: Electrolyte Imbalance  Goal: Electrolyte Balance  Outcome: Ongoing, Progressing     Problem: Malnutrition  Goal: Improved Nutritional Intake  Outcome: Ongoing, Progressing     Problem: Fall Injury Risk  Goal: Absence of Fall and Fall-Related Injury  Outcome: Ongoing, Progressing  Intervention: Identify and Manage Contributors  Recent Flowsheet Documentation  Taken 9/22/2024 0239 by Chrystal Oglesby RN  Medication Review/Management: medications reviewed  Taken 9/22/2024 0007 by Chrystal Oglesby RN  Medication Review/Management: medications reviewed  Taken 9/21/2024 2219 by Chrystal Oglesby RN  Medication Review/Management: medications reviewed  Taken 9/21/2024 2024 by Chrystal Oglesby RN  Medication Review/Management: medications reviewed  Intervention: Promote Injury-Free Environment  Recent Flowsheet Documentation  Taken 9/22/2024 0239 by Chrystal Oglesby RN  Safety Promotion/Fall Prevention:   activity supervised   assistive device/personal items within reach   clutter free environment maintained   fall prevention program maintained   lighting adjusted   nonskid shoes/slippers when out of bed   room organization consistent   safety round/check completed  Taken 9/22/2024 0007 by Chrystal Oglesby RN  Safety Promotion/Fall Prevention:   activity supervised   assistive device/personal items within reach   clutter free environment maintained   fall prevention program maintained   lighting adjusted   nonskid shoes/slippers when out of bed   room organization consistent   safety round/check completed  Taken 9/21/2024 2219 by Chrystal Oglesby RN  Safety Promotion/Fall Prevention:   assistive device/personal items within reach   activity supervised   clutter free  environment maintained   fall prevention program maintained   lighting adjusted   nonskid shoes/slippers when out of bed   room organization consistent   safety round/check completed  Taken 9/21/2024 2024 by Chrystal Oglesby RN  Safety Promotion/Fall Prevention:   activity supervised   assistive device/personal items within reach   clutter free environment maintained   fall prevention program maintained   lighting adjusted   nonskid shoes/slippers when out of bed   room organization consistent   safety round/check completed   Goal Outcome Evaluation:  Plan of Care Reviewed With: patient        Progress: no change  Outcome Evaluation: No s/sx of distress noted at this time. Rested some throughout night. BP elevated, notified DEMARCO Oneill. No new orders noted at this time. KCL replaced per orders. IV antibiotics cont per orders. Will cont with current plan of care.

## 2024-09-22 NOTE — PROGRESS NOTES
Subjective     HISTORY OF PRESENT ILLNESS:   No acute issues overnight.  Patient sitting comfortably in bed.  Blood pressure remains elevated.  Patient denies any headaches, chest pain or lightheadedness.    Past Medical History, Past Surgical History, Social History, Family History have been reviewed and are without significant changes except as mentioned.    Review of Systems   A comprehensive 14 point review of systems was performed and was negative except as mentioned.    Medications:  The current medication list was reviewed in the EMR    ALLERGIES:  No Known Allergies    Objective      Vitals:    09/22/24 0611 09/22/24 0620 09/22/24 0625 09/22/24 0740   BP:    (!) 193/94   BP Location:    Right arm   Patient Position:    Lying   Pulse: 63   84   Resp:    18   Temp:    98.4 °F (36.9 °C)   TempSrc:    Oral   SpO2: 93% 93%  93%   Weight:   68.9 kg (151 lb 14.4 oz)    Height:              No data to display                Physical Exam    CONSTITUTIONAL:  Vital signs reviewed.  No distress, looks comfortable.  EYES:  Conjunctiva and lids unremarkable.    EARS,NOSE,MOUTH,THROAT:  Ears and nose appear unremarkable.    RESPIRATORY:  Normal respiratory effort.    CARDIOVASCULAR:  Normal heart rate  GASTROINTESTINAL: Abdomen appears unremarkable.  Nondistended   LYMPHATIC:  No cervical, supraclavicular lymphadenopathy.  SKIN:  Warm.  No rashes.  PSYCHIATRIC:  Normal judgment and insight.  Normal mood and affect.  NEURO: AAOx3, no obvious focal deficits.      RECENT LABS:  Hematology WBC   Date Value Ref Range Status   09/22/2024 12.52 (H) 3.40 - 10.80 10*3/mm3 Final     RBC   Date Value Ref Range Status   09/22/2024 4.63 3.77 - 5.28 10*6/mm3 Final     Hemoglobin   Date Value Ref Range Status   09/22/2024 13.0 12.0 - 15.9 g/dL Final     Hematocrit   Date Value Ref Range Status   09/22/2024 39.4 34.0 - 46.6 % Final     Platelets   Date Value Ref Range Status   09/22/2024 115 (L) 140 - 450 10*3/mm3 Final               Assessment & Plan   Patient is a pleasant 64-year-old female with medical history significant for COPD and history of SIADH/hyponatremia admitted with hypokalemia and weight loss.  Hematology/oncology consulted for suspected lung malignancy.       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    Has > 40-pack-year smoking history  The CT C/A/P demonstrated left perihilar mass, 3.6 x 2.1 cm with significant left hilar and mediastinal lymphadenopathy, prevascular doreen conglomerate measuring 4.3 x 2.2 cm.  Left hilar lymphadenopathy causes severe narrowing of the IRIS proximal segmental bronchi.  Numerous (greater than 20) hypoenhancing bilobar hepatic lesions, largest 2.4 cm in hepatic segment 4A.  Normal bilateral adrenal glands.  No abdominal or pelvic lymphadenopathy.  MRI brain negative for metastasis  9/20/2024: I reviewed the imaging and laboratory findings with patient in detail.  Informed her that they are highly concerning for primary lung malignancy.  Plan made to consult pulmonology for bronchoscopy and biopsy of the hilar/mediastinal lymph nodes.  9/22/24: Seen by pulmonology.  Plan for bronchoscopy and biopsy on Monday, 9/23.  I again reviewed her imaging findings and tried calling her daughter to go over the management plan.  Call went unanswered.  Informed patient that we can try calling her again tomorrow.  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  Nephrology following     # ?  Obstructive pneumonia: On antibiotics per primary    # Hypertensive urgency:  SBP in 190s.  Suspect primary hyperaldosteronism related  Started on spironolactone  and hydralazine.  Nephrology managing    # Mild thrombocytopenia: Monitor     Recommendations:  -Pulmonology planning bronchoscopy and biopsy on Monday  -Spironolactone and hydralazine per nephrology  -Plan for outpatient PET/CT  -Will continue to follow    I spent 52 minutes on this encounter, before, during & after the visit evaluating the patient, reviewing records and writing orders.

## 2024-09-23 ENCOUNTER — ANESTHESIA EVENT (OUTPATIENT)
Dept: GASTROENTEROLOGY | Facility: HOSPITAL | Age: 64
End: 2024-09-23
Payer: COMMERCIAL

## 2024-09-23 ENCOUNTER — ANESTHESIA (OUTPATIENT)
Dept: GASTROENTEROLOGY | Facility: HOSPITAL | Age: 64
End: 2024-09-23
Payer: COMMERCIAL

## 2024-09-23 ENCOUNTER — APPOINTMENT (OUTPATIENT)
Dept: GENERAL RADIOLOGY | Facility: HOSPITAL | Age: 64
End: 2024-09-23
Payer: COMMERCIAL

## 2024-09-23 LAB
ALBUMIN SERPL-MCNC: 3.7 G/DL (ref 3.5–5.2)
ALBUMIN/GLOB SERPL: 1.5 G/DL
ALP SERPL-CCNC: 129 U/L (ref 39–117)
ALT SERPL W P-5'-P-CCNC: 41 U/L (ref 1–33)
ANION GAP SERPL CALCULATED.3IONS-SCNC: 11 MMOL/L (ref 5–15)
AST SERPL-CCNC: 34 U/L (ref 1–32)
BASOPHILS # BLD MANUAL: 0 10*3/MM3 (ref 0–0.2)
BASOPHILS NFR BLD MANUAL: 0 % (ref 0–1.5)
BILIRUB SERPL-MCNC: 0.6 MG/DL (ref 0–1.2)
BUN SERPL-MCNC: 16 MG/DL (ref 8–23)
BUN/CREAT SERPL: 29.1 (ref 7–25)
CALCIUM SPEC-SCNC: 9.1 MG/DL (ref 8.6–10.5)
CHLORIDE SERPL-SCNC: 92 MMOL/L (ref 98–107)
CO2 SERPL-SCNC: 31 MMOL/L (ref 22–29)
CREAT SERPL-MCNC: 0.55 MG/DL (ref 0.57–1)
DEPRECATED RDW RBC AUTO: 43.8 FL (ref 37–54)
EGFRCR SERPLBLD CKD-EPI 2021: 102.5 ML/MIN/1.73
EOSINOPHIL # BLD MANUAL: 0 10*3/MM3 (ref 0–0.4)
EOSINOPHIL NFR BLD MANUAL: 0 % (ref 0.3–6.2)
ERYTHROCYTE [DISTWIDTH] IN BLOOD BY AUTOMATED COUNT: 14.5 % (ref 12.3–15.4)
GLOBULIN UR ELPH-MCNC: 2.5 GM/DL
GLUCOSE BLDC GLUCOMTR-MCNC: 123 MG/DL (ref 70–130)
GLUCOSE BLDC GLUCOMTR-MCNC: 125 MG/DL (ref 70–130)
GLUCOSE BLDC GLUCOMTR-MCNC: 141 MG/DL (ref 70–130)
GLUCOSE BLDC GLUCOMTR-MCNC: 169 MG/DL (ref 70–130)
GLUCOSE SERPL-MCNC: 129 MG/DL (ref 65–99)
HCT VFR BLD AUTO: 38.1 % (ref 34–46.6)
HGB BLD-MCNC: 13 G/DL (ref 12–15.9)
HYPOCHROMIA BLD QL: ABNORMAL
LYMPHOCYTES # BLD MANUAL: 0.68 10*3/MM3 (ref 0.7–3.1)
LYMPHOCYTES NFR BLD MANUAL: 5.2 % (ref 5–12)
MCH RBC QN AUTO: 28.5 PG (ref 26.6–33)
MCHC RBC AUTO-ENTMCNC: 34.1 G/DL (ref 31.5–35.7)
MCV RBC AUTO: 83.6 FL (ref 79–97)
METAMYELOCYTES NFR BLD MANUAL: 1 % (ref 0–0)
MONOCYTES # BLD: 0.68 10*3/MM3 (ref 0.1–0.9)
MYELOCYTES NFR BLD MANUAL: 2.1 % (ref 0–0)
NEUTROPHILS # BLD AUTO: 11.37 10*3/MM3 (ref 1.7–7)
NEUTROPHILS NFR BLD MANUAL: 86.5 % (ref 42.7–76)
NRBC BLD AUTO-RTO: 0 /100 WBC (ref 0–0.2)
PLAT MORPH BLD: NORMAL
PLATELET # BLD AUTO: 103 10*3/MM3 (ref 140–450)
PMV BLD AUTO: 9.1 FL (ref 6–12)
POTASSIUM SERPL-SCNC: 2.8 MMOL/L (ref 3.5–5.2)
POTASSIUM SERPL-SCNC: 2.9 MMOL/L (ref 3.5–5.2)
PROT SERPL-MCNC: 6.2 G/DL (ref 6–8.5)
QT INTERVAL: 345 MS
QTC INTERVAL: 452 MS
RBC # BLD AUTO: 4.56 10*6/MM3 (ref 3.77–5.28)
SODIUM SERPL-SCNC: 134 MMOL/L (ref 136–145)
VARIANT LYMPHS NFR BLD MANUAL: 5.2 % (ref 19.6–45.3)
WBC MORPH BLD: NORMAL
WBC NRBC COR # BLD AUTO: 13.14 10*3/MM3 (ref 3.4–10.8)

## 2024-09-23 PROCEDURE — 87206 SMEAR FLUORESCENT/ACID STAI: CPT | Performed by: HOSPITALIST

## 2024-09-23 PROCEDURE — 07D78ZX EXTRACTION OF THORAX LYMPHATIC, VIA NATURAL OR ARTIFICIAL OPENING ENDOSCOPIC, DIAGNOSTIC: ICD-10-PCS | Performed by: HOSPITALIST

## 2024-09-23 PROCEDURE — 63710000001 INSULIN LISPRO (HUMAN) PER 5 UNITS

## 2024-09-23 PROCEDURE — 71045 X-RAY EXAM CHEST 1 VIEW: CPT

## 2024-09-23 PROCEDURE — 93010 ELECTROCARDIOGRAM REPORT: CPT | Performed by: INTERNAL MEDICINE

## 2024-09-23 PROCEDURE — C1726 CATH, BAL DIL, NON-VASCULAR: HCPCS | Performed by: HOSPITALIST

## 2024-09-23 PROCEDURE — 25010000002 POTASSIUM CHLORIDE 10 MEQ/100ML SOLUTION: Performed by: STUDENT IN AN ORGANIZED HEALTH CARE EDUCATION/TRAINING PROGRAM

## 2024-09-23 PROCEDURE — 93005 ELECTROCARDIOGRAM TRACING: CPT | Performed by: HOSPITALIST

## 2024-09-23 PROCEDURE — 94640 AIRWAY INHALATION TREATMENT: CPT

## 2024-09-23 PROCEDURE — 87070 CULTURE OTHR SPECIMN AEROBIC: CPT | Performed by: HOSPITALIST

## 2024-09-23 PROCEDURE — 88342 IMHCHEM/IMCYTCHM 1ST ANTB: CPT | Performed by: HOSPITALIST

## 2024-09-23 PROCEDURE — 88173 CYTOPATH EVAL FNA REPORT: CPT | Performed by: HOSPITALIST

## 2024-09-23 PROCEDURE — 82948 REAGENT STRIP/BLOOD GLUCOSE: CPT

## 2024-09-23 PROCEDURE — 0BB88ZX EXCISION OF LEFT UPPER LOBE BRONCHUS, VIA NATURAL OR ARTIFICIAL OPENING ENDOSCOPIC, DIAGNOSTIC: ICD-10-PCS | Performed by: HOSPITALIST

## 2024-09-23 PROCEDURE — 80053 COMPREHEN METABOLIC PANEL: CPT | Performed by: STUDENT IN AN ORGANIZED HEALTH CARE EDUCATION/TRAINING PROGRAM

## 2024-09-23 PROCEDURE — 25010000002 CEFTRIAXONE PER 250 MG

## 2024-09-23 PROCEDURE — 94799 UNLISTED PULMONARY SVC/PX: CPT

## 2024-09-23 PROCEDURE — 63710000001 INSULIN GLARGINE PER 5 UNITS: Performed by: STUDENT IN AN ORGANIZED HEALTH CARE EDUCATION/TRAINING PROGRAM

## 2024-09-23 PROCEDURE — 88341 IMHCHEM/IMCYTCHM EA ADD ANTB: CPT | Performed by: HOSPITALIST

## 2024-09-23 PROCEDURE — 88305 TISSUE EXAM BY PATHOLOGIST: CPT | Performed by: HOSPITALIST

## 2024-09-23 PROCEDURE — 84132 ASSAY OF SERUM POTASSIUM: CPT | Performed by: STUDENT IN AN ORGANIZED HEALTH CARE EDUCATION/TRAINING PROGRAM

## 2024-09-23 PROCEDURE — 94761 N-INVAS EAR/PLS OXIMETRY MLT: CPT

## 2024-09-23 PROCEDURE — 85007 BL SMEAR W/DIFF WBC COUNT: CPT | Performed by: STUDENT IN AN ORGANIZED HEALTH CARE EDUCATION/TRAINING PROGRAM

## 2024-09-23 PROCEDURE — 25010000002 PROPOFOL 10 MG/ML EMULSION: Performed by: NURSE ANESTHETIST, CERTIFIED REGISTERED

## 2024-09-23 PROCEDURE — 85025 COMPLETE CBC W/AUTO DIFF WBC: CPT | Performed by: STUDENT IN AN ORGANIZED HEALTH CARE EDUCATION/TRAINING PROGRAM

## 2024-09-23 PROCEDURE — 87102 FUNGUS ISOLATION CULTURE: CPT | Performed by: HOSPITALIST

## 2024-09-23 PROCEDURE — 87116 MYCOBACTERIA CULTURE: CPT | Performed by: HOSPITALIST

## 2024-09-23 PROCEDURE — 25810000003 SODIUM CHLORIDE 0.9 % SOLUTION: Performed by: HOSPITALIST

## 2024-09-23 PROCEDURE — 25010000002 ONDANSETRON PER 1 MG: Performed by: NURSE ANESTHETIST, CERTIFIED REGISTERED

## 2024-09-23 PROCEDURE — 87205 SMEAR GRAM STAIN: CPT | Performed by: HOSPITALIST

## 2024-09-23 PROCEDURE — 99231 SBSQ HOSP IP/OBS SF/LOW 25: CPT | Performed by: INTERNAL MEDICINE

## 2024-09-23 PROCEDURE — 88360 TUMOR IMMUNOHISTOCHEM/MANUAL: CPT | Performed by: HOSPITALIST

## 2024-09-23 RX ORDER — DROPERIDOL 2.5 MG/ML
0.62 INJECTION, SOLUTION INTRAMUSCULAR; INTRAVENOUS
Status: DISCONTINUED | OUTPATIENT
Start: 2024-09-23 | End: 2024-09-23 | Stop reason: HOSPADM

## 2024-09-23 RX ORDER — NALOXONE HCL 0.4 MG/ML
0.2 VIAL (ML) INJECTION AS NEEDED
Status: DISCONTINUED | OUTPATIENT
Start: 2024-09-23 | End: 2024-09-23 | Stop reason: HOSPADM

## 2024-09-23 RX ORDER — POTASSIUM CHLORIDE 7.45 MG/ML
10 INJECTION INTRAVENOUS
Status: DISPENSED | OUTPATIENT
Start: 2024-09-23 | End: 2024-09-23

## 2024-09-23 RX ORDER — PROMETHAZINE HYDROCHLORIDE 25 MG/1
25 SUPPOSITORY RECTAL ONCE AS NEEDED
Status: DISCONTINUED | OUTPATIENT
Start: 2024-09-23 | End: 2024-09-23 | Stop reason: HOSPADM

## 2024-09-23 RX ORDER — ROCURONIUM BROMIDE 10 MG/ML
INJECTION, SOLUTION INTRAVENOUS AS NEEDED
Status: DISCONTINUED | OUTPATIENT
Start: 2024-09-23 | End: 2024-09-23 | Stop reason: SURG

## 2024-09-23 RX ORDER — LIDOCAINE HYDROCHLORIDE 20 MG/ML
INJECTION, SOLUTION INFILTRATION; PERINEURAL AS NEEDED
Status: DISCONTINUED | OUTPATIENT
Start: 2024-09-23 | End: 2024-09-23 | Stop reason: SURG

## 2024-09-23 RX ORDER — ONDANSETRON 2 MG/ML
INJECTION INTRAMUSCULAR; INTRAVENOUS AS NEEDED
Status: DISCONTINUED | OUTPATIENT
Start: 2024-09-23 | End: 2024-09-23 | Stop reason: SURG

## 2024-09-23 RX ORDER — PROMETHAZINE HYDROCHLORIDE 25 MG/1
25 TABLET ORAL ONCE AS NEEDED
Status: DISCONTINUED | OUTPATIENT
Start: 2024-09-23 | End: 2024-09-23 | Stop reason: HOSPADM

## 2024-09-23 RX ORDER — FENTANYL CITRATE 50 UG/ML
50 INJECTION, SOLUTION INTRAMUSCULAR; INTRAVENOUS
Status: DISCONTINUED | OUTPATIENT
Start: 2024-09-23 | End: 2024-09-23 | Stop reason: HOSPADM

## 2024-09-23 RX ORDER — AMLODIPINE BESYLATE 5 MG/1
5 TABLET ORAL
Status: DISCONTINUED | OUTPATIENT
Start: 2024-09-23 | End: 2024-09-25 | Stop reason: HOSPADM

## 2024-09-23 RX ORDER — POTASSIUM CHLORIDE 7.45 MG/ML
10 INJECTION INTRAVENOUS
Status: COMPLETED | OUTPATIENT
Start: 2024-09-23 | End: 2024-09-23

## 2024-09-23 RX ORDER — EPHEDRINE SULFATE 50 MG/ML
5 INJECTION, SOLUTION INTRAVENOUS ONCE AS NEEDED
Status: DISCONTINUED | OUTPATIENT
Start: 2024-09-23 | End: 2024-09-23 | Stop reason: HOSPADM

## 2024-09-23 RX ORDER — SODIUM CHLORIDE 0.9 % (FLUSH) 0.9 %
10 SYRINGE (ML) INJECTION AS NEEDED
Status: DISCONTINUED | OUTPATIENT
Start: 2024-09-23 | End: 2024-09-23 | Stop reason: HOSPADM

## 2024-09-23 RX ORDER — HYDRALAZINE HYDROCHLORIDE 20 MG/ML
5 INJECTION INTRAMUSCULAR; INTRAVENOUS
Status: DISCONTINUED | OUTPATIENT
Start: 2024-09-23 | End: 2024-09-23 | Stop reason: HOSPADM

## 2024-09-23 RX ORDER — METOPROLOL TARTRATE 1 MG/ML
INJECTION, SOLUTION INTRAVENOUS AS NEEDED
Status: DISCONTINUED | OUTPATIENT
Start: 2024-09-23 | End: 2024-09-23 | Stop reason: SURG

## 2024-09-23 RX ORDER — IPRATROPIUM BROMIDE AND ALBUTEROL SULFATE 2.5; .5 MG/3ML; MG/3ML
3 SOLUTION RESPIRATORY (INHALATION) ONCE
Status: CANCELLED | OUTPATIENT
Start: 2024-09-23

## 2024-09-23 RX ORDER — HYDRALAZINE HYDROCHLORIDE 50 MG/1
50 TABLET, FILM COATED ORAL EVERY 8 HOURS SCHEDULED
Status: DISCONTINUED | OUTPATIENT
Start: 2024-09-23 | End: 2024-09-25 | Stop reason: HOSPADM

## 2024-09-23 RX ORDER — DIPHENHYDRAMINE HYDROCHLORIDE 50 MG/ML
12.5 INJECTION INTRAMUSCULAR; INTRAVENOUS
Status: DISCONTINUED | OUTPATIENT
Start: 2024-09-23 | End: 2024-09-23 | Stop reason: HOSPADM

## 2024-09-23 RX ORDER — SODIUM CHLORIDE 9 MG/ML
1000 INJECTION, SOLUTION INTRAVENOUS CONTINUOUS
Status: DISCONTINUED | OUTPATIENT
Start: 2024-09-23 | End: 2024-09-24

## 2024-09-23 RX ORDER — IPRATROPIUM BROMIDE AND ALBUTEROL SULFATE 2.5; .5 MG/3ML; MG/3ML
3 SOLUTION RESPIRATORY (INHALATION)
Status: DISCONTINUED | OUTPATIENT
Start: 2024-09-23 | End: 2024-09-25 | Stop reason: HOSPADM

## 2024-09-23 RX ORDER — IPRATROPIUM BROMIDE AND ALBUTEROL SULFATE 2.5; .5 MG/3ML; MG/3ML
3 SOLUTION RESPIRATORY (INHALATION) ONCE AS NEEDED
Status: DISCONTINUED | OUTPATIENT
Start: 2024-09-23 | End: 2024-09-23 | Stop reason: HOSPADM

## 2024-09-23 RX ORDER — FLUMAZENIL 0.1 MG/ML
0.2 INJECTION INTRAVENOUS AS NEEDED
Status: DISCONTINUED | OUTPATIENT
Start: 2024-09-23 | End: 2024-09-23 | Stop reason: HOSPADM

## 2024-09-23 RX ORDER — PROPOFOL 10 MG/ML
VIAL (ML) INTRAVENOUS AS NEEDED
Status: DISCONTINUED | OUTPATIENT
Start: 2024-09-23 | End: 2024-09-23 | Stop reason: SURG

## 2024-09-23 RX ORDER — LABETALOL HYDROCHLORIDE 5 MG/ML
5 INJECTION, SOLUTION INTRAVENOUS
Status: DISCONTINUED | OUTPATIENT
Start: 2024-09-23 | End: 2024-09-23 | Stop reason: HOSPADM

## 2024-09-23 RX ADMIN — POTASSIUM CHLORIDE 10 MEQ: 7.46 INJECTION, SOLUTION INTRAVENOUS at 11:15

## 2024-09-23 RX ADMIN — SPIRONOLACTONE 100 MG: 100 TABLET ORAL at 16:51

## 2024-09-23 RX ADMIN — PROPOFOL 150 MG: 10 INJECTION, EMULSION INTRAVENOUS at 14:11

## 2024-09-23 RX ADMIN — METOPROLOL TARTRATE 2.5 MG: 1 INJECTION, SOLUTION INTRAVENOUS at 14:54

## 2024-09-23 RX ADMIN — INSULIN GLARGINE 2 UNITS: 100 INJECTION, SOLUTION SUBCUTANEOUS at 20:54

## 2024-09-23 RX ADMIN — METOPROLOL TARTRATE 2.5 MG: 1 INJECTION, SOLUTION INTRAVENOUS at 14:24

## 2024-09-23 RX ADMIN — ONDANSETRON 4 MG: 2 INJECTION INTRAMUSCULAR; INTRAVENOUS at 14:36

## 2024-09-23 RX ADMIN — INSULIN LISPRO 2 UNITS: 100 INJECTION, SOLUTION INTRAVENOUS; SUBCUTANEOUS at 16:56

## 2024-09-23 RX ADMIN — AMLODIPINE BESYLATE 5 MG: 5 TABLET ORAL at 12:28

## 2024-09-23 RX ADMIN — IPRATROPIUM BROMIDE AND ALBUTEROL SULFATE 3 ML: .5; 3 SOLUTION RESPIRATORY (INHALATION) at 15:34

## 2024-09-23 RX ADMIN — POTASSIUM CHLORIDE 10 MEQ: 7.46 INJECTION, SOLUTION INTRAVENOUS at 16:52

## 2024-09-23 RX ADMIN — HYDRALAZINE HYDROCHLORIDE 25 MG: 25 TABLET ORAL at 06:17

## 2024-09-23 RX ADMIN — POTASSIUM CHLORIDE 10 MEQ: 7.46 INJECTION, SOLUTION INTRAVENOUS at 13:17

## 2024-09-23 RX ADMIN — POTASSIUM CHLORIDE 10 MEQ: 7.46 INJECTION, SOLUTION INTRAVENOUS at 20:51

## 2024-09-23 RX ADMIN — POTASSIUM CHLORIDE 10 MEQ: 7.46 INJECTION, SOLUTION INTRAVENOUS at 08:32

## 2024-09-23 RX ADMIN — ROCURONIUM BROMIDE 40 MG: 10 INJECTION, SOLUTION INTRAVENOUS at 14:11

## 2024-09-23 RX ADMIN — CEFTRIAXONE 2000 MG: 2 INJECTION, POWDER, FOR SOLUTION INTRAMUSCULAR; INTRAVENOUS at 22:39

## 2024-09-23 RX ADMIN — POTASSIUM CHLORIDE 10 MEQ: 7.46 INJECTION, SOLUTION INTRAVENOUS at 19:34

## 2024-09-23 RX ADMIN — SODIUM CHLORIDE 1000 ML: 9 INJECTION, SOLUTION INTRAVENOUS at 13:21

## 2024-09-23 RX ADMIN — HYDRALAZINE HYDROCHLORIDE 50 MG: 25 TABLET ORAL at 22:41

## 2024-09-23 RX ADMIN — SPIRONOLACTONE 100 MG: 100 TABLET ORAL at 08:32

## 2024-09-23 RX ADMIN — LIDOCAINE HYDROCHLORIDE 100 MG: 20 INJECTION, SOLUTION INFILTRATION; PERINEURAL at 14:08

## 2024-09-23 RX ADMIN — IPRATROPIUM BROMIDE AND ALBUTEROL SULFATE 3 ML: .5; 3 SOLUTION RESPIRATORY (INHALATION) at 12:56

## 2024-09-23 RX ADMIN — HYDRALAZINE HYDROCHLORIDE 50 MG: 25 TABLET ORAL at 16:56

## 2024-09-23 NOTE — PAYOR COMM NOTE
"Janet Yo (64 y.o. Female)        PLEASE SEE ATTACHED FOR CONTINUED STAY REVIEW.     REF#[id number]     PLEASE CALL  OR  [phone]    THANK YOU    BRITTANY ROSSI LPN CCP   Date of Birth   1960    Social Security Number       Address   [address] [place] [address]    Home Phone   [phone]    MRN   [id number]       Caodaism   None    Marital Status                               Admission Date   9/19/24    Admission Type   Emergency    Admitting Provider   Kelly Reardon MD    Attending Provider   Royer Prajapati DO    Department, Room/Bed   [address], [address]/1       Discharge Date       Discharge Disposition       Discharge Destination                                 Attending Provider: Royer Prajapati DO    Allergies: No Known Allergies    Isolation: None   Infection: None   Code Status: CPR    Ht: 185.4 cm (72.99\")   Wt: 68.5 kg (151 lb)    Admission Cmt: None   Principal Problem: Hypokalemia [E87.6]                   Active Insurance as of 9/19/2024       Primary Coverage       Payor Plan Insurance Group Employer/Plan Group    [employer] BLUE [employer]OSS [employer] CROSS BLUE SHIELD PPO [id number]       Payor Plan Address Payor Plan Phone Number Payor Plan Fax Number Effective Dates    [phone]  1/1/2018 - None Entered    [facility] [id number]         Subscriber Name Subscriber Birth Date Member ID       JANET YO 1960 [id number]                     Emergency Contacts        (Rel.) Home Phone Work Phone Mobile Phone    Coreen Isaac (Daughter) -- -- [phone]    Bobbi Isaac (Daughter) -- -- [phone]    LUCIANO YO (Spouse) -- -- [phone]              [place]: [facility] [id number]  Tax ID [id number]  Oxygen Therapy (last day)       Date/Time SpO2 Device (Oxygen Therapy) Flow (L/min) Oxygen Concentration (%) ETCO2 (mmHg)    09/23/24 0700 95 -- -- -- --    09/23/24 0657 96 room air " -- -- --    09/23/24 0601 94 room air -- -- --    09/23/24 0427 91 -- -- -- --    09/23/24 0330 94 room air -- -- --    09/23/24 0247 94 -- -- -- --    09/23/24 0210 95 -- -- -- --    09/22/24 2333 92 room air -- -- --    09/22/24 2300 90 -- -- -- --    09/22/24 2259 90 -- -- -- --    09/22/24 2022 -- room air -- -- --    09/22/24 1941 92 room air -- -- --    09/22/24 1418 -- room air -- -- --    09/22/24 1405 97 room air -- -- --    09/22/24 0932 -- room air -- -- --    09/22/24 0740 93 room air -- -- --    09/22/24 0620 93 -- -- -- --    09/22/24 0611 93 -- -- -- --    09/22/24 0500 95 -- -- -- --    09/22/24 0300 95 -- -- -- --    09/22/24 0259 96 -- -- -- --    09/22/24 0238 95 -- -- -- --          Intake & Output (last day)         09/22 0701 09/23 0700 09/23 0701 09/24 0700          Urine Unmeasured Occurrence 3 x     Stool Unmeasured Occurrence 1 x           Lines, Drains & Airways       Active LDAs       Name Placement date Placement time Site Days    Peripheral IV 09/19/24 2052 Anterior;Proximal;Right Forearm 09/19/24 2052  Forearm  3    Peripheral IV 09/23/24 0632 Posterior;Right Hand 09/23/24 0632  Hand  less than 1                  Medication Administration Report for Kranthi Farley as of 9/22/24 through 9/23/24     Legend:    Given Hold Not Given Due Canceled Entry Other Actions    Time Time (Time) Time Time-Action         Discontinued     Completed     Future     MAR Hold     Linked             Medications 09/22/24 09/23/24     acetaminophen (TYLENOL) tablet 650 mg  Dose: 650 mg  Freq: Every 4 Hours PRN Route: PO  PRN Reason: Mild Pain  Start: 09/19/24 2112     Admin Instructions:   If given for fever, use fever parameter: fever greater than 100.4 °F  Based on patient request - if ordered for moderate or severe pain, provider allows for administration of a medication prescribed for a lower pain scale.    Do not exceed 4 grams of acetaminophen in a 24 hr period. Max dose of 2gm for AST/ALT  "greater than 120 units/L.    If given for pain, use the following pain scale:   Mild Pain = Pain Score of 1-3, CPOT 1-2  Moderate Pain = Pain Score of 4-6, CPOT 3-4  Severe Pain = Pain Score of 7-10, CPOT 5-8           sennosides-docusate (PERICOLACE) 8.6-50 MG per tablet 2 tablet  Dose: 2 tablet  Freq: 2 Times Daily PRN Route: PO  PRN Reason: Constipation  Start: 09/19/24 2112     Admin Instructions:   Start bowel management regimen if patient has not had a bowel movement after 12 hours.          And   polyethylene glycol (MIRALAX) packet 17 g  Dose: 17 g  Freq: Daily PRN Route: PO  PRN Reason: Constipation  PRN Comment: Use if senna-docusate is ineffective  Start: 09/19/24 2112     Admin Instructions:   Use if no bowel movement after 12 hours. Mix in 6-8 ounces of water.  Use 4-8 ounces of water, tea, or juice for each 17 gram dose.          And   bisacodyl (DULCOLAX) EC tablet 5 mg  Dose: 5 mg  Freq: Daily PRN Route: PO  PRN Reason: Constipation  PRN Comment: Use if polyethylene glycol is ineffective  Start: 09/19/24 2112     Admin Instructions:   Use if no bowel movement after 12 hours.  Swallow whole. Do not crush, split, or chew tablet.          And   bisacodyl (DULCOLAX) suppository 10 mg  Dose: 10 mg  Freq: Daily PRN Route: RE  PRN Reason: Constipation  PRN Comment: Use if bisacodyl oral is ineffective  Start: 09/19/24 2112     Admin Instructions:   Use if no bowel movement after 12 hours.  Hold for diarrhea          Calcium Replacement - Follow Nurse / BPA Driven Protocol  Freq: As Needed Route: XX  PRN Reason: Other  Start: 09/20/24 1352     Admin Instructions:   Open Order & Select \"BHS Electrolyte Replacement Protocol Algorithm\" to View Details          dextrose (D50W) (25 g/50 mL) IV injection 25 g  Dose: 25 g  Freq: Every 15 Minutes PRN Route: IV  PRN Reason: Low Blood Sugar  PRN Comment: Blood Sugar Less Than 70  Start: 09/19/24 6868     Admin Instructions:   Blood sugar less than 70; patient has IV " "access - Unresponsive, NPO or Unable To Safely Swallow          dextrose (GLUTOSE) oral gel 15 g  Dose: 15 g  Freq: Every 15 Minutes PRN Route: PO  PRN Reason: Low Blood Sugar  PRN Comment: Blood sugar less than 70  Start: 09/19/24 2355     Admin Instructions:   BS<70, Patient Alert, Is not NPO, Can safely swallow.          glucagon (GLUCAGEN) injection 1 mg  Dose: 1 mg  Freq: Every 15 Minutes PRN Route: IM  PRN Reason: Low Blood Sugar  PRN Comment: Blood Glucose Less Than 70  Start: 09/19/24 2355     Admin Instructions:   Blood Glucose Less Than 70 - Patient Without IV Access - Unresponsive, NPO or Unable To Safely Swallow  Reconstitute powder for injection by adding 1 mL of -supplied sterile diluent or sterile water for injection to a vial containing 1 mg of the drug, to provide solutions containing 1 mg/mL. Shake vial gently to dissolve.          ipratropium-albuterol (DUO-NEB) nebulizer solution 3 mL  Dose: 3 mL  Freq: Every 6 Hours PRN Route: NEBULIZATION  PRN Reasons: Shortness of Air,Wheezing  Start: 09/19/24 2354     Admin Instructions:   Include Respiratory Treatment Education          Magnesium Standard Dose Replacement - Follow Nurse / BPA Driven Protocol  Freq: As Needed Route: XX  PRN Reason: Other  Start: 09/20/24 1352     Admin Instructions:   Open Order & Select \"BHS Electrolyte Replacement Protocol Algorithm\" to View Details          nitroglycerin (NITROSTAT) SL tablet 0.4 mg  Dose: 0.4 mg  Freq: Every 5 Minutes PRN Route: SL  PRN Reason: Chest Pain  PRN Comment: Only if SBP Greater Than 100  Start: 09/19/24 2112     Admin Instructions:   If Pain Unrelieved After 3 Doses Notify MD  May administer up to 3 doses per episode.           ondansetron ODT (ZOFRAN-ODT) disintegrating tablet 4 mg  Dose: 4 mg  Freq: Every 6 Hours PRN Route: PO  PRN Reasons: Nausea,Vomiting  Start: 09/19/24 2112     Admin Instructions:   If BOTH ondansetron (ZOFRAN) and promethazine (PHENERGAN) are ordered use " "ondansetron first and THEN promethazine IF ondansetron is ineffective.  Place on tongue and allow to dissolve.          Or   ondansetron (ZOFRAN) injection 4 mg  Dose: 4 mg  Freq: Every 6 Hours PRN Route: IV  PRN Reasons: Nausea,Vomiting  Start: 24          Phosphorus Replacement - Follow Nurse / BPA Driven Protocol  Freq: As Needed Route: XX  PRN Reason: Other  Start: 24 135     Admin Instructions:   Open Order & Select \"BHS Electrolyte Replacement Protocol Algorithm\" to View Details          Potassium Replacement - Follow Nurse / BPA Driven Protocol  Freq: As Needed Route: XX  PRN Reason: Other  Start: 24     Admin Instructions:   Open Order & Select \"BHS Electrolyte Replacement Protocol Algorithm\" to View Details          Potassium Replacement - Follow Nurse / BPA Driven Protocol  Freq: As Needed Route: XX  PRN Reason: Other  Start: 24     Admin Instructions:   Open Order & Select \"BHS Electrolyte Replacement Protocol Algorithm\" to View Details          Discontinued Medications  Medications 24      cefTRIAXone (ROCEPHIN) 2 g injection  - ADS Override Pull  Start: 24 End: 24 1156     Admin Instructions:   Created by cabinet override  Caution: Look alike/sound alike drug alert          Pharmacy Consult  Freq: Continuous PRN Route: XX  PRN Reason: Consult  Start: 24 0114 End: 24 0939     Order specific questions:   Consult for Determine which inhalers are on her insurance for COPD management                             Physician Progress Notes (last 48 hours)             Royer Prajapati DO   Physician  Hospitalist     Progress Notes     Signed     Date of Service: 24 0942  Creation Time: 24 1340     Signed       Expand All Collapse All[]Expand All by Default         Name: Kranthi Farley ADMIT: 2024   : 1960  PCP: Alexandra Keller PA-C    MRN: [id number] LOS: 3 days   AGE/SEX: 64 y.o. female  ROOM: [facility]    "   Subjective      Subjective  Patient awake and resting in bed, still with dyspnea, but relatively stable.            Objective      Objective  Vital Signs  Temp:  [97.9 °F (36.6 °C)-98.4 °F (36.9 °C)] 98.4 °F (36.9 °C)  Heart Rate:  [58-92] 84  Resp:  [18] 18  BP: (177-193)/() 193/94  SpO2:  [92 %-96 %] 93 %  on   ;   Device (Oxygen Therapy): room air  Body mass index is 20.05 kg/m².  Physical Exam  Vitals and nursing note reviewed.   Constitutional:       General: She is not in acute distress.     Appearance: She is ill-appearing.   Cardiovascular:      Rate and Rhythm: Normal rate and regular rhythm.      Pulses: Normal pulses.      Heart sounds: Normal heart sounds.   Pulmonary:      Effort: Pulmonary effort is normal. No respiratory distress.      Breath sounds: Decreased breath sounds present. No wheezing or rhonchi.   Abdominal:      General: Bowel sounds are normal.      Palpations: Abdomen is soft.      Tenderness: There is no abdominal tenderness.   Musculoskeletal:      Right lower leg: No edema.      Left lower leg: No edema.   Skin:     General: Skin is warm and dry.   Neurological:      Mental Status: She is alert.         Results Review     I reviewed the patient's new clinical results.          Results from last 7 days   Lab Units 09/22/24  0258 09/21/24  0351 09/20/24  1003 09/19/24 2006   WBC 10*3/mm3 12.52* 14.19* 13.70* 17.19*   HEMOGLOBIN g/dL 13.0 12.6 12.3 14.2   PLATELETS 10*3/mm3 115* 106* 104* 109*               Results from last 7 days   Lab Units 09/22/24  0258 09/21/24  1705 09/21/24  0351 09/20/24  1003 09/19/24 2006   SODIUM mmol/L 136  --  141 142 142   POTASSIUM mmol/L 3.9 3.5 3.5 3.0* 2.3*   CHLORIDE mmol/L 95*  --  99 96* 92*   CO2 mmol/L 31.2*  --  34.3* 37.4* 39.1*   BUN mg/dL 11  --  11 18 19   CREATININE mg/dL 0.55*  --  0.50* 0.65 0.67   GLUCOSE mg/dL 153*  --  142* 166* 190*   EGFR mL/min/1.73 102.5  --  104.9 98.5 97.7             Results from last 7 days   Lab Units  09/22/24  0258 09/21/24  0351 09/19/24 2006   ALBUMIN g/dL 3.8 3.6 3.9   BILIRUBIN mg/dL 0.6 0.6 1.3*   ALK PHOS U/L 122* 121* 143*   AST (SGOT) U/L 37* 32 31   ALT (SGPT) U/L 45* 44* 46*              Results from last 7 days   Lab Units 09/22/24  0258 09/21/24  0351 09/20/24  1003 09/19/24 2006   CALCIUM mg/dL 9.2 8.9 9.0 9.6   ALBUMIN g/dL 3.8 3.6  --  3.9   MAGNESIUM mg/dL  --   --  2.0 2.0            Results from last 7 days   Lab Units 09/20/24  0001 09/19/24 2006   PROCALCITONIN ng/mL  --  1.75*   LACTATE mmol/L 1.2  --             Glucose   Date/Time Value Ref Range Status   09/22/2024 1121 204 (H) 70 - 130 mg/dL Final   09/22/2024 0545 143 (H) 70 - 130 mg/dL Final   09/21/2024 2105 187 (H) 70 - 130 mg/dL Final   09/21/2024 1623 160 (H) 70 - 130 mg/dL Final   09/21/2024 1004 168 (H) 70 - 130 mg/dL Final   09/21/2024 0608 164 (H) 70 - 130 mg/dL Final   09/20/2024 2024 175 (H) 70 - 130 mg/dL Final         MRI Brain With & Without Contrast     Result Date: 9/21/2024  1. No acute intracranial abnormality.   No abnormal enhancement.   This report was finalized on 9/21/2024 5:25 AM by Dr. Felicitas Cosme M.D on Workstation: [id number]        I have personally reviewed all medications:  Scheduled Medications    Scheduled Medication   budesonide-formoterol, 2 puff, Inhalation, BID - RT  cefTRIAXone, 2,000 mg, Intravenous, Q24H  enoxaparin, 40 mg, Subcutaneous, Q24H  hydrALAZINE, 25 mg, Oral, Q8H  insulin lispro, 2-7 Units, Subcutaneous, 4x Daily AC & at Bedtime  spironolactone, 100 mg, Oral, BID  tiotropium bromide monohydrate, 2 puff, Inhalation, Daily - RT      Infusions  Infusion Medications         Diet  Diet: Cardiac, Diabetic; Healthy Heart (2-3 Na+); Consistent Carbohydrate; Fluid Consistency: Thin (IDDSI 0)     I have personally reviewed:  [x]  Laboratory   [x]  Microbiology   [x]  Radiology   [x]  EKG/Telemetry  []  Cardiology/Vascular   []  Pathology    []  Records           Assessment/Plan             Active Hospital Problems     Diagnosis   POA    **Hypokalemia [E87.6]   Yes    Essential hypertension [I10]   Yes    Transaminitis [R74.01]   Yes    Severe malnutrition [E43]   Yes    Pulmonary nodules [R91.8]   Yes    Abnormal CT of the chest [R93.89]   Yes    Leukocytosis [D72.829]   Yes    Thrombocytopenia [D69.6]   Yes    CAP (community acquired pneumonia) [J18.9]   Yes    Type 2 diabetes mellitus with hyperglycemia, without long-term current use of insulin [E11.65]   Yes       Resolved Hospital Problems   No resolved problems to display.         64 y.o. female admitted with Hypokalemia.     Pulmonary nodules  Perihilar mass  Lymphadenopathy  Abnormal weight loss, tobacco use  - Patient with weight loss over past several months, coupled with ongoing tobacco use. CT Chest obtained, results have returned (09/20), impression suggesting primary left upper lobe malignancy with doreen (left hilar, mediastinal and left cervical), pulmonary, hepatic and osseous metastatic disease. Also noting multiple pulmonary nodules, and as above mass/adenopathy, tissue sampling recommended,  - Oncology and Pulmonology consulted, notes reviewed, plans for bronchoscopy likely Monday 09/23/24 with Pulmonology. Oncology planning for outpatient PET/CT. Will continue to follow their plans/recommendations. Greatly appreciate their help.     Pneumonia  Leukocytosis  - Findings of pneumonia noted on imaging, coupled with leukocytosis, elevated procalcitonin. UA is also abnormal, but patient without urinary complaints.  RVP/MRSA/strep/legionella negative, blood cultures no growth thus far. WBC remains elevated but overall improved. She is afebrile, will continue antibiotics as ordered for now. Continue to closely monitor respiratory status, as she did have documented sat <90% on 09/21 transiently, but most recent values have improved. She is in low 90s% at present on room air.     Elevated blood pressure without prior diagnosis of  hypertension, newly diagnosed hypertension  Hypokalemia  Metabolic alkalosis  - Elevated BP noted, patient not on antihypertensive medications previously, multiple elevations noted, will classify as newly diagnosed hypertension. Potassium low, metabolic alkalosis noted, concern for possible hyperaldosteronism.  - Aldosterone/renin pending. Nephrology consulted and following, Discussed with Dr. Castle, given that she has newly diagnosed hypertension, coupled with hypokalemia and metabolic alkalosis, strong suspicion for hyperaldosteronism. She has been started on Aldactone.   - Potassium has improved with repletion, alkalosis still noted.  - BP remains elevated, uncontrolled, Hydralazine being added today. Monitor response to this, may have to add additional agent or increase dose of current medications if no improvement noted.  - Will continue to follow Nephrology plans/recommendations. Greatly appreciate their help.     Type 2 diabetes mellitus with hyperglycemia  - Most recent A1c: 7.50%  - Current treatment regimen: SSI; Glucose mostly acceptable, does have some elevations outside goal.  - Add Glargine 2 units QHS. acceptable, Continue SSI as prescribed.  - Will monitor 24 hour insulin requirements and adjust as needed to achieve target inpatient range of 140-180.  - Diabetic diet     Thrombocytopenia  - Platelets found to be low on most recent labs. No indications for urgent intervention at present. Will monitor for now.  - Order repeat CBC in AM for reassessment.     COPD  - No evidence to suggest acute exacerbation.  Continue current medications as prescribed and closely monitor.  - Supplemental oxygen as needed to maintain O2 sats 88 to 92%, pulse oximetry, telemetry monitoring.     Transaminitis  - Noted on labs, worse from prior, but abdominal exam benign, CT showing abnormal findings involving liver, monitor for now with CMP. Consider further evaluation as clinically indicated.     SCDs for DVT  "prophylaxis.  Full code.  Discussed with patient, nursing staff, and consulting provider.  Anticipate discharge home timing yet to be determined.  Expected Discharge Date: 9/23/2024; Expected Discharge Time:         Royer Prajapati DO  [facility]  09/22/24                         Armand Castle MD at 09/22/24 1115             LOS: 3 days     Chief Complaint/ Reason for encounter: Hypertension, hyperaldosteronism    Subjective   09/21/24 : Patient is doing well today with no new complaints  Good appetite with no nausea or vomiting  No shortness of breath chest pain or edema  Voiding well with no dysuria    9/22: No new complaints or events, BP remains elevated but she is asymptomatic    Medical history reviewed:  History of Present Illness    Subjective    History taken from: Patient and chart    Vital Signs  Temp:  [97.9 °F (36.6 °C)-98.4 °F (36.9 °C)] 98.4 °F (36.9 °C)  Heart Rate:  [58-92] 84  Resp:  [18] 18  BP: (158-193)/() 193/94       Wt Readings from Last 1 Encounters:   09/22/24 0625 68.9 kg (151 lb 14.4 oz)   09/21/24 0632 69.6 kg (153 lb 8 oz)   09/20/24 0445 69.9 kg (154 lb 3.2 oz)   09/19/24 1944 69.9 kg (154 lb)       Objective:  Vital signs: (most recent): Blood pressure (!) 193/94, pulse 84, temperature 98.4 °F (36.9 °C), temperature source Oral, resp. rate 18, height 185.4 cm (72.99\"), weight 68.9 kg (151 lb 14.4 oz), SpO2 93%.                Objective:  General Appearance:  Comfortable, well-appearing, in no acute distress and not in pain.  Awake, alert  HEENT: Mucous membranes moist, no injury, oropharynx clear  Lungs:  Normal effort and normal respiratory rate.  Breath sounds clear to auscultation.  No  respiratory distress.  No rales, decreased breath sounds or rhonchi.    Heart: Normal rate.  Regular rhythm.  S1, S2 normal.  No murmur.   Abdomen: Abdomen is soft.  Bowel sounds are normal, no abdominal tenderness.  There is no rebound or guarding  Extremities: No " edema of bilateral lower extremities  Skin:  Warm and dry with no rashes      Results Review:    Intake/Output:   No intake or output data in the 24 hours ending 09/22/24 1115        DATA:  Radiology and Labs:  The following labs independently reviewed by me. Additional labs ordered for tomorrow a.m.  Interval notes, chart personally reviewed by me.   Old records independently reviewed showing longstanding smoking history, weight loss  Discussed with patient herself at bedside    Risk/ complexity of medical care/ medical decision making moderate complexity of the BP and electrolytes    Labs:   Recent Results (from the past 24 hour(s))   POC Glucose Once    Collection Time: 09/21/24  4:23 PM    Specimen: Blood   Result Value Ref Range    Glucose 160 (H) 70 - 130 mg/dL   Potassium    Collection Time: 09/21/24  5:05 PM    Specimen: Blood   Result Value Ref Range    Potassium 3.5 3.5 - 5.2 mmol/L   POC Glucose Once    Collection Time: 09/21/24  9:05 PM    Specimen: Blood   Result Value Ref Range    Glucose 187 (H) 70 - 130 mg/dL   Comprehensive Metabolic Panel    Collection Time: 09/22/24  2:58 AM    Specimen: Blood   Result Value Ref Range    Glucose 153 (H) 65 - 99 mg/dL    BUN 11 8 - 23 mg/dL    Creatinine 0.55 (L) 0.57 - 1.00 mg/dL    Sodium 136 136 - 145 mmol/L    Potassium 3.9 3.5 - 5.2 mmol/L    Chloride 95 (L) 98 - 107 mmol/L    CO2 31.2 (H) 22.0 - 29.0 mmol/L    Calcium 9.2 8.6 - 10.5 mg/dL    Total Protein 6.4 6.0 - 8.5 g/dL    Albumin 3.8 3.5 - 5.2 g/dL    ALT (SGPT) 45 (H) 1 - 33 U/L    AST (SGOT) 37 (H) 1 - 32 U/L    Alkaline Phosphatase 122 (H) 39 - 117 U/L    Total Bilirubin 0.6 0.0 - 1.2 mg/dL    Globulin 2.6 gm/dL    A/G Ratio 1.5 g/dL    BUN/Creatinine Ratio 20.0 7.0 - 25.0    Anion Gap 9.8 5.0 - 15.0 mmol/L    eGFR 102.5 >60.0 mL/min/1.73   CBC Auto Differential    Collection Time: 09/22/24  2:58 AM    Specimen: Blood   Result Value Ref Range    WBC 12.52 (H) 3.40 - 10.80 10*3/mm3    RBC 4.63 3.77 -  5.28 10*6/mm3    Hemoglobin 13.0 12.0 - 15.9 g/dL    Hematocrit 39.4 34.0 - 46.6 %    MCV 85.1 79.0 - 97.0 fL    MCH 28.1 26.6 - 33.0 pg    MCHC 33.0 31.5 - 35.7 g/dL    RDW 14.3 12.3 - 15.4 %    RDW-SD 44.4 37.0 - 54.0 fl    MPV 9.2 6.0 - 12.0 fL    Platelets 115 (L) 140 - 450 10*3/mm3    Neutrophil % 79.6 (H) 42.7 - 76.0 %    Lymphocyte % 10.1 (L) 19.6 - 45.3 %    Monocyte % 5.0 5.0 - 12.0 %    Eosinophil % 0.1 (L) 0.3 - 6.2 %    Basophil % 0.2 0.0 - 1.5 %    Immature Grans % 5.0 (H) 0.0 - 0.5 %    Neutrophils, Absolute 9.96 (H) 1.70 - 7.00 10*3/mm3    Lymphocytes, Absolute 1.26 0.70 - 3.10 10*3/mm3    Monocytes, Absolute 0.63 0.10 - 0.90 10*3/mm3    Eosinophils, Absolute 0.01 0.00 - 0.40 10*3/mm3    Basophils, Absolute 0.03 0.00 - 0.20 10*3/mm3    Immature Grans, Absolute 0.63 (H) 0.00 - 0.05 10*3/mm3    nRBC 0.0 0.0 - 0.2 /100 WBC   POC Glucose Once    Collection Time: 09/22/24  5:45 AM    Specimen: Blood   Result Value Ref Range    Glucose 143 (H) 70 - 130 mg/dL       Radiology:  Pertinent radiology studies were reviewed as described above      Medications have been reviewed separately in chart overview      ASSESSMENT:  Hypertension, new diagnosis, likely secondary to hyperaldosteronism    Hypokalemia    Leukocytosis    Thrombocytopenia    CAP (community acquired pneumonia)    Type 2 diabetes mellitus with hyperglycemia, without long-term current use of insulin    Pulmonary nodules  Probable metastatic lung cancer, needs pathology confirmation    Elevated blood pressure reading without diagnosis of hypertension           DISCUSSION/PLAN:   Blood pressure remains significantly elevated today despite spironolactone  Will increase frontal lactone to 100 mg twice daily  Add scheduled hydralazine  Potassium now within normal levels  Likely metastatic lung cancer with paraneoplastic syndrome  Await renin and aldosterone levels  Bronchoscopy with biopsy tomorrow morning        Armand Castle MD  [facility]   Office phone number: [phone]  Answering service phone number: [phone]    24  11:15 EDT    Dictation performed using Dragon dictation software      Electronically signed by Armand Castle MD at 24 1116       Royer Prajapati DO at 24 0942              Name: Kranthi Farley ADMIT: 2024   : 1960  PCP: Alexandra Keller PA-C    MRN: [id number] LOS: 3 days   AGE/SEX: 64 y.o. female  ROOM: [facility]     Subjective   Subjective   Patient awake and resting in bed, still with dyspnea, but relatively stable.       Objective   Objective   Vital Signs  Temp:  [97.9 °F (36.6 °C)-98.4 °F (36.9 °C)] 98.4 °F (36.9 °C)  Heart Rate:  [58-92] 84  Resp:  [18] 18  BP: (177-193)/() 193/94  SpO2:  [92 %-96 %] 93 %  on   ;   Device (Oxygen Therapy): room air  Body mass index is 20.05 kg/m².  Physical Exam  Vitals and nursing note reviewed.   Constitutional:       General: She is not in acute distress.     Appearance: She is ill-appearing.   Cardiovascular:      Rate and Rhythm: Normal rate and regular rhythm.      Pulses: Normal pulses.      Heart sounds: Normal heart sounds.   Pulmonary:      Effort: Pulmonary effort is normal. No respiratory distress.      Breath sounds: Decreased breath sounds present. No wheezing or rhonchi.   Abdominal:      General: Bowel sounds are normal.      Palpations: Abdomen is soft.      Tenderness: There is no abdominal tenderness.   Musculoskeletal:      Right lower leg: No edema.      Left lower leg: No edema.   Skin:     General: Skin is warm and dry.   Neurological:      Mental Status: She is alert.       Results Review     I reviewed the patient's new clinical results.  Results from last 7 days   Lab Units 24  0258 24  0351 24  1003 24   WBC 10*3/mm3 12.52* 14.19* 13.70* 17.19*   HEMOGLOBIN g/dL 13.0 12.6 12.3 14.2   PLATELETS 10*3/mm3 115* 106* 104* 109*     Results from last 7 days   Lab Units 24  0254  09/21/24  1705 09/21/24  0351 09/20/24  1003 09/19/24 2006   SODIUM mmol/L 136  --  141 142 142   POTASSIUM mmol/L 3.9 3.5 3.5 3.0* 2.3*   CHLORIDE mmol/L 95*  --  99 96* 92*   CO2 mmol/L 31.2*  --  34.3* 37.4* 39.1*   BUN mg/dL 11  --  11 18 19   CREATININE mg/dL 0.55*  --  0.50* 0.65 0.67   GLUCOSE mg/dL 153*  --  142* 166* 190*   EGFR mL/min/1.73 102.5  --  104.9 98.5 97.7     Results from last 7 days   Lab Units 09/22/24  0258 09/21/24  0351 09/19/24 2006   ALBUMIN g/dL 3.8 3.6 3.9   BILIRUBIN mg/dL 0.6 0.6 1.3*   ALK PHOS U/L 122* 121* 143*   AST (SGOT) U/L 37* 32 31   ALT (SGPT) U/L 45* 44* 46*     Results from last 7 days   Lab Units 09/22/24  0258 09/21/24  0351 09/20/24  1003 09/19/24 2006   CALCIUM mg/dL 9.2 8.9 9.0 9.6   ALBUMIN g/dL 3.8 3.6  --  3.9   MAGNESIUM mg/dL  --   --  2.0 2.0     Results from last 7 days   Lab Units 09/20/24  0001 09/19/24 2006   PROCALCITONIN ng/mL  --  1.75*   LACTATE mmol/L 1.2  --      Glucose   Date/Time Value Ref Range Status   09/22/2024 1121 204 (H) 70 - 130 mg/dL Final   09/22/2024 0545 143 (H) 70 - 130 mg/dL Final   09/21/2024 2105 187 (H) 70 - 130 mg/dL Final   09/21/2024 1623 160 (H) 70 - 130 mg/dL Final   09/21/2024 1004 168 (H) 70 - 130 mg/dL Final   09/21/2024 0608 164 (H) 70 - 130 mg/dL Final   09/20/2024 2024 175 (H) 70 - 130 mg/dL Final       MRI Brain With & Without Contrast    Result Date: 9/21/2024  1. No acute intracranial abnormality.   No abnormal enhancement.   This report was finalized on 9/21/2024 5:25 AM by Dr. Felicitas Cosme M.D on Workstation: [id number]       I have personally reviewed all medications:  Scheduled Medications  budesonide-formoterol, 2 puff, Inhalation, BID - RT  cefTRIAXone, 2,000 mg, Intravenous, Q24H  enoxaparin, 40 mg, Subcutaneous, Q24H  hydrALAZINE, 25 mg, Oral, Q8H  insulin lispro, 2-7 Units, Subcutaneous, 4x Daily AC & at Bedtime  spironolactone, 100 mg, Oral, BID  tiotropium bromide monohydrate, 2 puff, Inhalation,  Daily - RT    Infusions     Diet  Diet: Cardiac, Diabetic; Healthy Heart (2-3 Na+); Consistent Carbohydrate; Fluid Consistency: Thin (IDDSI 0)    I have personally reviewed:  [x]  Laboratory   [x]  Microbiology   [x]  Radiology   [x]  EKG/Telemetry  []  Cardiology/Vascular   []  Pathology    []  Records      Assessment/Plan     Active Hospital Problems    Diagnosis  POA    **Hypokalemia [E87.6]  Yes    Essential hypertension [I10]  Yes    Transaminitis [R74.01]  Yes    Severe malnutrition [E43]  Yes    Pulmonary nodules [R91.8]  Yes    Abnormal CT of the chest [R93.89]  Yes    Leukocytosis [D72.829]  Yes    Thrombocytopenia [D69.6]  Yes    CAP (community acquired pneumonia) [J18.9]  Yes    Type 2 diabetes mellitus with hyperglycemia, without long-term current use of insulin [E11.65]  Yes      Resolved Hospital Problems   No resolved problems to display.       64 y.o. female admitted with Hypokalemia.    Pulmonary nodules  Perihilar mass  Lymphadenopathy  Abnormal weight loss, tobacco use  - Patient with weight loss over past several months, coupled with ongoing tobacco use. CT Chest obtained, results have returned (09/20), impression suggesting primary left upper lobe malignancy with doreen (left hilar, mediastinal and left cervical), pulmonary, hepatic and osseous metastatic disease. Also noting multiple pulmonary nodules, and as above mass/adenopathy, tissue sampling recommended,  - Oncology and Pulmonology consulted, notes reviewed, plans for bronchoscopy likely Monday 09/23/24 with Pulmonology. Oncology planning for outpatient PET/CT. Will continue to follow their plans/recommendations. Greatly appreciate their help.    Pneumonia  Leukocytosis  - Findings of pneumonia noted on imaging, coupled with leukocytosis, elevated procalcitonin. UA is also abnormal, but patient without urinary complaints.  RVP/MRSA/strep/legionella negative, blood cultures no growth thus far. WBC remains elevated but overall improved. She  is afebrile, will continue antibiotics as ordered for now. Continue to closely monitor respiratory status, as she did have documented sat <90% on 09/21 transiently, but most recent values have improved. She is in low 90s% at present on room air.    Elevated blood pressure without prior diagnosis of hypertension, newly diagnosed hypertension  Hypokalemia  Metabolic alkalosis  - Elevated BP noted, patient not on antihypertensive medications previously, multiple elevations noted, will classify as newly diagnosed hypertension. Potassium low, metabolic alkalosis noted, concern for possible hyperaldosteronism.  - Aldosterone/renin pending. Nephrology consulted and following, Discussed with Dr. Castle, given that she has newly diagnosed hypertension, coupled with hypokalemia and metabolic alkalosis, strong suspicion for hyperaldosteronism. She has been started on Aldactone.   - Potassium has improved with repletion, alkalosis still noted.  - BP remains elevated, uncontrolled, Hydralazine being added today. Monitor response to this, may have to add additional agent or increase dose of current medications if no improvement noted.  - Will continue to follow Nephrology plans/recommendations. Greatly appreciate their help.    Type 2 diabetes mellitus with hyperglycemia  - Most recent A1c: 7.50%  - Current treatment regimen: SSI; Glucose mostly acceptable, does have some elevations outside goal.  - Add Glargine 2 units QHS. acceptable, Continue SSI as prescribed.  - Will monitor 24 hour insulin requirements and adjust as needed to achieve target inpatient range of 140-180.  - Diabetic diet    Thrombocytopenia  - Platelets found to be low on most recent labs. No indications for urgent intervention at present. Will monitor for now.  - Order repeat CBC in AM for reassessment.    COPD  - No evidence to suggest acute exacerbation.  Continue current medications as prescribed and closely monitor.  - Supplemental oxygen as needed to  maintain O2 sats 88 to 92%, pulse oximetry, telemetry monitoring.    Transaminitis  - Noted on labs, worse from prior, but abdominal exam benign, CT showing abnormal findings involving liver, monitor for now with CMP. Consider further evaluation as clinically indicated.    SCDs for DVT prophylaxis.  Full code.  Discussed with patient, nursing staff, and consulting provider.  Anticipate discharge home timing yet to be determined.  Expected Discharge Date: 9/23/2024; Expected Discharge Time:       Royer Prajapati DO  [facility]  09/22/24        Electronically signed by Royer Prajapati DO at 09/22/24 1348       Josr Gutierrez MD at 09/22/24 0938            Subjective     HISTORY OF PRESENT ILLNESS:   No acute issues overnight.  Patient sitting comfortably in bed.  Blood pressure remains elevated.  Patient denies any headaches, chest pain or lightheadedness.    Past Medical History, Past Surgical History, Social History, Family History have been reviewed and are without significant changes except as mentioned.    Review of Systems   A comprehensive 14 point review of systems was performed and was negative except as mentioned.    Medications:  The current medication list was reviewed in the EMR    ALLERGIES:  No Known Allergies    Objective      Vitals:    09/22/24 0611 09/22/24 0620 09/22/24 0625 09/22/24 0740   BP:    (!) 193/94   BP Location:    Right arm   Patient Position:    Lying   Pulse: 63   84   Resp:    18   Temp:    98.4 °F (36.9 °C)   TempSrc:    Oral   SpO2: 93% 93%  93%   Weight:   68.9 kg (151 lb 14.4 oz)    Height:              No data to display                Physical Exam    CONSTITUTIONAL:  Vital signs reviewed.  No distress, looks comfortable.  EYES:  Conjunctiva and lids unremarkable.    EARS,NOSE,MOUTH,THROAT:  Ears and nose appear unremarkable.    RESPIRATORY:  Normal respiratory effort.    CARDIOVASCULAR:  Normal heart rate  GASTROINTESTINAL: Abdomen appears unremarkable.   Nondistended   LYMPHATIC:  No cervical, supraclavicular lymphadenopathy.  SKIN:  Warm.  No rashes.  PSYCHIATRIC:  Normal judgment and insight.  Normal mood and affect.  NEURO: AAOx3, no obvious focal deficits.      RECENT LABS:  Hematology WBC   Date Value Ref Range Status   09/22/2024 12.52 (H) 3.40 - 10.80 10*3/mm3 Final     RBC   Date Value Ref Range Status   09/22/2024 4.63 3.77 - 5.28 10*6/mm3 Final     Hemoglobin   Date Value Ref Range Status   09/22/2024 13.0 12.0 - 15.9 g/dL Final     Hematocrit   Date Value Ref Range Status   09/22/2024 39.4 34.0 - 46.6 % Final     Platelets   Date Value Ref Range Status   09/22/2024 115 (L) 140 - 450 10*3/mm3 Final              Assessment & Plan   Patient is a pleasant 64-year-old female with medical history significant for COPD and history of SIADH/hyponatremia admitted with hypokalemia and weight loss.  Hematology/oncology consulted for suspected lung malignancy.       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    Has > 40-pack-year smoking history  The CT C/A/P demonstrated left perihilar mass, 3.6 x 2.1 cm with significant left hilar and mediastinal lymphadenopathy, prevascular doreen conglomerate measuring 4.3 x 2.2 cm.  Left hilar lymphadenopathy causes severe narrowing of the IRIS proximal segmental bronchi.  Numerous (greater than 20) hypoenhancing bilobar hepatic lesions, largest 2.4 cm in hepatic segment 4A.  Normal bilateral adrenal glands.  No abdominal or pelvic lymphadenopathy.  MRI brain negative for metastasis  9/20/2024: I reviewed the imaging and laboratory findings with patient in detail.  Informed her that they are highly concerning for primary lung malignancy.  Plan made to consult pulmonology for bronchoscopy and biopsy of the hilar/mediastinal lymph nodes.  9/22/24: Seen by pulmonology.  Plan for  bronchoscopy and biopsy on Monday, 9/23.  I again reviewed her imaging findings and tried calling her daughter to go over the management plan.  Call went unanswered.  Informed patient that we can try calling her again tomorrow.  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  Nephrology following     # ?  Obstructive pneumonia: On antibiotics per primary    # Hypertensive urgency:  SBP in 190s.  Suspect primary hyperaldosteronism related  Started on spironolactone and hydralazine.  Nephrology managing    # Mild thrombocytopenia: Monitor     Recommendations:  -Pulmonology planning bronchoscopy and biopsy on Monday  -Spironolactone and hydralazine per nephrology  -Plan for outpatient PET/CT  -Will continue to follow    I spent 52 minutes on this encounter, before, during & after the visit evaluating the patient, reviewing records and writing orders.          Electronically signed by Josr Gutierrez MD at 09/22/24 1214          Consult Notes (last 48 hours)        Socorro Lozano at 09/23/24 1000        Consult Orders    1. Inpatient Spiritual Care Consult [[id number]] ordered by Awais Dill MD at 09/23/24 0612                 Patient requested a visit.  This really wasn't a spiritual care visit, but more a hospitality visit.  She talked about her possibly haven't lung cancer.  She was getting ready for a test.  Family was with her, providing lots of support.  She was not affiliated with any [employer] group.  I will attempt to follow up.     Electronically signed by Socorro Lozano at 09/23/24 1234

## 2024-09-23 NOTE — PROGRESS NOTES
"   LOS: 4 days     Chief Complaint/ Reason for encounter: Hypertension, hyperaldosteronism    Subjective   09/21/24 : Patient is doing well today with no new complaints  Good appetite with no nausea or vomiting  No shortness of breath chest pain or edema  Voiding well with no dysuria    9/22: No new complaints or events, BP remains elevated but she is asymptomatic    9/23: Scheduled for bronchoscopy after potassium replacement  No new complaints today, n.p.o. for the procedure    Medical history reviewed:  History of Present Illness    Subjective    History taken from: Patient and chart    Vital Signs  Temp:  [97.3 °F (36.3 °C)-98.2 °F (36.8 °C)] 97.3 °F (36.3 °C)  Heart Rate:  [] 90  Resp:  [16-18] 16  BP: (184-199)/() 194/99       Wt Readings from Last 1 Encounters:   09/23/24 0330 68.5 kg (151 lb)   09/22/24 0625 68.9 kg (151 lb 14.4 oz)   09/21/24 0632 69.6 kg (153 lb 8 oz)   09/20/24 0445 69.9 kg (154 lb 3.2 oz)   09/19/24 1944 69.9 kg (154 lb)       Objective:  Vital signs: (most recent): Blood pressure (!) 194/99, pulse 90, temperature 97.3 °F (36.3 °C), temperature source Oral, resp. rate 16, height 185.4 cm (72.99\"), weight 68.5 kg (151 lb), SpO2 95%.                Objective:  General Appearance:  Comfortable, well-appearing, in no acute distress and not in pain.  Awake, alert  HEENT: Mucous membranes moist, no injury, oropharynx clear  Lungs:  Normal effort and normal respiratory rate.  Breath sounds clear to auscultation.  No  respiratory distress.  No rales, decreased breath sounds or rhonchi.    Heart: Normal rate.  Regular rhythm.  S1, S2 normal.  No murmur.   Abdomen: Abdomen is soft.  Bowel sounds are normal, no abdominal tenderness.  There is no rebound or guarding  Extremities: No edema of bilateral lower extremities  Skin:  Warm and dry with no rashes      Results Review:    Intake/Output:   No intake or output data in the 24 hours ending 09/23/24 1243        DATA:  Radiology and " Labs:  The following labs independently reviewed by me. Additional labs ordered for tomorrow a.m.  Interval notes, chart personally reviewed by me.   Old records independently reviewed showing longstanding smoking history, weight loss  Discussed with patient herself at bedside    Risk/ complexity of medical care/ medical decision making moderate complexity of the BP and electrolytes    Labs:   Recent Results (from the past 24 hour(s))   POC Glucose Once    Collection Time: 09/22/24  4:38 PM    Specimen: Blood   Result Value Ref Range    Glucose 129 70 - 130 mg/dL   POC Glucose Once    Collection Time: 09/22/24  9:32 PM    Specimen: Blood   Result Value Ref Range    Glucose 270 (H) 70 - 130 mg/dL   Comprehensive Metabolic Panel    Collection Time: 09/23/24  4:48 AM    Specimen: Blood   Result Value Ref Range    Glucose 129 (H) 65 - 99 mg/dL    BUN 16 8 - 23 mg/dL    Creatinine 0.55 (L) 0.57 - 1.00 mg/dL    Sodium 134 (L) 136 - 145 mmol/L    Potassium 2.8 (L) 3.5 - 5.2 mmol/L    Chloride 92 (L) 98 - 107 mmol/L    CO2 31.0 (H) 22.0 - 29.0 mmol/L    Calcium 9.1 8.6 - 10.5 mg/dL    Total Protein 6.2 6.0 - 8.5 g/dL    Albumin 3.7 3.5 - 5.2 g/dL    ALT (SGPT) 41 (H) 1 - 33 U/L    AST (SGOT) 34 (H) 1 - 32 U/L    Alkaline Phosphatase 129 (H) 39 - 117 U/L    Total Bilirubin 0.6 0.0 - 1.2 mg/dL    Globulin 2.5 gm/dL    A/G Ratio 1.5 g/dL    BUN/Creatinine Ratio 29.1 (H) 7.0 - 25.0    Anion Gap 11.0 5.0 - 15.0 mmol/L    eGFR 102.5 >60.0 mL/min/1.73   CBC Auto Differential    Collection Time: 09/23/24  4:48 AM    Specimen: Blood   Result Value Ref Range    WBC 13.14 (H) 3.40 - 10.80 10*3/mm3    RBC 4.56 3.77 - 5.28 10*6/mm3    Hemoglobin 13.0 12.0 - 15.9 g/dL    Hematocrit 38.1 34.0 - 46.6 %    MCV 83.6 79.0 - 97.0 fL    MCH 28.5 26.6 - 33.0 pg    MCHC 34.1 31.5 - 35.7 g/dL    RDW 14.5 12.3 - 15.4 %    RDW-SD 43.8 37.0 - 54.0 fl    MPV 9.1 6.0 - 12.0 fL    Platelets 103 (L) 140 - 450 10*3/mm3    nRBC 0.0 0.0 - 0.2 /100 WBC    Manual Differential    Collection Time: 09/23/24  4:48 AM    Specimen: Blood   Result Value Ref Range    Neutrophil % 86.5 (H) 42.7 - 76.0 %    Lymphocyte % 5.2 (L) 19.6 - 45.3 %    Monocyte % 5.2 5.0 - 12.0 %    Eosinophil % 0.0 (L) 0.3 - 6.2 %    Basophil % 0.0 0.0 - 1.5 %    Metamyelocyte % 1.0 (H) 0.0 - 0.0 %    Myelocyte % 2.1 (H) 0.0 - 0.0 %    Neutrophils Absolute 11.37 (H) 1.70 - 7.00 10*3/mm3    Lymphocytes Absolute 0.68 (L) 0.70 - 3.10 10*3/mm3    Monocytes Absolute 0.68 0.10 - 0.90 10*3/mm3    Eosinophils Absolute 0.00 0.00 - 0.40 10*3/mm3    Basophils Absolute 0.00 0.00 - 0.20 10*3/mm3    Hypochromia Mod/2+ None Seen    WBC Morphology Normal Normal    Platelet Morphology Normal Normal   POC Glucose Once    Collection Time: 09/23/24  5:44 AM    Specimen: Blood   Result Value Ref Range    Glucose 141 (H) 70 - 130 mg/dL   POC Glucose Once    Collection Time: 09/23/24 11:21 AM    Specimen: Blood   Result Value Ref Range    Glucose 123 70 - 130 mg/dL       Radiology:  Pertinent radiology studies were reviewed as described above      Medications have been reviewed separately in chart overview      ASSESSMENT:  Hypertension, new diagnosis, likely secondary to hyperaldosteronism    Hypokalemia    Leukocytosis    Thrombocytopenia    CAP (community acquired pneumonia)    Type 2 diabetes mellitus with hyperglycemia, without long-term current use of insulin    Pulmonary nodules  Probable metastatic lung cancer, needs pathology confirmation    Elevated blood pressure reading without diagnosis of hypertension           DISCUSSION/PLAN:   Blood pressure remains significantly elevated today despite spironolactone  Spironolactone has a long half-life and can take 4 to 5 days for full effect, dose was just titrated on 9/21  Continue spironolactone at current dosage  Increase hydralazine to 50 mg 3 times daily  Continue potassium replacement    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  Discussed with family at bedside        Armand Castle MD  [facility]   Office phone number: [phone]  Answering service phone number: [phone]    09/23/24  12:43 EDT    Dictation performed using Dragon dictation software

## 2024-09-23 NOTE — PLAN OF CARE
Problem: Adult Inpatient Plan of Care  Goal: Plan of Care Review  Outcome: Ongoing, Progressing  Flowsheets (Taken 9/23/2024 0221)  Progress: no change  Plan of Care Reviewed With: patient  Outcome Evaluation: No s/sx of distress noted at this time. Rested some throughout night. IV antibiotic cont per orders. NPO since midnight for bronch in AM. Will cont with current plan of care.  Goal: Patient-Specific Goal (Individualized)  Outcome: Ongoing, Progressing  Goal: Absence of Hospital-Acquired Illness or Injury  Outcome: Ongoing, Progressing  Intervention: Identify and Manage Fall Risk  Recent Flowsheet Documentation  Taken 9/23/2024 0211 by Chrystal Oglesby, RN  Safety Promotion/Fall Prevention:   activity supervised   assistive device/personal items within reach   clutter free environment maintained   fall prevention program maintained   lighting adjusted   nonskid shoes/slippers when out of bed   room organization consistent   safety round/check completed  Taken 9/23/2024 0025 by Chrystal Oglesby, RN  Safety Promotion/Fall Prevention:   activity supervised   assistive device/personal items within reach   clutter free environment maintained   nonskid shoes/slippers when out of bed   room organization consistent   safety round/check completed  Taken 9/22/2024 2221 by Chrystal Oglesby, RN  Safety Promotion/Fall Prevention:   activity supervised   assistive device/personal items within reach   clutter free environment maintained   fall prevention program maintained   lighting adjusted   nonskid shoes/slippers when out of bed   room organization consistent   safety round/check completed  Taken 9/22/2024 2022 by Chrystal Oglesby, RN  Safety Promotion/Fall Prevention:   activity supervised   assistive device/personal items within reach   clutter free environment maintained   lighting adjusted   nonskid shoes/slippers when out of bed   room organization consistent   safety round/check completed  Intervention: Prevent Skin  Injury  Recent Flowsheet Documentation  Taken 9/23/2024 0211 by Chrystal Oglesby RN  Body Position: position changed independently  Taken 9/23/2024 0025 by Chrystal Oglesby RN  Body Position:   position changed independently   supine  Taken 9/22/2024 2221 by Chrystal Oglesby RN  Body Position:   position changed independently   dangle, side of bed  Taken 9/22/2024 2022 by Chrystal Oglesby RN  Body Position:   position changed independently   supine  Skin Protection:   adhesive use limited   skin-to-skin areas padded   tubing/devices free from skin contact   transparent dressing maintained  Intervention: Prevent and Manage VTE (Venous Thromboembolism) Risk  Recent Flowsheet Documentation  Taken 9/23/2024 0211 by Chrystal Oglesby RN  Activity Management: up ad joseph  Taken 9/22/2024 2221 by Chrystal Oglesby RN  Activity Management: up ad joseph  Taken 9/22/2024 2022 by Chrystal Oglesby RN  Activity Management: up ad joseph  Intervention: Prevent Infection  Recent Flowsheet Documentation  Taken 9/23/2024 0211 by Chrystal Oglesby RN  Infection Prevention:   hand hygiene promoted   rest/sleep promoted   single patient room provided  Taken 9/23/2024 0025 by Chrystal Oglesby RN  Infection Prevention:   hand hygiene promoted   rest/sleep promoted   single patient room provided  Taken 9/22/2024 2221 by Chrystal Oglesby RN  Infection Prevention:   hand hygiene promoted   rest/sleep promoted   single patient room provided  Taken 9/22/2024 2022 by Chrystal Oglesby RN  Infection Prevention:   hand hygiene promoted   rest/sleep promoted   single patient room provided  Goal: Optimal Comfort and Wellbeing  Outcome: Ongoing, Progressing  Intervention: Provide Person-Centered Care  Recent Flowsheet Documentation  Taken 9/22/2024 2022 by Chrystal Oglesby RN  Trust Relationship/Rapport:   care explained   reassurance provided   thoughts/feelings acknowledged  Goal: Readiness for Transition of Care  Outcome: Ongoing, Progressing     Problem: Electrolyte Imbalance  Goal:  Electrolyte Balance  Outcome: Ongoing, Progressing     Problem: Malnutrition  Goal: Improved Nutritional Intake  Outcome: Ongoing, Progressing     Problem: Fall Injury Risk  Goal: Absence of Fall and Fall-Related Injury  Outcome: Ongoing, Progressing  Intervention: Identify and Manage Contributors  Recent Flowsheet Documentation  Taken 9/23/2024 0211 by Chrystal Oglesby RN  Medication Review/Management: dosing adjusted  Taken 9/23/2024 0025 by Chrystal Oglesby, RN  Medication Review/Management: medications reviewed  Taken 9/22/2024 2221 by Chrystal Oglesby RN  Medication Review/Management: medications reviewed  Taken 9/22/2024 2022 by Chrystal Oglesby RN  Medication Review/Management: medications reviewed  Intervention: Promote Injury-Free Environment  Recent Flowsheet Documentation  Taken 9/23/2024 0211 by Chrystal Oglesby RN  Safety Promotion/Fall Prevention:   activity supervised   assistive device/personal items within reach   clutter free environment maintained   fall prevention program maintained   lighting adjusted   nonskid shoes/slippers when out of bed   room organization consistent   safety round/check completed  Taken 9/23/2024 0025 by Chrystal Oglesby RN  Safety Promotion/Fall Prevention:   activity supervised   assistive device/personal items within reach   clutter free environment maintained   nonskid shoes/slippers when out of bed   room organization consistent   safety round/check completed  Taken 9/22/2024 2221 by Chrystal Oglesby RN  Safety Promotion/Fall Prevention:   activity supervised   assistive device/personal items within reach   clutter free environment maintained   fall prevention program maintained   lighting adjusted   nonskid shoes/slippers when out of bed   room organization consistent   safety round/check completed  Taken 9/22/2024 2022 by Chrystal Oglesby RN  Safety Promotion/Fall Prevention:   activity supervised   assistive device/personal items within reach   clutter free environment maintained    lighting adjusted   nonskid shoes/slippers when out of bed   room organization consistent   safety round/check completed   Goal Outcome Evaluation:  Plan of Care Reviewed With: patient        Progress: no change  Outcome Evaluation: No s/sx of distress noted at this time. Rested some throughout night. IV antibiotic cont per orders. NPO since midnight for bronch in AM. Will cont with current plan of care.

## 2024-09-23 NOTE — OP NOTE
Bronchoscopy Procedure Note    Procedure:  Bronchoscopy, Diagnostic  Endobronchial biopsy of left upper lobe  Bronchial aspirate  Endobronchial ultrasound and transbronchial needle aspiration biopsy of lymph node station 10L and 4L    Pre-Operative Diagnosis: Left lung mass    Post-Operative Diagnosis: Same    Indication: Left lung mass    Anesthesia: General Anesthesia    Procedure Details: Patient was consented for the procedure with all risk and benefit of the procedure explained in detail.  Patient was given the opportunity to ask questions and all concerns were answered.  The bronchocope was inserted into the main airway via the endotracheal tube. An anatomical survey was done of the main airways and the subsegmental bronchus to at least the first subsegmental level of all five lobes of both lungs.  The findings are reported below.      Findings:   Bronchoscope was passed through the endotracheal tube into the trachea.  Moderate secretions were present in the distal trachea and bilateral mainstem bronchi which were suctioned and bronchial aspirate was sent for cultures.  The right-sided airways were evaluated to at least the first subsegmental level and were of normal size and caliber without any endobronchial lesions seen.  Next the left-sided airways were evaluated.  Left lingula and lower lobes were patent without any lesions noted.  Left upper lobe was completely occluded with abnormal friable overlying tissue.  Endobronchial biopsy was obtained of the left upper lobe orifice.  JAZMINE demonstrated atypical cells.  Initial plan was for Ion robotic bronchoscopy however unable to access the left upper lobe for more peripheral lesions.  Endobronchial ultrasound was introduced and mediastinal survey was performed.  Lymph nodes at station 4L, 10L, 11L/mass were enlarged and transbronchial needle aspiration biopsies were obtained.  JAZMINE at 11L demonstrated malignant cells.  Mild bleeding was noted at the left upper  lobe site.  Bleeding was controlled with instillation of cold saline.  Patient tolerated procedure well.  Sent to PACU postprocedure.    Estimated Blood Loss:  Minimal           Specimens:    Bronchial aspirate sent for cultures  Endobronchial biopsy of left upper lobe sent for pathology  Transbronchial needle aspiration biopsy of lymph node station 4L, 10L, 11L sent for cytology and flow cytometry                Complications:  None; patient tolerated the procedure well.           Disposition: PACU - hemodynamically stable.    Patient tolerated the procedure well.    Govind Mar MD  9/23/2024  16:48 EDT

## 2024-09-23 NOTE — PROGRESS NOTES
HISTORY OF PRESENT ILLNESS:   The patient is a 64 y.o. year old female with medical history significant for extensive tobacco abuse (> 40 pack years), COPD and history of SIADH/hyponatremia had presented to [facility] ER 9/19/2024 with abnormal labs and weight loss of 40 pounds.  Patient had routine lab work performed through her PCP and directed to come to the ER.    Lab work in the ER noted severe hypokalemia (2.3), metabolic alkalosis (CO2 39.1, chloride 92), normal LFTs (total bilirubin 1.3, ALT 46, ), leukocytosis of 17.19 and thrombocytopenia of 129,000.  Normal sodium (142) and creatinine.  Patient was hypertensive with BP of 197/104.  Otherwise normal vitals.     A CT C/A/P demonstrated left perihilar mass, 3.6 x 2.1 cm with significant left hilar and mediastinal lymphadenopathy, prevascular doreen conglomerate measuring 4.3 x 2.2 cm.  Left hilar lymphadenopathy causes severe narrowing of the IRIS proximal segmental bronchi.  Numerous (greater than 20) hypoenhancing bilobar hepatic lesions, largest 2.4 cm in hepatic segment 4A.  Normal bilateral adrenal glands.  No abdominal or pelvic lymphadenopathy.     Hematology/oncology has been consulted for further evaluation and management seen 9/20/2024    Interval history:  9/22/2024  No acute issues overnight.  Patient sitting comfortably in bed.  Blood pressure remains elevated.  Patient denies any headaches, chest pain or lightheadedness.      9/23/2024  T97.3, pulse 90, respirations 16, /99  Bronchoscopy planned 9/23/2024-patient now being transported  H&H 13.0 and 30.1, white count of 13,140, platelet count 103,000, AST 34, ALT 41        Past Medical History, Past Surgical History, Social History, Family History have been reviewed and are without significant changes except as mentioned.    Review of Systems   A comprehensive 14 point review of systems was performed and was negative except as mentioned.    Medications:  The  current medication list was reviewed in the EMR    ALLERGIES:  No Known Allergies    Objective      Vitals:    09/23/24 0427 09/23/24 0601 09/23/24 0657 09/23/24 0700   BP:  (!) 188/78 (!) 194/99    BP Location:  Left arm Right arm    Patient Position:  Lying Lying    Pulse: 68 77 77 90   Resp:   16    Temp:   97.3 °F (36.3 °C)    TempSrc:   Oral    SpO2: 91% 94% 96% 95%   Weight:       Height:              No data to display                Physical Exam    CONSTITUTIONAL:  Vital signs reviewed.  No distress, looks comfortable.  EYES:  Conjunctiva and lids unremarkable.    EARS,NOSE,MOUTH,THROAT:  Ears and nose appear unremarkable.    RESPIRATORY:  Normal respiratory effort.    CARDIOVASCULAR:  Normal heart rate  GASTROINTESTINAL: Abdomen appears unremarkable.  Nondistended   LYMPHATIC:  No cervical, supraclavicular lymphadenopathy.  SKIN:  Warm.  No rashes.  PSYCHIATRIC:  Normal judgment and insight.  Normal mood and affect.  NEURO: AAOx3, no obvious focal deficits.      RECENT LABS:  Hematology WBC   Date Value Ref Range Status   09/23/2024 13.14 (H) 3.40 - 10.80 10*3/mm3 Final     RBC   Date Value Ref Range Status   09/23/2024 4.56 3.77 - 5.28 10*6/mm3 Final     Hemoglobin   Date Value Ref Range Status   09/23/2024 13.0 12.0 - 15.9 g/dL Final     Hematocrit   Date Value Ref Range Status   09/23/2024 38.1 34.0 - 46.6 % Final     Platelets   Date Value Ref Range Status   09/23/2024 103 (L) 140 - 450 10*3/mm3 Final              Assessment & Plan   Patient is a pleasant 64-year-old female with medical history significant for COPD and history of SIADH/hyponatremia admitted with hypokalemia and weight loss.  Hematology/oncology consulted for suspected lung malignancy.       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    Has > 40-pack-year smoking history  The CT  C/A/P demonstrated left perihilar mass, 3.6 x 2.1 cm with significant left hilar and mediastinal lymphadenopathy, prevascular doreen conglomerate measuring 4.3 x 2.2 cm.  Left hilar lymphadenopathy causes severe narrowing of the IRIS proximal segmental bronchi.  Numerous (greater than 20) hypoenhancing bilobar hepatic lesions, largest 2.4 cm in hepatic segment 4A.  Normal bilateral adrenal glands.  No abdominal or pelvic lymphadenopathy.  MRI brain negative for metastasis  9/20/2024: I reviewed the imaging and laboratory findings with patient in detail.  Informed her that they are highly concerning for primary lung malignancy.  Plan made to consult pulmonology for bronchoscopy and biopsy of the hilar/mediastinal lymph nodes.  9/22/24: Seen by pulmonology.  Plan for bronchoscopy and biopsy on Monday, 9/23.  I again reviewed her imaging findings and tried calling her daughter to go over the management plan.  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  Nephrology following     # ?  Obstructive pneumonia: On antibiotics per primary    # Hypertensive urgency:  SBP in 190s.  Suspect primary hyperaldosteronism related  Started on spironolactone and hydralazine.  Nephrology managing    # Mild thrombocytopenia: Monitor     Recommendations:  -Pulmonology planning bronchoscopy and biopsy 9/23/2024  -Spironolactone and hydralazine per nephrology  -Plan for outpatient PET/CT  -Will continue to follow    I spent 52 minutes on this encounter, before, during & after the visit evaluating the patient, reviewing records and writing orders.

## 2024-09-23 NOTE — CONSULTS
Patient requested a visit.  This really wasn't a spiritual care visit, but more a hospitality visit.  She talked about her possibly haven't lung cancer.  She was getting ready for a test.  Family was with her, providing lots of support.  She was not affiliated with any [employer] group.  I will attempt to follow up.

## 2024-09-23 NOTE — PROGRESS NOTES
Consult Daily Progress Note  [facility]   09/23/24      Patient Name:  Kranthi Farley  MRN:  [id number]   YOB: 1960  Age: 64 y.o.  Sex: female  LOS: 4    Reason for Consult:  Left upper lobe mass    Interval History:  No acute events overnight  Underwent bronchoscopy today  Symptoms remain stable  No chest pain or palpitations  No nausea vomiting  Respiratory symptoms stable    Physical Exam:  Vitals:    09/23/24 1615   BP: 163/92   Pulse: 99   Resp: 24   Temp:    SpO2: 93%       Intake/Output  No intake or output data in the 24 hours ending 09/23/24 1644    General: Alert, nontoxic, NAD  HEENT: NC/AT, EOMI, MMM  Neck: Supple, trachea midline  Cardiac: RRR, no murmur, gallops, rubs  Pulmonary: Diminished bilaterally  GI: Soft, non-tender, non-distended, normal bowel sounds  Extremities: Warm, well perfused, no LE edema  Skin: no visible rash  Neuro: CN II - XII grossly intact  Psychiatry: Normal mood and affect    Data Review:  Results from last 7 days   Lab Units 09/23/24 0448 09/22/24  0258 09/21/24  0351 09/20/24  1003 09/19/24 2006 09/19/24  0925   WBC 10*3/mm3 13.14* 12.52* 14.19* 13.70* 17.19* 19.98*   HEMOGLOBIN g/dL 13.0 13.0 12.6 12.3 14.2 14.6   PLATELETS 10*3/mm3 103* 115* 106* 104* 109* 129*     Results from last 7 days   Lab Units 09/23/24 0448 09/22/24  0258 09/21/24  1705 09/21/24  0351 09/20/24  1003 09/19/24 2006   SODIUM mmol/L 134* 136  --  141 142 142   POTASSIUM mmol/L 2.8* 3.9 3.5 3.5 3.0* 2.3*   CHLORIDE mmol/L 92* 95*  --  99 96* 92*   CO2 mmol/L 31.0* 31.2*  --  34.3* 37.4* 39.1*   BUN mg/dL 16 11  --  11 18 19   CREATININE mg/dL 0.55* 0.55*  --  0.50* 0.65 0.67   GLUCOSE mg/dL 129* 153*  --  142* 166* 190*   CALCIUM mg/dL 9.1 9.2  --  8.9 9.0 9.6   MAGNESIUM mg/dL  --   --   --   --  2.0 2.0   Estimated Creatinine Clearance: 111.7 mL/min (A) (by C-G formula based on SCr of 0.55 mg/dL (L)).    Results from last 7 days   Lab Units 09/23/24  7555  09/22/24  0258 09/21/24  0351 09/20/24  1003 09/20/24  0001 09/19/24 2006   AST (SGOT) U/L 34* 37* 32  --   --  31   ALT (SGPT) U/L 41* 45* 44*  --   --  46*   PROCALCITONIN ng/mL  --   --   --   --   --  1.75*   LACTATE mmol/L  --   --   --   --  1.2  --    PLATELETS 10*3/mm3 103* 115* 106*   < >  --  109*    < > = values in this interval not displayed.               Imaging:  Reviewed chest images personally from past 3 days    ASSESSMENT  /  PLAN:    Left perihilar mass  Mediastinal and hilar lymphadenopathy  Paraneoplastic syndrome, primary hyperaldosteronism  Postobstructive pneumonia  Electrolyte derangements (hypokalemia, hypochloremia)  Metabolic alkalosis  Primary hyperaldosteronism  Leukocytosis  Thrombocytopenia  Hypertension  Unintentional weight loss  History of tobacco abuse (greater than 40-pack-year)     -Patient presented with laboratory abnormalities concerning for paraneoplastic syndrome with CT imaging demonstrating left perihilar mass with possible superimposed obstructive pneumonia.  -High risk for malignancy especially with her significant smoking history and recent weight loss.  - Patient underwent bronchoscopy which demonstrated occlusion of the left upper lobe with endobronchial biopsies and EBUS/TBNA performed.  JAZMINE demonstrated atypical cells concerning for neuroendocrine versus lymphoma.  Final pathology pending.  -Complete course of empiric antibiotics (EOT 9/23)  -Continue bronchodilator therapy.  -MRI brain without any acute intracranial process.    Thank you for allowing us to participate in this patients care. Pulmonary will continue to follow.     Govind Mar MD  [facility]  Pulmonary and Critical Care Medicine, Interventional Pulmonology    Parts of this note may be an electronic transcription/translation of spoken language to printed text using the Dragon dictation system.

## 2024-09-23 NOTE — PROGRESS NOTES
Name: Kranthi Farley ADMIT: 2024   : 1960  PCP: Alexandra Keller PA-C    MRN: [id number] LOS: 4 days   AGE/SEX: 64 y.o. female  ROOM: ENDO/ENDO     Subjective   Subjective   Patient awake and resting in bed, still with dyspnea, family at bedside, plans for bronchoscopy today with Pulmonology.       Objective   Objective   Vital Signs  Temp:  [97.3 °F (36.3 °C)-98.2 °F (36.8 °C)] 97.3 °F (36.3 °C)  Heart Rate:  [] 75  Resp:  [16-18] 18  BP: (181-199)/() 181/108  SpO2:  [90 %-97 %] 94 %  on   ;   Device (Oxygen Therapy): room air  Body mass index is 19.93 kg/m².  Physical Exam  Vitals and nursing note reviewed.   Constitutional:       General: She is not in acute distress.     Appearance: She is ill-appearing.   Cardiovascular:      Rate and Rhythm: Normal rate and regular rhythm.      Pulses: Normal pulses.      Heart sounds: Normal heart sounds.   Pulmonary:      Effort: Pulmonary effort is normal. No respiratory distress.      Breath sounds: Decreased breath sounds present. No wheezing or rhonchi.   Abdominal:      General: Bowel sounds are normal. There is no distension.      Palpations: Abdomen is soft.      Tenderness: There is no abdominal tenderness.   Musculoskeletal:      Right lower leg: No edema.      Left lower leg: No edema.   Skin:     General: Skin is warm and dry.   Neurological:      Mental Status: She is alert.       Results Review     I reviewed the patient's new clinical results.  Results from last 7 days   Lab Units 24  0448 24  0258 24  0351 24  1003   WBC 10*3/mm3 13.14* 12.52* 14.19* 13.70*   HEMOGLOBIN g/dL 13.0 13.0 12.6 12.3   PLATELETS 10*3/mm3 103* 115* 106* 104*     Results from last 7 days   Lab Units 24  0448 24  0258 24  1705 24  0351 24  1003   SODIUM mmol/L 134* 136  --  141 142   POTASSIUM mmol/L 2.8* 3.9 3.5 3.5 3.0*   CHLORIDE mmol/L 92* 95*  --  99 96*   CO2 mmol/L 31.0* 31.2*  --  34.3* 37.4*    BUN mg/dL 16 11  --  11 18   CREATININE mg/dL 0.55* 0.55*  --  0.50* 0.65   GLUCOSE mg/dL 129* 153*  --  142* 166*   EGFR mL/min/1.73 102.5 102.5  --  104.9 98.5     Results from last 7 days   Lab Units 09/23/24 0448 09/22/24  0258 09/21/24  0351 09/19/24 2006   ALBUMIN g/dL 3.7 3.8 3.6 3.9   BILIRUBIN mg/dL 0.6 0.6 0.6 1.3*   ALK PHOS U/L 129* 122* 121* 143*   AST (SGOT) U/L 34* 37* 32 31   ALT (SGPT) U/L 41* 45* 44* 46*     Results from last 7 days   Lab Units 09/23/24 0448 09/22/24  0258 09/21/24  0351 09/20/24  1003 09/19/24 2006   CALCIUM mg/dL 9.1 9.2 8.9 9.0 9.6   ALBUMIN g/dL 3.7 3.8 3.6  --  3.9   MAGNESIUM mg/dL  --   --   --  2.0 2.0     Results from last 7 days   Lab Units 09/20/24  0001 09/19/24 2006   PROCALCITONIN ng/mL  --  1.75*   LACTATE mmol/L 1.2  --      Glucose   Date/Time Value Ref Range Status   09/23/2024 1121 123 70 - 130 mg/dL Final   09/23/2024 0544 141 (H) 70 - 130 mg/dL Final   09/22/2024 2132 270 (H) 70 - 130 mg/dL Final   09/22/2024 1638 129 70 - 130 mg/dL Final   09/22/2024 1121 204 (H) 70 - 130 mg/dL Final   09/22/2024 0545 143 (H) 70 - 130 mg/dL Final   09/21/2024 2105 187 (H) 70 - 130 mg/dL Final       No radiology results for the last day    I have personally reviewed all medications:  Scheduled Medications  amLODIPine, 5 mg, Oral, Q24H  budesonide-formoterol, 2 puff, Inhalation, BID - RT  cefTRIAXone, 2,000 mg, Intravenous, Q24H  [Held by provider] enoxaparin, 40 mg, Subcutaneous, Q24H  hydrALAZINE, 50 mg, Oral, Q8H  insulin glargine, 2 Units, Subcutaneous, Nightly  insulin lispro, 2-7 Units, Subcutaneous, 4x Daily AC & at Bedtime  potassium chloride, 10 mEq, Intravenous, Q1H  spironolactone, 100 mg, Oral, BID  tiotropium bromide monohydrate, 2 puff, Inhalation, Daily - RT    Infusions  sodium chloride, 1,000 mL      Diet  NPO Diet NPO Type: Strict NPO    I have personally reviewed:  [x]  Laboratory   []  Microbiology   [x]  Radiology   [x]  EKG/Telemetry  []   Cardiology/Vascular   []  Pathology    []  Records       Assessment/Plan     Active Hospital Problems    Diagnosis  POA    **Hypokalemia [E87.6]  Yes    Essential hypertension [I10]  Yes    Transaminitis [R74.01]  Yes    Severe malnutrition [E43]  Yes    Pulmonary nodules [R91.8]  Yes    Abnormal CT of the chest [R93.89]  Yes    Leukocytosis [D72.829]  Yes    Thrombocytopenia [D69.6]  Yes    CAP (community acquired pneumonia) [J18.9]  Yes    Type 2 diabetes mellitus with hyperglycemia, without long-term current use of insulin [E11.65]  Yes      Resolved Hospital Problems   No resolved problems to display.       64 y.o. female admitted with Hypokalemia.    Pulmonary nodules  Perihilar mass  Lymphadenopathy  Abnormal weight loss, tobacco use  - Patient with weight loss over past several months, coupled with ongoing tobacco use. CT Chest obtained, results have returned (09/20), impression suggesting primary left upper lobe malignancy with doreen (left hilar, mediastinal and left cervical), pulmonary, hepatic and osseous metastatic disease. Also noting multiple pulmonary nodules, and as above mass/adenopathy, tissue sampling recommended. MRI brain obtained, no acute intracranial abnormalities noted.  - Oncology and Pulmonology consulted, notes reviewed, plans for bronchoscopy today 09/23/24 with Pulmonology. Oncology planning for outpatient PET/CT. Will continue to follow their plans/recommendations. Greatly appreciate their help.    Pneumonia  Leukocytosis  - Findings of pneumonia noted on imaging, coupled with leukocytosis, elevated procalcitonin. UA is also abnormal, but patient without urinary complaints.  RVP/MRSA/strep/legionella negative, blood cultures no growth thus far. WBC remains elevated on most recent labs, not significantly changed from prior, but overall improved. She is afebrile, will continue antibiotics as ordered for now. Continue to closely monitor respiratory status.    Elevated blood pressure  without prior diagnosis of hypertension, newly diagnosed hypertension  Hypokalemia  Metabolic alkalosis  - Elevated BP noted, patient not on antihypertensive medications previously, multiple elevations noted, will classify as newly diagnosed hypertension. Potassium low, metabolic alkalosis noted, concern for possible hyperaldosteronism.  - Aldosterone/renin pending. Nephrology consulted and following, Discussed with Dr. Castle, given that she has newly diagnosed hypertension, coupled with hypokalemia and metabolic alkalosis, strong suspicion for hyperaldosteronism. She has been started on Aldactone, remains on this at present, tolerating well.   - Potassium low again today, will replete per protocol. Alkalosis still noted.  - BP remains elevated, uncontrolled, Hydralazine being increased by Nephrology, will also add Amlodipine 5 mg daily. Monitor response to this.  - Will continue to follow Nephrology plans/recommendations. Greatly appreciate their help.    Type 2 diabetes mellitus with hyperglycemia  - Most recent A1c: 7.50%  - Current treatment regimen: Glargine 2 units nightly, SSI; Glucose mostly acceptable.  - Continue medications as prescribed for now.  - Will monitor 24 hour insulin requirements and adjust as needed to achieve target inpatient range of 140-180.  - Diabetic diet    Thrombocytopenia  - Platelets found to be low on most recent labs, values relatively stable. No indications for urgent intervention at present. Will monitor for now.  - Order repeat CBC in AM for reassessment.    COPD  - No evidence to suggest acute exacerbation.  Continue current medications as prescribed and closely monitor.  - Supplemental oxygen as needed to maintain O2 sats 88 to 92%, pulse oximetry, telemetry monitoring.    Transaminitis  - Noted on labs, values remain elevated, but abdominal exam benign, CT showing abnormal findings involving liver, she does not appear to have any medications that are contributing. Monitor  for now with CMP. Consider further evaluation as clinically indicated.    SCDs for DVT prophylaxis.  Full code.  Discussed with patient, family, nursing staff, consulting provider, CCP, and care team on multidisciplinary rounds.  Anticipate discharge home timing yet to be determined.  Expected Discharge Date: 9/23/2024; Expected Discharge Time:       Royer Prajapati DO  [facility]  09/23/24

## 2024-09-24 LAB
ALBUMIN SERPL-MCNC: 3.5 G/DL (ref 3.5–5.2)
ALBUMIN/GLOB SERPL: 1.5 G/DL
ALDOST SERPL-MCNC: 1.2 NG/DL (ref 0–30)
ALP SERPL-CCNC: 109 U/L (ref 39–117)
ALT SERPL W P-5'-P-CCNC: 44 U/L (ref 1–33)
ANION GAP SERPL CALCULATED.3IONS-SCNC: 10 MMOL/L (ref 5–15)
AST SERPL-CCNC: 33 U/L (ref 1–32)
BACTERIA SPEC AEROBE CULT: NORMAL
BASOPHILS # BLD AUTO: 0.03 10*3/MM3 (ref 0–0.2)
BASOPHILS NFR BLD AUTO: 0.2 % (ref 0–1.5)
BILIRUB SERPL-MCNC: 0.5 MG/DL (ref 0–1.2)
BUN SERPL-MCNC: 14 MG/DL (ref 8–23)
BUN/CREAT SERPL: 32.6 (ref 7–25)
CALCIUM SPEC-SCNC: 8.9 MG/DL (ref 8.6–10.5)
CHLORIDE SERPL-SCNC: 96 MMOL/L (ref 98–107)
CO2 SERPL-SCNC: 28 MMOL/L (ref 22–29)
CREAT SERPL-MCNC: 0.43 MG/DL (ref 0.57–1)
DEPRECATED RDW RBC AUTO: 44 FL (ref 37–54)
EGFRCR SERPLBLD CKD-EPI 2021: 108.8 ML/MIN/1.73
EOSINOPHIL # BLD AUTO: 0.02 10*3/MM3 (ref 0–0.4)
EOSINOPHIL NFR BLD AUTO: 0.1 % (ref 0.3–6.2)
ERYTHROCYTE [DISTWIDTH] IN BLOOD BY AUTOMATED COUNT: 14.5 % (ref 12.3–15.4)
GLOBULIN UR ELPH-MCNC: 2.4 GM/DL
GLUCOSE BLDC GLUCOMTR-MCNC: 121 MG/DL (ref 70–130)
GLUCOSE BLDC GLUCOMTR-MCNC: 199 MG/DL (ref 70–130)
GLUCOSE BLDC GLUCOMTR-MCNC: 205 MG/DL (ref 70–130)
GLUCOSE BLDC GLUCOMTR-MCNC: 208 MG/DL (ref 70–130)
GLUCOSE SERPL-MCNC: 111 MG/DL (ref 65–99)
HCT VFR BLD AUTO: 37.5 % (ref 34–46.6)
HGB BLD-MCNC: 12.7 G/DL (ref 12–15.9)
IMM GRANULOCYTES # BLD AUTO: 0.71 10*3/MM3 (ref 0–0.05)
IMM GRANULOCYTES NFR BLD AUTO: 5 % (ref 0–0.5)
LYMPHOCYTES # BLD AUTO: 1.52 10*3/MM3 (ref 0.7–3.1)
LYMPHOCYTES NFR BLD AUTO: 10.8 % (ref 19.6–45.3)
MCH RBC QN AUTO: 28.4 PG (ref 26.6–33)
MCHC RBC AUTO-ENTMCNC: 33.9 G/DL (ref 31.5–35.7)
MCV RBC AUTO: 83.9 FL (ref 79–97)
MONOCYTES # BLD AUTO: 0.66 10*3/MM3 (ref 0.1–0.9)
MONOCYTES NFR BLD AUTO: 4.7 % (ref 5–12)
NEUTROPHILS NFR BLD AUTO: 11.12 10*3/MM3 (ref 1.7–7)
NEUTROPHILS NFR BLD AUTO: 79.2 % (ref 42.7–76)
NRBC BLD AUTO-RTO: 0 /100 WBC (ref 0–0.2)
PLATELET # BLD AUTO: 102 10*3/MM3 (ref 140–450)
PMV BLD AUTO: 8.7 FL (ref 6–12)
POTASSIUM SERPL-SCNC: 3.3 MMOL/L (ref 3.5–5.2)
POTASSIUM SERPL-SCNC: 3.5 MMOL/L (ref 3.5–5.2)
PROT SERPL-MCNC: 5.9 G/DL (ref 6–8.5)
QT INTERVAL: 346 MS
QTC INTERVAL: 451 MS
RBC # BLD AUTO: 4.47 10*6/MM3 (ref 3.77–5.28)
SODIUM SERPL-SCNC: 134 MMOL/L (ref 136–145)
WBC NRBC COR # BLD AUTO: 14.06 10*3/MM3 (ref 3.4–10.8)

## 2024-09-24 PROCEDURE — 25010000002 PIPERACILLIN SOD-TAZOBACTAM PER 1 G: Performed by: STUDENT IN AN ORGANIZED HEALTH CARE EDUCATION/TRAINING PROGRAM

## 2024-09-24 PROCEDURE — 85025 COMPLETE CBC W/AUTO DIFF WBC: CPT | Performed by: STUDENT IN AN ORGANIZED HEALTH CARE EDUCATION/TRAINING PROGRAM

## 2024-09-24 PROCEDURE — 82948 REAGENT STRIP/BLOOD GLUCOSE: CPT

## 2024-09-24 PROCEDURE — 94664 DEMO&/EVAL PT USE INHALER: CPT

## 2024-09-24 PROCEDURE — 93005 ELECTROCARDIOGRAM TRACING: CPT | Performed by: STUDENT IN AN ORGANIZED HEALTH CARE EDUCATION/TRAINING PROGRAM

## 2024-09-24 PROCEDURE — 63710000001 INSULIN GLARGINE PER 5 UNITS: Performed by: STUDENT IN AN ORGANIZED HEALTH CARE EDUCATION/TRAINING PROGRAM

## 2024-09-24 PROCEDURE — 94799 UNLISTED PULMONARY SVC/PX: CPT

## 2024-09-24 PROCEDURE — 63710000001 INSULIN LISPRO (HUMAN) PER 5 UNITS

## 2024-09-24 PROCEDURE — 80053 COMPREHEN METABOLIC PANEL: CPT | Performed by: STUDENT IN AN ORGANIZED HEALTH CARE EDUCATION/TRAINING PROGRAM

## 2024-09-24 PROCEDURE — 93010 ELECTROCARDIOGRAM REPORT: CPT | Performed by: INTERNAL MEDICINE

## 2024-09-24 PROCEDURE — 94761 N-INVAS EAR/PLS OXIMETRY MLT: CPT

## 2024-09-24 PROCEDURE — 25810000003 SODIUM CHLORIDE 0.9 % SOLUTION: Performed by: HOSPITALIST

## 2024-09-24 PROCEDURE — 99231 SBSQ HOSP IP/OBS SF/LOW 25: CPT | Performed by: INTERNAL MEDICINE

## 2024-09-24 PROCEDURE — 84132 ASSAY OF SERUM POTASSIUM: CPT | Performed by: STUDENT IN AN ORGANIZED HEALTH CARE EDUCATION/TRAINING PROGRAM

## 2024-09-24 RX ORDER — POTASSIUM CHLORIDE 750 MG/1
40 TABLET, FILM COATED, EXTENDED RELEASE ORAL EVERY 4 HOURS
Status: COMPLETED | OUTPATIENT
Start: 2024-09-24 | End: 2024-09-24

## 2024-09-24 RX ORDER — POTASSIUM CHLORIDE 750 MG/1
40 TABLET, FILM COATED, EXTENDED RELEASE ORAL EVERY 4 HOURS
Status: COMPLETED | OUTPATIENT
Start: 2024-09-24 | End: 2024-09-25

## 2024-09-24 RX ADMIN — SPIRONOLACTONE 100 MG: 100 TABLET ORAL at 09:16

## 2024-09-24 RX ADMIN — SPIRONOLACTONE 100 MG: 100 TABLET ORAL at 17:09

## 2024-09-24 RX ADMIN — IPRATROPIUM BROMIDE AND ALBUTEROL SULFATE 3 ML: .5; 3 SOLUTION RESPIRATORY (INHALATION) at 19:43

## 2024-09-24 RX ADMIN — POTASSIUM CHLORIDE 40 MEQ: 750 TABLET, EXTENDED RELEASE ORAL at 11:39

## 2024-09-24 RX ADMIN — INSULIN LISPRO 3 UNITS: 100 INJECTION, SOLUTION INTRAVENOUS; SUBCUTANEOUS at 21:07

## 2024-09-24 RX ADMIN — HYDRALAZINE HYDROCHLORIDE 50 MG: 25 TABLET ORAL at 21:07

## 2024-09-24 RX ADMIN — PIPERACILLIN AND TAZOBACTAM 3.38 G: 3; .375 INJECTION, POWDER, FOR SOLUTION INTRAVENOUS at 17:31

## 2024-09-24 RX ADMIN — IPRATROPIUM BROMIDE AND ALBUTEROL SULFATE 3 ML: .5; 3 SOLUTION RESPIRATORY (INHALATION) at 07:02

## 2024-09-24 RX ADMIN — INSULIN GLARGINE 2 UNITS: 100 INJECTION, SOLUTION SUBCUTANEOUS at 21:08

## 2024-09-24 RX ADMIN — INSULIN LISPRO 2 UNITS: 100 INJECTION, SOLUTION INTRAVENOUS; SUBCUTANEOUS at 11:39

## 2024-09-24 RX ADMIN — AMLODIPINE BESYLATE 5 MG: 5 TABLET ORAL at 09:17

## 2024-09-24 RX ADMIN — HYDRALAZINE HYDROCHLORIDE 50 MG: 25 TABLET ORAL at 14:15

## 2024-09-24 RX ADMIN — POTASSIUM CHLORIDE 40 MEQ: 750 TABLET, EXTENDED RELEASE ORAL at 14:15

## 2024-09-24 RX ADMIN — IPRATROPIUM BROMIDE AND ALBUTEROL SULFATE 3 ML: .5; 3 SOLUTION RESPIRATORY (INHALATION) at 10:40

## 2024-09-24 RX ADMIN — HYDRALAZINE HYDROCHLORIDE 50 MG: 25 TABLET ORAL at 05:19

## 2024-09-24 RX ADMIN — POTASSIUM CHLORIDE 40 MEQ: 750 TABLET, EXTENDED RELEASE ORAL at 20:58

## 2024-09-24 RX ADMIN — IPRATROPIUM BROMIDE AND ALBUTEROL SULFATE 3 ML: .5; 3 SOLUTION RESPIRATORY (INHALATION) at 14:55

## 2024-09-24 RX ADMIN — PIPERACILLIN AND TAZOBACTAM 3.38 G: 3; .375 INJECTION, POWDER, FOR SOLUTION INTRAVENOUS at 12:38

## 2024-09-24 RX ADMIN — SODIUM CHLORIDE 1000 ML: 9 INJECTION, SOLUTION INTRAVENOUS at 05:19

## 2024-09-24 RX ADMIN — INSULIN LISPRO 3 UNITS: 100 INJECTION, SOLUTION INTRAVENOUS; SUBCUTANEOUS at 17:09

## 2024-09-24 NOTE — PROGRESS NOTES
HISTORY OF PRESENT ILLNESS:   The patient is a 64 y.o. year old female with medical history significant for extensive tobacco abuse (> 40 pack years), COPD and history of SIADH/hyponatremia had presented to [facility] ER 9/19/2024 with abnormal labs and weight loss of 40 pounds.  Patient had routine lab work performed through her PCP and directed to come to the ER.    Lab work in the ER noted severe hypokalemia (2.3), metabolic alkalosis (CO2 39.1, chloride 92), normal LFTs (total bilirubin 1.3, ALT 46, ), leukocytosis of 17.19 and thrombocytopenia of 129,000.  Normal sodium (142) and creatinine.  Patient was hypertensive with BP of 197/104.  Otherwise normal vitals.     A CT C/A/P demonstrated left perihilar mass, 3.6 x 2.1 cm with significant left hilar and mediastinal lymphadenopathy, prevascular doreen conglomerate measuring 4.3 x 2.2 cm.  Left hilar lymphadenopathy causes severe narrowing of the IRIS proximal segmental bronchi.  Numerous (greater than 20) hypoenhancing bilobar hepatic lesions, largest 2.4 cm in hepatic segment 4A.  Normal bilateral adrenal glands.  No abdominal or pelvic lymphadenopathy.     Hematology/oncology has been consulted for further evaluation and management seen 9/20/2024    Interval history:  9/22/2024  No acute issues overnight.  Patient sitting comfortably in bed.  Blood pressure remains elevated.  Patient denies any headaches, chest pain or lightheadedness.      9/23/2024  T97.3, pulse 90, respirations 16, /99  Bronchoscopy planned 9/23/2024-patient now being transported  H&H 13.0 and 30.1, white count of 13,140, platelet count 103,000, AST 34, ALT 41    9/24/2024  T98.1, pulse 100, respirations 18, /66  Patient feeling about the same, no new symptoms.  H&H 12.7 and 37.5, white count of 14,060, platelet count 102,000, BUN/creatinine of 14 and 0.43, BUN 3.5, total protein 5.9, AST 33, ALT 44  Bronchoscopy 9/23/2024 without endobronchial lesions  seen, left lung and lower lobes patent without lesions, left upper lobe completely occluded with abnormal friable overlying tissue with biopsy obtained in JAZMINE demonstrated abnormal cells.  On bronchoscopy was unable to access the left lower lobe for more peripheral lesions, endobronchial ultrasound introduced and mediastinal survey with trans bronchial needle biopsies station 4L, 10L and 11L with JAZMINE demonstrating malignant cells at 11L.  This is an interlobar node,  Path is currently pending.  The patient lives in [facility] and we may have to find closer oncology care.      Past Medical History, Past Surgical History, Social History, Family History have been reviewed and are without significant changes except as mentioned.    Review of Systems   A comprehensive 14 point review of systems was performed and was negative except as mentioned.    Medications:  The current medication list was reviewed in the EMR    ALLERGIES:  No Known Allergies    Objective      Vitals:    09/24/24 0703 09/24/24 0725 09/24/24 0916 09/24/24 1040   BP:  134/58 142/66    BP Location:  Left arm     Patient Position:  Sitting     Pulse: 98 107 100 101   Resp: 20 20  18   Temp:  98.1 °F (36.7 °C)     TempSrc:  Oral     SpO2: 93% 94%  95%   Weight:       Height:              No data to display                Physical Exam    CONSTITUTIONAL:  Vital signs reviewed.  No distress, looks comfortable.  EYES:  Conjunctiva and lids unremarkable.    EARS,NOSE,MOUTH,THROAT:  Ears and nose appear unremarkable.    RESPIRATORY:  Normal respiratory effort.    CARDIOVASCULAR:  Normal heart rate  GASTROINTESTINAL: Abdomen appears unremarkable.  Nondistended   LYMPHATIC:  No cervical, supraclavicular lymphadenopathy.  SKIN:  Warm.  No rashes.  PSYCHIATRIC:  Normal judgment and insight.  Normal mood and affect.  NEURO: AAOx3, no obvious focal deficits.      RECENT LABS:  Hematology WBC   Date Value Ref Range Status   09/24/2024 14.06 (H) 3.40 -  10.80 10*3/mm3 Final     RBC   Date Value Ref Range Status   09/24/2024 4.47 3.77 - 5.28 10*6/mm3 Final     Hemoglobin   Date Value Ref Range Status   09/24/2024 12.7 12.0 - 15.9 g/dL Final     Hematocrit   Date Value Ref Range Status   09/24/2024 37.5 34.0 - 46.6 % Final     Platelets   Date Value Ref Range Status   09/24/2024 102 (L) 140 - 450 10*3/mm3 Final              Assessment & Plan   Patient is a pleasant 64-year-old female with medical history significant for COPD and history of SIADH/hyponatremia admitted with hypokalemia and weight loss.  Hematology/oncology consulted for suspected lung malignancy.       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    Has > 40-pack-year smoking history  The CT C/A/P demonstrated left perihilar mass, 3.6 x 2.1 cm with significant left hilar and mediastinal lymphadenopathy, prevascular doreen conglomerate measuring 4.3 x 2.2 cm.  Left hilar lymphadenopathy causes severe narrowing of the IRIS proximal segmental bronchi.  Numerous (greater than 20) hypoenhancing bilobar hepatic lesions, largest 2.4 cm in hepatic segment 4A.  Normal bilateral adrenal glands.  No abdominal or pelvic lymphadenopathy.  MRI brain negative for metastasis  9/20/2024: I reviewed the imaging and laboratory findings with patient in detail.  Informed her that they are highly concerning for primary lung malignancy.  Plan made to consult pulmonology for bronchoscopy and biopsy of the hilar/mediastinal lymph nodes.  9/22/24: Seen by pulmonology.  Plan for bronchoscopy and biopsy on Monday, 9/23.  I again reviewed her imaging findings and tried calling her daughter to go over the management plan.  Further plan based on pathologic findings.  The patient lives in [facility] and has difficulty in transportation- disabled .     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  Nephrology following     # ?  Obstructive pneumonia: On antibiotics per primary    # Hypertensive urgency:  SBP in 190s.  Suspect primary hyperaldosteronism related  Started on spironolactone and hydralazine.  Nephrology managing    # Mild thrombocytopenia: Monitor     Recommendations:  -Pulmonology bronchoscopy and biopsy 9/23/2024 pending  -Spironolactone and hydralazine per nephrology  -Plan for outpatient PET/CT  -Will continue to follow but will discuss with patient about available oncology care closer to home and possibly make this referral.

## 2024-09-24 NOTE — PROGRESS NOTES
"Nutrition Services    Patient Name:  Kranthi Farley  YOB: 1960  MRN: [id number]  Admit Date:  2024    Assessment Date:  24    Summary: Follow up    Pt reports fair appetite. Eating % per chart review. Boost glucose control BID ordered. C/o not getting the flavor that she likes. Only likes vanilla flavor. Pt voiced not being sure how to eat home for DM2. Provided DM2 handout and tried to go over DM2 diet education with the pt.     Labs: Na 134, K 3.3, glu 121/111/199/205, AST 33, ALT 44  Meds: insulin, spironolactone    Plan/recs:  Continue boost plus BID - vanilla only  Encourage good PO intake    RD to follow    CLINICAL NUTRITION ASSESSMENT      Reason for Assessment Follow-up Protocol     Diagnosis/Problem   Hypokalemia, newly dx T2DM   Medical/Surgical History Past Medical History:   Diagnosis Date    Cancer     SKIN REMOVED    Cholesteatoma     BILATERAL EARS    PONV (postoperative nausea and vomiting)        Past Surgical History:   Procedure Laterality Date    BLADDER REPAIR      BRONCHOSCOPY  2024    Procedure: BRONCHOSCOPY with washing and biopsies WITH ENDOBRONCHIAL ULTRASOUND with FNA;  Surgeon: Govind Mar MD;  Location: [facility] ENDOSCOPY;  Service: Robotics - Pulmonary;;  pre/post: lung mass     SECTION      INNER EAR SURGERY Bilateral     CHOLESTEOMA    VULVA BIOPSY Right 2021    Procedure: WIDE LOCAL EXCISION OF VULVA LESION;  Surgeon: Dwayne Sood MD;  [place] [facility] [place] [facility] [place]  Service: [place]necology;  Laterality: Right;        Anthropometrics        Current Height  Current Weight  BMI kg/m2 Height: 185.4 cm (72.99\")  Weight: 68 kg (149 lb 14.4 oz) (24 0614)  Body mass index is 19.78 kg/m².   Adjusted BMI (if applicable)    BMI Category Normal/Healthy (18.4 - 24.9)   Ideal Body Weight (IBW) 166#   Usual Body Weight (UBW) 195# per pt   Weight Trend Loss   Weight History Wt Readings from Last 30 Encounters:   24 0614 68 kg (149 " lb 14.4 oz)   09/23/24 0330 68.5 kg (151 lb)   09/22/24 0625 68.9 kg (151 lb 14.4 oz)   09/21/24 0632 69.6 kg (153 lb 8 oz)   09/20/24 0445 69.9 kg (154 lb 3.2 oz)   09/19/24 1944 69.9 kg (154 lb)   11/12/21 0658 91.3 kg (201 lb 4.5 oz)   11/10/21 0845 90.7 kg (200 lb)        Estimated Requirements         Weight used  69.9 kg    Calories  7987 -2147 kcal (30-35 kcal/kg)    Protein  84 - 105 g (1.2 - 1.5 gm/kg)    Fluid   (1 mL/kcal)     Labs       Pertinent Labs    Results from last 7 days   Lab Units 09/24/24  0726 09/23/24  1906 09/23/24  0448 09/22/24  0258   SODIUM mmol/L 134*  --  134* 136   POTASSIUM mmol/L 3.3* 2.9* 2.8* 3.9   CHLORIDE mmol/L 96*  --  92* 95*   CO2 mmol/L 28.0  --  31.0* 31.2*   BUN mg/dL 14  --  16 11   CREATININE mg/dL 0.43*  --  0.55* 0.55*   CALCIUM mg/dL 8.9  --  9.1 9.2   BILIRUBIN mg/dL 0.5  --  0.6 0.6   ALK PHOS U/L 109  --  129* 122*   ALT (SGPT) U/L 44*  --  41* 45*   AST (SGOT) U/L 33*  --  34* 37*   GLUCOSE mg/dL 111*  --  129* 153*     Results from last 7 days   Lab Units 09/24/24  0726 09/21/24  0351 09/20/24  1003 09/19/24 2006   MAGNESIUM mg/dL  --   --  2.0 2.0   HEMOGLOBIN g/dL 12.7   < > 12.3 14.2   HEMATOCRIT % 37.5   < > 36.8 42.0   WBC 10*3/mm3 14.06*   < > 13.70* 17.19*   ALBUMIN g/dL 3.5   < >  --  3.9    < > = values in this interval not displayed.     Results from last 7 days   Lab Units 09/24/24  0726 09/23/24  0448 09/22/24  0258 09/21/24  0351 09/20/24  1003 09/20/24  0001   INR   --   --   --   --   --  1.12*   PLATELETS 10*3/mm3 102* 103* 115* 106* 104*  --      COVID19   Date Value Ref Range Status   09/19/2024 Not Detected Not Detected - Ref. Range Final     Lab Results   Component Value Date    HGBA1C 7.5 (H) 09/19/2024          Medications           Scheduled Medications amLODIPine, 5 mg, Oral, Q24H  budesonide-formoterol, 2 puff, Inhalation, BID - RT  [Held by provider] enoxaparin, 40 mg, Subcutaneous, Q24H  hydrALAZINE, 50 mg, Oral, Q8H  insulin  glargine, 2 Units, Subcutaneous, Nightly  insulin lispro, 2-7 Units, Subcutaneous, 4x Daily AC & at Bedtime  ipratropium-albuterol, 3 mL, Nebulization, 4x Daily - RT  piperacillin-tazobactam, 3.375 g, Intravenous, Q8H  spironolactone, 100 mg, Oral, BID  tiotropium bromide monohydrate, 2 puff, Inhalation, Daily - RT       Infusions Pharmacy to Dose Zosyn,        PRN Medications   acetaminophen    senna-docusate sodium **AND** polyethylene glycol **AND** bisacodyl **AND** bisacodyl    Calcium Replacement - Follow Nurse / BPA Driven Protocol    dextrose    dextrose    glucagon (human recombinant)    ipratropium-albuterol    Magnesium Standard Dose Replacement - Follow Nurse / BPA Driven Protocol    nitroglycerin    ondansetron ODT **OR** ondansetron    Pharmacy to Dose Zosyn    Phosphorus Replacement - Follow Nurse / BPA Driven Protocol    Potassium Replacement - Follow Nurse / BPA Driven Protocol    Potassium Replacement - Follow Nurse / BPA Driven Protocol     Physical Findings          General Findings alert, oriented   Oral/Mouth Cavity tooth or teeth missing   Edema  no edema   Gastrointestinal WDL, last bowel movement: 9/23   Skin  bruising   Tubes/Drains/Lines none   NFPE See Malnutrition Severity Assessment, Date Completed: 9/20 GR     Malnutrition Severity Assessment      Patient meets criteria for : Severe Malnutrition           Current Nutrition Orders & Evaluation of Intake       Oral Nutrition     Food Allergies NKFA   Current PO Diet Diet: Cardiac, Diabetic; Healthy Heart (2-3 Na+); Consistent Carbohydrate; Fluid Consistency: Thin (IDDSI 0)   Supplement Boost Glucose Control, BID   PO Evaluation     % PO Intake 75%    Factors Affecting Intake: decreased appetite, emotional status   --  PES STATEMENT / NUTRITION DIAGNOSIS      Nutrition Dx Problem  Problem: Malnutrition (severe)  Etiology: Medical Diagnosis - possible lung malignancy    Signs/Symptoms: NFPE Results, Unintended Weight Change, and  Report/Observation     NUTRITION INTERVENTION / PLAN OF CARE      Intervention Goal(s) Establish goals of care, Reduce/improve symptoms, Meet estimated needs, Disease management/therapy, Establish PO intake, Increase intake, No significant weight loss, and PO intake goal %: >75%         RD Intervention/Action Supplement offered/declined, Encourage intake, Continue to monitor, Care plan reviewed, Recommend/order: boost GC, and Education regarding: DM2   --      Prescription/Orders:       PO Diet       Supplements Boost glucose control BID vanilla      Enteral Nutrition       Parenteral Nutrition    New Prescription Ordered? Yes   --      Monitor/Evaluation Per protocol   Discharge Plan/Needs Pending clinical course   --    RD to follow per protocol.      Electronically signed by:  Cherelle Jorgensen RD  09/24/24 17:25 EDT

## 2024-09-24 NOTE — PROGRESS NOTES
[facility] Clinical Pharmacy Services: Piperacillin-Tazobactam Consult    Pt Name: Kranthi Farley   : 1960    Indication: Pneumonia    Relevant clinical data and objective history reviewed:    Past Medical History:   Diagnosis Date    Cancer     SKIN REMOVED    Cholesteatoma     BILATERAL EARS    PONV (postoperative nausea and vomiting)      Creatinine   Date Value Ref Range Status   2024 0.43 (L) 0.57 - 1.00 mg/dL Final   2024 0.55 (L) 0.57 - 1.00 mg/dL Final   2024 0.55 (L) 0.57 - 1.00 mg/dL Final   2022 0.58 0.55 - 1.02 mg/dL Final     BUN   Date Value Ref Range Status   2024 14 8 - 23 mg/dL Final   2022 8 (L) 10 - 20 mg/dL Final     Estimated Creatinine Clearance: 141.9 mL/min (A) (by C-G formula based on SCr of 0.43 mg/dL (L)).    Lab Results   Component Value Date    WBC 14.06 (H) 2024     Temp Readings from Last 3 Encounters:   24 98.1 °F (36.7 °C) (Oral)   21 97.8 °F (36.6 °C) (Temporal)      Assessment/Plan  Estimated CrCl >20 mL/min at this time; BMI 19.78 kg/m2  Will start piperacillin-tazobactam 3.375 g IV every 8 hours     Pharmacy will continue to follow daily while on piperacillin-tazobactam and adjust as needed. Thank you for this consult.    Ewelina Piper [facility]  Clinical Pharmacist

## 2024-09-24 NOTE — PROGRESS NOTES
"   LOS: 5 days     Chief Complaint/ Reason for encounter: Hypertension, hyperaldosteronism    Subjective   09/21/24 : Patient is doing well today with no new complaints  Good appetite with no nausea or vomiting  No shortness of breath chest pain or edema  Voiding well with no dysuria    9/22: No new complaints or events, BP remains elevated but she is asymptomatic    9/23: Scheduled for bronchoscopy after potassium replacement  No new complaints today, n.p.o. for the procedure    9/24: No new complaints or events, blood pressure much better over the last 24 hours  Status post bronc with biopsy, path pending    Medical history reviewed:  History of Present Illness    Subjective    History taken from: Patient and chart    Vital Signs  Temp:  [97.9 °F (36.6 °C)-98.1 °F (36.7 °C)] 98.1 °F (36.7 °C)  Heart Rate:  [] 100  Resp:  [16-24] 20  BP: (134-220)/() 142/66       Wt Readings from Last 1 Encounters:   09/24/24 0614 68 kg (149 lb 14.4 oz)   09/23/24 0330 68.5 kg (151 lb)   09/22/24 0625 68.9 kg (151 lb 14.4 oz)   09/21/24 0632 69.6 kg (153 lb 8 oz)   09/20/24 0445 69.9 kg (154 lb 3.2 oz)   09/19/24 1944 69.9 kg (154 lb)       Objective:  Vital signs: (most recent): Blood pressure 142/66, pulse 101, temperature 98.1 °F (36.7 °C), temperature source Oral, resp. rate 18, height 185.4 cm (72.99\"), weight 68 kg (149 lb 14.4 oz), SpO2 95%.                Objective:  General Appearance:  Comfortable, well-appearing, in no acute distress and not in pain.  Awake, alert  HEENT: Mucous membranes moist, no injury, oropharynx clear  Lungs:  Normal effort and normal respiratory rate.  Breath sounds clear to auscultation.  No  respiratory distress.  No rales, decreased breath sounds or rhonchi.    Heart: Normal rate.  Regular rhythm.  S1, S2 normal.  No murmur.   Abdomen: Abdomen is soft.  Bowel sounds are normal, no abdominal tenderness.  There is no rebound or guarding  Extremities: No edema of bilateral lower " extremities  Skin:  Warm and dry with no rashes      Results Review:    Intake/Output:     Intake/Output Summary (Last 24 hours) at 9/24/2024 1035  Last data filed at 9/23/2024 2200  Gross per 24 hour   Intake 280 ml   Output --   Net 280 ml           DATA:  Radiology and Labs:  The following labs independently reviewed by me. Additional labs ordered for tomorrow a.m.  Interval notes, chart personally reviewed by me.   Old records independently reviewed showing longstanding smoking history, weight loss  Discussed with patient herself at bedside    Risk/ complexity of medical care/ medical decision making moderate complexity of the BP and electrolytes    Labs:   Recent Results (from the past 24 hour(s))   POC Glucose Once    Collection Time: 09/23/24 11:21 AM    Specimen: Blood   Result Value Ref Range    Glucose 123 70 - 130 mg/dL   Respiratory Culture - Aspirate, Bronchus    Collection Time: 09/23/24  2:23 PM    Specimen: Bronchus; Aspirate   Result Value Ref Range    Gram Stain Moderate (3+) WBCs seen     Gram Stain Few (2+) Epithelial cells seen     Gram Stain No organisms seen    ECG 12 Lead Chest Pain    Collection Time: 09/23/24  4:21 PM   Result Value Ref Range    QT Interval 345 ms    QTC Interval 452 ms   POC Glucose Once    Collection Time: 09/23/24  4:42 PM    Specimen: Blood   Result Value Ref Range    Glucose 169 (H) 70 - 130 mg/dL   Potassium    Collection Time: 09/23/24  7:06 PM    Specimen: Blood   Result Value Ref Range    Potassium 2.9 (L) 3.5 - 5.2 mmol/L   POC Glucose Once    Collection Time: 09/23/24  8:18 PM    Specimen: Blood   Result Value Ref Range    Glucose 125 70 - 130 mg/dL   POC Glucose Once    Collection Time: 09/24/24  5:53 AM    Specimen: Blood   Result Value Ref Range    Glucose 121 70 - 130 mg/dL   Comprehensive Metabolic Panel    Collection Time: 09/24/24  7:26 AM    Specimen: Blood   Result Value Ref Range    Glucose 111 (H) 65 - 99 mg/dL    BUN 14 8 - 23 mg/dL    Creatinine 0.43  (L) 0.57 - 1.00 mg/dL    Sodium 134 (L) 136 - 145 mmol/L    Potassium 3.3 (L) 3.5 - 5.2 mmol/L    Chloride 96 (L) 98 - 107 mmol/L    CO2 28.0 22.0 - 29.0 mmol/L    Calcium 8.9 8.6 - 10.5 mg/dL    Total Protein 5.9 (L) 6.0 - 8.5 g/dL    Albumin 3.5 3.5 - 5.2 g/dL    ALT (SGPT) 44 (H) 1 - 33 U/L    AST (SGOT) 33 (H) 1 - 32 U/L    Alkaline Phosphatase 109 39 - 117 U/L    Total Bilirubin 0.5 0.0 - 1.2 mg/dL    Globulin 2.4 gm/dL    A/G Ratio 1.5 g/dL    BUN/Creatinine Ratio 32.6 (H) 7.0 - 25.0    Anion Gap 10.0 5.0 - 15.0 mmol/L    eGFR 108.8 >60.0 mL/min/1.73   CBC Auto Differential    Collection Time: 09/24/24  7:26 AM    Specimen: Blood   Result Value Ref Range    WBC 14.06 (H) 3.40 - 10.80 10*3/mm3    RBC 4.47 3.77 - 5.28 10*6/mm3    Hemoglobin 12.7 12.0 - 15.9 g/dL    Hematocrit 37.5 34.0 - 46.6 %    MCV 83.9 79.0 - 97.0 fL    MCH 28.4 26.6 - 33.0 pg    MCHC 33.9 31.5 - 35.7 g/dL    RDW 14.5 12.3 - 15.4 %    RDW-SD 44.0 37.0 - 54.0 fl    MPV 8.7 6.0 - 12.0 fL    Platelets 102 (L) 140 - 450 10*3/mm3    Neutrophil % 79.2 (H) 42.7 - 76.0 %    Lymphocyte % 10.8 (L) 19.6 - 45.3 %    Monocyte % 4.7 (L) 5.0 - 12.0 %    Eosinophil % 0.1 (L) 0.3 - 6.2 %    Basophil % 0.2 0.0 - 1.5 %    Immature Grans % 5.0 (H) 0.0 - 0.5 %    Neutrophils, Absolute 11.12 (H) 1.70 - 7.00 10*3/mm3    Lymphocytes, Absolute 1.52 0.70 - 3.10 10*3/mm3    Monocytes, Absolute 0.66 0.10 - 0.90 10*3/mm3    Eosinophils, Absolute 0.02 0.00 - 0.40 10*3/mm3    Basophils, Absolute 0.03 0.00 - 0.20 10*3/mm3    Immature Grans, Absolute 0.71 (H) 0.00 - 0.05 10*3/mm3    nRBC 0.0 0.0 - 0.2 /100 WBC       Radiology:  Pertinent radiology studies were reviewed as described above      Medications have been reviewed separately in chart overview      ASSESSMENT:  Hypertension, new diagnosis, likely secondary to hyperaldosteronism    Hypokalemia    Leukocytosis    Thrombocytopenia    CAP (community acquired pneumonia)    Type 2 diabetes mellitus with hyperglycemia,  without long-term current use of insulin    Pulmonary nodules  Probable metastatic lung cancer, needs pathology confirmation    Elevated blood pressure reading without diagnosis of hypertension           DISCUSSION/PLAN:   Blood pressure much improved, hopefully the spironolactone is reaching good steady state concentration with much better BP readings over the last 24 hours  Spironolactone has a long half-life and can take 4 to 5 days for full effect, dose was just titrated on 9/21  Continue spironolactone at current dosage  Maintain hydralazine 50 mg 3 times daily  Continue potassium replacement as needed: Okay to use protocol for replacement with normal GFR    Likely metastatic lung cancer with paraneoplastic syndrome  Await renin and aldosterone levels which remain in pending status: These can sometimes take weeks to result  Bronchoscopy with biopsy 9/23, path pending.        Armand Castle MD  [facility]   Office phone number: [phone]  Answering service phone number: [phone]    09/24/24  10:35 EDT    Dictation performed using Dragon dictation software

## 2024-09-24 NOTE — PROGRESS NOTES
Name: Kranthi Farley ADMIT: 2024   : 1960  PCP: Alexandra Keller PA-C    MRN: [id number] LOS: 5 days   AGE/SEX: 64 y.o. female  ROOM: [facility]     Subjective   Subjective   Patient awake and resting in bed, doing okay today, still short of breath, had bronchoscopy on .     Objective   Objective   Vital Signs  Temp:  [97.7 °F (36.5 °C)-98.1 °F (36.7 °C)] 97.7 °F (36.5 °C)  Heart Rate:  [] 115  Resp:  [18-24] 18  BP: (134-220)/() 134/70  SpO2:  [88 %-99 %] 93 %  on  Flow (L/min):  [3-15] 3;   Device (Oxygen Therapy): room air  Body mass index is 19.78 kg/m².  Physical Exam  Vitals and nursing note reviewed.   Constitutional:       General: She is not in acute distress.  Cardiovascular:      Rate and Rhythm: Normal rate and regular rhythm.      Pulses: Normal pulses.      Heart sounds: Normal heart sounds.   Pulmonary:      Effort: Pulmonary effort is normal. No respiratory distress.      Breath sounds: No stridor. Decreased breath sounds present. No wheezing or rhonchi.   Abdominal:      General: Bowel sounds are normal. There is no distension.      Palpations: Abdomen is soft.      Tenderness: There is no abdominal tenderness.   Musculoskeletal:      Right lower leg: No edema.      Left lower leg: No edema.   Skin:     General: Skin is warm and dry.   Neurological:      Mental Status: She is alert.       Results Review     I reviewed the patient's new clinical results.  Results from last 7 days   Lab Units 24  0726 24  0448 24  0258 24  0351   WBC 10*3/mm3 14.06* 13.14* 12.52* 14.19*   HEMOGLOBIN g/dL 12.7 13.0 13.0 12.6   PLATELETS 10*3/mm3 102* 103* 115* 106*     Results from last 7 days   Lab Units 24  0726 24  1906 24  0448 24  0258 24  1705 24  0351   SODIUM mmol/L 134*  --  134* 136  --  141   POTASSIUM mmol/L 3.3* 2.9* 2.8* 3.9   < > 3.5   CHLORIDE mmol/L 96*  --  92* 95*  --  99   CO2 mmol/L 28.0  --  31.0* 31.2*  --   34.3*   BUN mg/dL 14  --  16 11  --  11   CREATININE mg/dL 0.43*  --  0.55* 0.55*  --  0.50*   GLUCOSE mg/dL 111*  --  129* 153*  --  142*   EGFR mL/min/1.73 108.8  --  102.5 102.5  --  104.9    < > = values in this interval not displayed.     Results from last 7 days   Lab Units 09/24/24 0726 09/23/24 0448 09/22/24 0258 09/21/24  0351   ALBUMIN g/dL 3.5 3.7 3.8 3.6   BILIRUBIN mg/dL 0.5 0.6 0.6 0.6   ALK PHOS U/L 109 129* 122* 121*   AST (SGOT) U/L 33* 34* 37* 32   ALT (SGPT) U/L 44* 41* 45* 44*     Results from last 7 days   Lab Units 09/24/24 0726 09/23/24 0448 09/22/24 0258 09/21/24  0351 09/20/24  1003 09/19/24 2006   CALCIUM mg/dL 8.9 9.1 9.2 8.9 9.0 9.6   ALBUMIN g/dL 3.5 3.7 3.8 3.6  --  3.9   MAGNESIUM mg/dL  --   --   --   --  2.0 2.0     Results from last 7 days   Lab Units 09/20/24  0001 09/19/24 2006   PROCALCITONIN ng/mL  --  1.75*   LACTATE mmol/L 1.2  --      Glucose   Date/Time Value Ref Range Status   09/24/2024 1122 199 (H) 70 - 130 mg/dL Final   09/24/2024 0553 121 70 - 130 mg/dL Final   09/23/2024 2018 125 70 - 130 mg/dL Final   09/23/2024 1642 169 (H) 70 - 130 mg/dL Final   09/23/2024 1121 123 70 - 130 mg/dL Final   09/23/2024 0544 141 (H) 70 - 130 mg/dL Final   09/22/2024 2132 270 (H) 70 - 130 mg/dL Final       XR Chest 1 View    Result Date: 9/23/2024  No pneumothorax.  This report was finalized on 9/23/2024 3:38 PM by Dr. Ciaran Solis M.D on Workstation: [id number]       I have personally reviewed all medications:  Scheduled Medications  amLODIPine, 5 mg, Oral, Q24H  budesonide-formoterol, 2 puff, Inhalation, BID - RT  [Held by provider] enoxaparin, 40 mg, Subcutaneous, Q24H  hydrALAZINE, 50 mg, Oral, Q8H  insulin glargine, 2 Units, Subcutaneous, Nightly  insulin lispro, 2-7 Units, Subcutaneous, 4x Daily AC & at Bedtime  ipratropium-albuterol, 3 mL, Nebulization, 4x Daily - RT  piperacillin-tazobactam, 3.375 g, Intravenous, Q8H  potassium chloride ER, 40 mEq, Oral,  Q4H  spironolactone, 100 mg, Oral, BID  tiotropium bromide monohydrate, 2 puff, Inhalation, Daily - RT    Infusions  Pharmacy to Dose Zosyn,       Diet  Diet: Cardiac, Diabetic; Healthy Heart (2-3 Na+); Consistent Carbohydrate; Fluid Consistency: Thin (IDDSI 0)    I have personally reviewed:  [x]  Laboratory   []  Microbiology   [x]  Radiology   [x]  EKG/Telemetry  []  Cardiology/Vascular   []  Pathology    []  Records       Assessment/Plan     Active Hospital Problems    Diagnosis  POA    **Hypokalemia [E87.6]  Yes    Essential hypertension [I10]  Yes    Transaminitis [R74.01]  Yes    Severe malnutrition [E43]  Yes    Pulmonary nodules [R91.8]  Yes    Abnormal CT of the chest [R93.89]  Yes    Leukocytosis [D72.829]  Yes    Thrombocytopenia [D69.6]  Yes    CAP (community acquired pneumonia) [J18.9]  Yes    Type 2 diabetes mellitus with hyperglycemia, without long-term current use of insulin [E11.65]  Yes      Resolved Hospital Problems   No resolved problems to display.       64 y.o. female admitted with Hypokalemia.    Pulmonary nodules  Perihilar mass  Lymphadenopathy  Abnormal weight loss, tobacco use  - Patient with weight loss over past several months, coupled with ongoing tobacco use. CT Chest obtained, results have returned (09/20), impression suggesting primary left upper lobe malignancy with doreen (left hilar, mediastinal and left cervical), pulmonary, hepatic and osseous metastatic disease. Also noting multiple pulmonary nodules, and as above mass/adenopathy, tissue sampling recommended. MRI brain obtained, no acute intracranial abnormalities noted.  - Oncology and Pulmonology consulted, notes reviewed. Patient s/p bronchoscopy (09/23), path pending. Oncology planning for outpatient PET/CT. Will continue to follow their plans/recommendations. Greatly appreciate their help.    Pneumonia  Leukocytosis  - Findings of pneumonia noted on imaging, coupled with leukocytosis, elevated procalcitonin. UA is also  abnormal, but patient without urinary complaints.  RVP/MRSA/strep/legionella negative, blood cultures no growth thus far. WBC remains elevated on most recent labs, slightly worse, she is also more tachycardic today.  - Change antibiotics to Zosyn. Order EKG for tachycardia.    Elevated blood pressure without prior diagnosis of hypertension, newly diagnosed hypertension  Hypokalemia  Metabolic alkalosis  - Elevated BP noted, patient not on antihypertensive medications previously, multiple elevations noted, will classify as newly diagnosed hypertension. Potassium low, metabolic alkalosis noted, concern for possible hyperaldosteronism.  - Aldosterone/renin pending. Nephrology consulted and following, Discussed with Dr. Castle, given that she has newly diagnosed hypertension, coupled with hypokalemia and metabolic alkalosis, strong suspicion for hyperaldosteronism. She has been started on Aldactone, remains on this at present, tolerating well.   - Potassium low again today, will replete per protocol. Alkalosis still noted.  - BP better controlled with additions of Hydralazine and Amlodipine. Continue these as prescribed.   - Will continue to follow Nephrology plans/recommendations. Greatly appreciate their help.    Type 2 diabetes mellitus with hyperglycemia  - Most recent A1c: 7.50%  - Current treatment regimen: Glargine 2 units nightly, SSI; Glucose mostly acceptable.  - Continue medications as prescribed for now.  - Will monitor 24 hour insulin requirements and adjust as needed to achieve target inpatient range of 140-180.  - Diabetic diet    Thrombocytopenia  - Platelets found to be low on most recent labs, values relatively stable. No indications for urgent intervention at present. Will monitor for now.  - Order repeat CBC in AM for reassessment.    COPD  - No evidence to suggest acute exacerbation.  Continue current medications as prescribed and closely monitor.  - Supplemental oxygen as needed to maintain O2 sats  88 to 92%, pulse oximetry, telemetry monitoring.    Transaminitis  - Noted on labs, values remain elevated, but flat on most recent testing, but abdominal exam benign, CT showing abnormal findings involving liver, she does not appear to have any medications that are contributing. Monitor for now with CMP. Consider further evaluation as clinically indicated.    SCDs for DVT prophylaxis.  Full code.  Discussed with patient, nursing staff, consulting provider, CCP, and care team on multidisciplinary rounds.  Anticipate discharge home when cleared by consultants.  Expected Discharge Date: 9/23/2024; Expected Discharge Time:       Royer Prajapati DO  [facility]  09/24/24

## 2024-09-24 NOTE — PROGRESS NOTES
Consult Daily Progress Note  [facility]   09/24/24      Patient Name:  Kranthi Farley  MRN:  [id number]   YOB: 1960  Age: 64 y.o.  Sex: female  LOS: 5    Reason for Consult:  Left upper lobe mass    Interval History:  No acute events overnight  Tolerated bronchoscopy well  No significant cough or hemoptysis  No chest pain or palpitations  No nausea vomiting    Physical Exam:  Vitals:    09/24/24 1455   BP:    Pulse: (!) 122   Resp: 18   Temp:    SpO2: 95%       Intake/Output    Intake/Output Summary (Last 24 hours) at 9/24/2024 1538  Last data filed at 9/23/2024 2200  Gross per 24 hour   Intake 280 ml   Output --   Net 280 ml       General: Alert, nontoxic, NAD  HEENT: NC/AT, EOMI, MMM  Neck: Supple, trachea midline  Cardiac: RRR, no murmur, gallops, rubs  Pulmonary: Diminished bilaterally  GI: Soft, non-tender, non-distended, normal bowel sounds  Extremities: Warm, well perfused, no LE edema  Skin: no visible rash  Neuro: CN II - XII grossly intact  Psychiatry: Normal mood and affect    Data Review:  Results from last 7 days   Lab Units 09/24/24  0726 09/23/24 0448 09/22/24  0258 09/21/24 0351 09/20/24  1003 09/19/24  2006 09/19/24  0925   WBC 10*3/mm3 14.06* 13.14* 12.52* 14.19* 13.70* 17.19* 19.98*   HEMOGLOBIN g/dL 12.7 13.0 13.0 12.6 12.3 14.2 14.6   PLATELETS 10*3/mm3 102* 103* 115* 106* 104* 109* 129*     Results from last 7 days   Lab Units 09/24/24  0726 09/23/24  1906 09/23/24 0448 09/22/24  0258 09/21/24  1705 09/21/24 0351 09/20/24  1003 09/19/24  2006   SODIUM mmol/L 134*  --  134* 136  --  141 142 142   POTASSIUM mmol/L 3.3* 2.9* 2.8* 3.9 3.5 3.5 3.0* 2.3*   CHLORIDE mmol/L 96*  --  92* 95*  --  99 96* 92*   CO2 mmol/L 28.0  --  31.0* 31.2*  --  34.3* 37.4* 39.1*   BUN mg/dL 14  --  16 11  --  11 18 19   CREATININE mg/dL 0.43*  --  0.55* 0.55*  --  0.50* 0.65 0.67   GLUCOSE mg/dL 111*  --  129* 153*  --  142* 166* 190*   CALCIUM mg/dL 8.9  --  9.1 9.2  --  8.9 9.0  9.6   MAGNESIUM mg/dL  --   --   --   --   --   --  2.0 2.0   Estimated Creatinine Clearance: 141.9 mL/min (A) (by C-G formula based on SCr of 0.43 mg/dL (L)).    Results from last 7 days   Lab Units 09/24/24  0726 09/23/24  0448 09/22/24  0258 09/20/24  1003 09/20/24  0001 09/19/24 2006   AST (SGOT) U/L 33* 34* 37*   < >  --  31   ALT (SGPT) U/L 44* 41* 45*   < >  --  46*   PROCALCITONIN ng/mL  --   --   --   --   --  1.75*   LACTATE mmol/L  --   --   --   --  1.2  --    PLATELETS 10*3/mm3 102* 103* 115*   < >  --  109*    < > = values in this interval not displayed.               Imaging:  Reviewed chest images personally from past 3 days    ASSESSMENT  /  PLAN:    Left perihilar mass  Mediastinal and hilar lymphadenopathy  Paraneoplastic syndrome, primary hyperaldosteronism  Postobstructive pneumonia  Electrolyte derangements (hypokalemia, hypochloremia)  Metabolic alkalosis  Primary hyperaldosteronism  Leukocytosis  Thrombocytopenia  Hypertension  Unintentional weight loss  History of tobacco abuse (greater than 40-pack-year)     -Patient presented with laboratory abnormalities concerning for paraneoplastic syndrome with CT imaging demonstrating left perihilar mass with possible superimposed obstructive pneumonia.  -High risk for malignancy especially with her significant smoking history and recent weight loss.  - Patient underwent bronchoscopy which demonstrated occlusion of the left upper lobe with endobronchial biopsies and EBUS/TBNA performed.  JAZMINE demonstrated atypical cells concerning for neuroendocrine versus lymphoma.  Final pathology pending.  -Completed course of empiric antibiotics (EOT 9/23)  -Continue bronchodilator therapy.  -MRI brain without any acute intracranial process.    Thank you for allowing us to participate in this patients care. Pulmonary will continue to follow.     Govind Mar MD  [facility]  Pulmonary and Critical Care Medicine, Interventional  Pulmonology    Parts of this note may be an electronic transcription/translation of spoken language to printed text using the Dragon dictation system.

## 2024-09-24 NOTE — PLAN OF CARE
Goal Outcome Evaluation:  Plan of Care Reviewed With: patient         Alert and Oriented, on room air except with excessive coughing, oxygen applied for patient comfort at 3L as was on dayshift. Pt educated on Lantus and states this is all new to her and she needs more education. Diabetic Educator consulted. Lungs upper lobes bilat insp. Wheezes, course in lower lobes bilat. Harsh, dry hacking cough. No complaints of pain. Potassium was 2.9 prior to Potassium infusions.

## 2024-09-25 ENCOUNTER — READMISSION MANAGEMENT (OUTPATIENT)
Dept: CALL CENTER | Facility: HOSPITAL | Age: 64
End: 2024-09-25
Payer: COMMERCIAL

## 2024-09-25 VITALS
DIASTOLIC BLOOD PRESSURE: 72 MMHG | HEIGHT: 72 IN | HEART RATE: 88 BPM | BODY MASS INDEX: 20.3 KG/M2 | WEIGHT: 149.9 LBS | RESPIRATION RATE: 18 BRPM | SYSTOLIC BLOOD PRESSURE: 150 MMHG | TEMPERATURE: 97.9 F | OXYGEN SATURATION: 94 %

## 2024-09-25 DIAGNOSIS — C34.90 SMALL CELL LUNG CANCER: Primary | ICD-10-CM

## 2024-09-25 LAB
ALBUMIN SERPL-MCNC: 3.4 G/DL (ref 3.5–5.2)
ALBUMIN/GLOB SERPL: 1.6 G/DL
ALP SERPL-CCNC: 107 U/L (ref 39–117)
ALT SERPL W P-5'-P-CCNC: 59 U/L (ref 1–33)
ANION GAP SERPL CALCULATED.3IONS-SCNC: 10 MMOL/L (ref 5–15)
ANISOCYTOSIS BLD QL: ABNORMAL
AST SERPL-CCNC: 43 U/L (ref 1–32)
BACTERIA SPEC AEROBE CULT: NORMAL
BACTERIA SPEC RESP CULT: NORMAL
BASOPHILS # BLD MANUAL: 0 10*3/MM3 (ref 0–0.2)
BASOPHILS NFR BLD MANUAL: 0 % (ref 0–1.5)
BILIRUB SERPL-MCNC: 0.4 MG/DL (ref 0–1.2)
BUN SERPL-MCNC: 14 MG/DL (ref 8–23)
BUN/CREAT SERPL: 26.4 (ref 7–25)
CALCIUM SPEC-SCNC: 9 MG/DL (ref 8.6–10.5)
CHLORIDE SERPL-SCNC: 100 MMOL/L (ref 98–107)
CO2 SERPL-SCNC: 24 MMOL/L (ref 22–29)
CREAT SERPL-MCNC: 0.53 MG/DL (ref 0.57–1)
CYTO UR: NORMAL
CYTO UR: NORMAL
DEPRECATED RDW RBC AUTO: 47.8 FL (ref 37–54)
EGFRCR SERPLBLD CKD-EPI 2021: 103.4 ML/MIN/1.73
EOSINOPHIL # BLD MANUAL: 0 10*3/MM3 (ref 0–0.4)
EOSINOPHIL NFR BLD MANUAL: 0 % (ref 0.3–6.2)
ERYTHROCYTE [DISTWIDTH] IN BLOOD BY AUTOMATED COUNT: 15.1 % (ref 12.3–15.4)
GLOBULIN UR ELPH-MCNC: 2.1 GM/DL
GLUCOSE BLDC GLUCOMTR-MCNC: 146 MG/DL (ref 70–130)
GLUCOSE SERPL-MCNC: 135 MG/DL (ref 65–99)
GRAM STN SPEC: NORMAL
HCT VFR BLD AUTO: 35.9 % (ref 34–46.6)
HGB BLD-MCNC: 11.6 G/DL (ref 12–15.9)
LAB AP CASE REPORT: NORMAL
LAB AP CASE REPORT: NORMAL
LAB AP DIAGNOSIS COMMENT: NORMAL
LAB AP INTRADEPARTMENTAL CONSULT: NORMAL
LAB AP NON-GYN SPECIMEN ADEQUACY: NORMAL
LAB AP SPECIAL STAINS: NORMAL
LYMPHOCYTES # BLD MANUAL: 0.29 10*3/MM3 (ref 0.7–3.1)
LYMPHOCYTES NFR BLD MANUAL: 6 % (ref 5–12)
MAGNESIUM SERPL-MCNC: 2.6 MG/DL (ref 1.6–2.4)
MCH RBC QN AUTO: 28.1 PG (ref 26.6–33)
MCHC RBC AUTO-ENTMCNC: 32.3 G/DL (ref 31.5–35.7)
MCV RBC AUTO: 86.9 FL (ref 79–97)
MONOCYTES # BLD: 0.88 10*3/MM3 (ref 0.1–0.9)
NEUTROPHILS # BLD AUTO: 13.43 10*3/MM3 (ref 1.7–7)
NEUTROPHILS NFR BLD MANUAL: 92 % (ref 42.7–76)
PATH REPORT.FINAL DX SPEC: NORMAL
PATH REPORT.FINAL DX SPEC: NORMAL
PATH REPORT.GROSS SPEC: NORMAL
PATH REPORT.GROSS SPEC: NORMAL
PLAT MORPH BLD: NORMAL
PLATELET # BLD AUTO: 100 10*3/MM3 (ref 140–450)
PMV BLD AUTO: 9.2 FL (ref 6–12)
POTASSIUM SERPL-SCNC: 4.8 MMOL/L (ref 3.5–5.2)
PROT SERPL-MCNC: 5.5 G/DL (ref 6–8.5)
RBC # BLD AUTO: 4.13 10*6/MM3 (ref 3.77–5.28)
SODIUM SERPL-SCNC: 134 MMOL/L (ref 136–145)
VARIANT LYMPHS NFR BLD MANUAL: 2 % (ref 19.6–45.3)
WBC MORPH BLD: NORMAL
WBC NRBC COR # BLD AUTO: 14.6 10*3/MM3 (ref 3.4–10.8)

## 2024-09-25 PROCEDURE — 85007 BL SMEAR W/DIFF WBC COUNT: CPT | Performed by: STUDENT IN AN ORGANIZED HEALTH CARE EDUCATION/TRAINING PROGRAM

## 2024-09-25 PROCEDURE — 80053 COMPREHEN METABOLIC PANEL: CPT | Performed by: STUDENT IN AN ORGANIZED HEALTH CARE EDUCATION/TRAINING PROGRAM

## 2024-09-25 PROCEDURE — 94799 UNLISTED PULMONARY SVC/PX: CPT

## 2024-09-25 PROCEDURE — 85025 COMPLETE CBC W/AUTO DIFF WBC: CPT | Performed by: STUDENT IN AN ORGANIZED HEALTH CARE EDUCATION/TRAINING PROGRAM

## 2024-09-25 PROCEDURE — 83735 ASSAY OF MAGNESIUM: CPT | Performed by: INTERNAL MEDICINE

## 2024-09-25 PROCEDURE — 82948 REAGENT STRIP/BLOOD GLUCOSE: CPT

## 2024-09-25 PROCEDURE — 25010000002 PIPERACILLIN SOD-TAZOBACTAM PER 1 G: Performed by: STUDENT IN AN ORGANIZED HEALTH CARE EDUCATION/TRAINING PROGRAM

## 2024-09-25 PROCEDURE — 99232 SBSQ HOSP IP/OBS MODERATE 35: CPT | Performed by: INTERNAL MEDICINE

## 2024-09-25 RX ORDER — HYDRALAZINE HYDROCHLORIDE 50 MG/1
50 TABLET, FILM COATED ORAL EVERY 8 HOURS SCHEDULED
Qty: 90 TABLET | Refills: 0 | Status: SHIPPED | OUTPATIENT
Start: 2024-09-25

## 2024-09-25 RX ORDER — SPIRONOLACTONE 100 MG/1
100 TABLET, FILM COATED ORAL DAILY
Qty: 30 TABLET | Refills: 0 | Status: SHIPPED | OUTPATIENT
Start: 2024-09-26

## 2024-09-25 RX ORDER — AMLODIPINE BESYLATE 5 MG/1
5 TABLET ORAL
Qty: 30 TABLET | Refills: 0 | Status: SHIPPED | OUTPATIENT
Start: 2024-09-26

## 2024-09-25 RX ORDER — ALBUTEROL SULFATE 90 UG/1
2 INHALANT RESPIRATORY (INHALATION) EVERY 4 HOURS PRN
Qty: 18 G | Refills: 0 | Status: SHIPPED | OUTPATIENT
Start: 2024-09-25

## 2024-09-25 RX ORDER — BUDESONIDE, GLYCOPYRROLATE, AND FORMOTEROL FUMARATE 160; 9; 4.8 UG/1; UG/1; UG/1
2 AEROSOL, METERED RESPIRATORY (INHALATION) 2 TIMES DAILY
Qty: 10.7 G | Refills: 0 | Status: SHIPPED | OUTPATIENT
Start: 2024-09-25

## 2024-09-25 RX ORDER — METFORMIN HCL 500 MG
500 TABLET, EXTENDED RELEASE 24 HR ORAL
Qty: 30 TABLET | Refills: 0 | Status: SHIPPED | OUTPATIENT
Start: 2024-09-25

## 2024-09-25 RX ORDER — SPIRONOLACTONE 100 MG/1
100 TABLET, FILM COATED ORAL DAILY
Status: DISCONTINUED | OUTPATIENT
Start: 2024-09-26 | End: 2024-09-25 | Stop reason: HOSPADM

## 2024-09-25 RX ORDER — BUDESONIDE AND FORMOTEROL FUMARATE DIHYDRATE 160; 4.5 UG/1; UG/1
2 AEROSOL RESPIRATORY (INHALATION) 2 TIMES DAILY
Qty: 1 EACH | Refills: 1 | Status: SHIPPED | OUTPATIENT
Start: 2024-09-25 | End: 2024-09-25

## 2024-09-25 RX ADMIN — PIPERACILLIN AND TAZOBACTAM 3.38 G: 3; .375 INJECTION, POWDER, FOR SOLUTION INTRAVENOUS at 11:33

## 2024-09-25 RX ADMIN — PIPERACILLIN AND TAZOBACTAM 3.38 G: 3; .375 INJECTION, POWDER, FOR SOLUTION INTRAVENOUS at 02:51

## 2024-09-25 RX ADMIN — SPIRONOLACTONE 100 MG: 100 TABLET ORAL at 08:51

## 2024-09-25 RX ADMIN — POTASSIUM CHLORIDE 40 MEQ: 750 TABLET, EXTENDED RELEASE ORAL at 00:25

## 2024-09-25 RX ADMIN — HYDRALAZINE HYDROCHLORIDE 50 MG: 25 TABLET ORAL at 05:07

## 2024-09-25 RX ADMIN — AMLODIPINE BESYLATE 5 MG: 5 TABLET ORAL at 08:51

## 2024-09-25 RX ADMIN — HYDRALAZINE HYDROCHLORIDE 50 MG: 25 TABLET ORAL at 13:47

## 2024-09-25 RX ADMIN — IPRATROPIUM BROMIDE AND ALBUTEROL SULFATE 3 ML: .5; 3 SOLUTION RESPIRATORY (INHALATION) at 15:53

## 2024-09-25 NOTE — PROGRESS NOTES
"   LOS: 6 days     Chief Complaint/ Reason for encounter: Hypertension, hyperaldosteronism    Subjective   09/21/24 : Patient is doing well today with no new complaints  Good appetite with no nausea or vomiting  No shortness of breath chest pain or edema  Voiding well with no dysuria    9/22: No new complaints or events, BP remains elevated but she is asymptomatic    9/23: Scheduled for bronchoscopy after potassium replacement  No new complaints today, n.p.o. for the procedure    9/24: No new complaints or events, blood pressure much better over the last 24 hours  Status post bronc with biopsy, path pending    9/25: No new complaints or events, pathology came back as small cell carcinoma.  Blood pressure better controlled  Good appetite no nausea or vomiting no shortness of breath chest pain or edema      Medical history reviewed:  History of Present Illness    Subjective    History taken from: Patient and chart    Vital Signs  Temp:  [97.7 °F (36.5 °C)-98.1 °F (36.7 °C)] 98.1 °F (36.7 °C)  Heart Rate:  [] 78  Resp:  [18-22] 20  BP: (134-161)/(67-76) 148/76       Wt Readings from Last 1 Encounters:   09/24/24 0614 68 kg (149 lb 14.4 oz)   09/23/24 0330 68.5 kg (151 lb)   09/22/24 0625 68.9 kg (151 lb 14.4 oz)   09/21/24 0632 69.6 kg (153 lb 8 oz)   09/20/24 0445 69.9 kg (154 lb 3.2 oz)   09/19/24 1944 69.9 kg (154 lb)       Objective:  Vital signs: (most recent): Blood pressure 148/76, pulse 78, temperature 98.1 °F (36.7 °C), temperature source Oral, resp. rate 20, height 185.4 cm (72.99\"), weight 68 kg (149 lb 14.4 oz), SpO2 94%.                Objective:  General Appearance:  Comfortable, well-appearing, in no acute distress and not in pain.  Awake, alert  HEENT: Mucous membranes moist, no injury, oropharynx clear  Lungs:  Normal effort and normal respiratory rate.  Breath sounds clear to auscultation.  No  respiratory distress.  No rales, decreased breath sounds or rhonchi.    Heart: Normal rate.  Regular " rhythm.  S1, S2 normal.  No murmur.   Abdomen: Abdomen is soft.  Bowel sounds are normal, no abdominal tenderness.  There is no rebound or guarding  Extremities: No edema of bilateral lower extremities  Skin:  Warm and dry with no rashes      Results Review:    Intake/Output:     Intake/Output Summary (Last 24 hours) at 9/25/2024 1242  Last data filed at 9/24/2024 1700  Gross per 24 hour   Intake 240 ml   Output --   Net 240 ml           DATA:  Radiology and Labs:  The following labs independently reviewed by me. Additional labs ordered for tomorrow a.m.  Interval notes, chart personally reviewed by me.   Old records independently reviewed showing longstanding smoking history, weight loss  Discussed with patient herself at bedside    Risk/ complexity of medical care/ medical decision making moderate complexity of the BP and electrolytes    Labs:   Recent Results (from the past 24 hour(s))   POC Glucose Once    Collection Time: 09/24/24  3:48 PM    Specimen: Blood   Result Value Ref Range    Glucose 205 (H) 70 - 130 mg/dL   ECG 12 Lead Tachycardia    Collection Time: 09/24/24  4:24 PM   Result Value Ref Range    QT Interval 346 ms    QTC Interval 451 ms   Potassium    Collection Time: 09/24/24  6:19 PM    Specimen: Blood   Result Value Ref Range    Potassium 3.5 3.5 - 5.2 mmol/L   POC Glucose Once    Collection Time: 09/24/24  8:43 PM    Specimen: Blood   Result Value Ref Range    Glucose 208 (H) 70 - 130 mg/dL   Comprehensive Metabolic Panel    Collection Time: 09/25/24  4:29 AM    Specimen: Blood   Result Value Ref Range    Glucose 135 (H) 65 - 99 mg/dL    BUN 14 8 - 23 mg/dL    Creatinine 0.53 (L) 0.57 - 1.00 mg/dL    Sodium 134 (L) 136 - 145 mmol/L    Potassium 4.8 3.5 - 5.2 mmol/L    Chloride 100 98 - 107 mmol/L    CO2 24.0 22.0 - 29.0 mmol/L    Calcium 9.0 8.6 - 10.5 mg/dL    Total Protein 5.5 (L) 6.0 - 8.5 g/dL    Albumin 3.4 (L) 3.5 - 5.2 g/dL    ALT (SGPT) 59 (H) 1 - 33 U/L    AST (SGOT) 43 (H) 1 - 32 U/L     Alkaline Phosphatase 107 39 - 117 U/L    Total Bilirubin 0.4 0.0 - 1.2 mg/dL    Globulin 2.1 gm/dL    A/G Ratio 1.6 g/dL    BUN/Creatinine Ratio 26.4 (H) 7.0 - 25.0    Anion Gap 10.0 5.0 - 15.0 mmol/L    eGFR 103.4 >60.0 mL/min/1.73   Magnesium    Collection Time: 09/25/24  4:29 AM    Specimen: Blood   Result Value Ref Range    Magnesium 2.6 (H) 1.6 - 2.4 mg/dL   CBC Auto Differential    Collection Time: 09/25/24  4:29 AM    Specimen: Blood   Result Value Ref Range    WBC 14.60 (H) 3.40 - 10.80 10*3/mm3    RBC 4.13 3.77 - 5.28 10*6/mm3    Hemoglobin 11.6 (L) 12.0 - 15.9 g/dL    Hematocrit 35.9 34.0 - 46.6 %    MCV 86.9 79.0 - 97.0 fL    MCH 28.1 26.6 - 33.0 pg    MCHC 32.3 31.5 - 35.7 g/dL    RDW 15.1 12.3 - 15.4 %    RDW-SD 47.8 37.0 - 54.0 fl    MPV 9.2 6.0 - 12.0 fL    Platelets 100 (L) 140 - 450 10*3/mm3   Manual Differential    Collection Time: 09/25/24  4:29 AM    Specimen: Blood   Result Value Ref Range    Neutrophil % 92.0 (H) 42.7 - 76.0 %    Lymphocyte % 2.0 (L) 19.6 - 45.3 %    Monocyte % 6.0 5.0 - 12.0 %    Eosinophil % 0.0 (L) 0.3 - 6.2 %    Basophil % 0.0 0.0 - 1.5 %    Neutrophils Absolute 13.43 (H) 1.70 - 7.00 10*3/mm3    Lymphocytes Absolute 0.29 (L) 0.70 - 3.10 10*3/mm3    Monocytes Absolute 0.88 0.10 - 0.90 10*3/mm3    Eosinophils Absolute 0.00 0.00 - 0.40 10*3/mm3    Basophils Absolute 0.00 0.00 - 0.20 10*3/mm3    Anisocytosis Mod/2+ None Seen    WBC Morphology Normal Normal    Platelet Morphology Normal Normal   POC Glucose Once    Collection Time: 09/25/24 11:06 AM    Specimen: Blood   Result Value Ref Range    Glucose 146 (H) 70 - 130 mg/dL       Radiology:  Pertinent radiology studies were reviewed as described above      Medications have been reviewed separately in chart overview      ASSESSMENT:  Hypertension, new diagnosis, likely secondary to hyperaldosteronism, much improved    Hypokalemia, improved after replacement and with spironolactone    Leukocytosis    Thrombocytopenia    CAP  (community acquired pneumonia)    Type 2 diabetes mellitus with hyperglycemia, without long-term current use of insulin    Pulmonary nodules  Metastatic small cell lung cancer    Elevated blood pressure reading without diagnosis of hypertension           DISCUSSION/PLAN:   Blood pressure control continues to improve, BP near goal  Continue spironolactone but surprisingly her aldosterone level came back normal.  Based on these results will decrease Aldactone down to once daily dosing  No objections to discharge from renal standpoint  She will need a BMP in 1 week to monitor potassium levels  As long as her potassium is within normal limits I would continue her current spironolactone dosing  No need for further potassium replacement  Continue hydralazine and amlodipine for additional BP control      Await renin and aldosterone levels which remain in pending status: These can sometimes take weeks to result  Bronchoscopy with biopsy 9/23, path pending.        Armand Castle MD  [facility]   Office phone number: [phone]  Answering service phone number: [phone]    09/25/24  12:42 EDT    Dictation performed using Dragon dictation software

## 2024-09-25 NOTE — PLAN OF CARE
Goal Outcome Evaluation:  Plan of Care Reviewed With: patient         Pt. Received 2 doses of Potassium throughout night for potassium level of 3.5. Patient expresses frustration that potassium levels continue to be low and is concerned for when she is discharged. Awaiting 0430 draw of Potassium level. Glucose level in evening did require insulin coverage. Able to remain on room air without supplemental oxygen. Patient hoping to be discharged soon.

## 2024-09-25 NOTE — PROGRESS NOTES
Consult Daily Progress Note  [facility]   09/25/24      Patient Name:  Kranthi Farley  MRN:  [id number]   YOB: 1960  Age: 64 y.o.  Sex: female  LOS: 6    Reason for Consult:  Left upper lobe mass    Interval History:  No acute events overnight  Doing well post bronchoscopy  Breathing is stable  No chest pain or palpitations  No nausea or vomiting  Wants to go home today    Physical Exam:  Vitals:    09/25/24 1335   BP: 150/72   Pulse: 91   Resp: 20   Temp: 97.9 °F (36.6 °C)   SpO2: 94%       Intake/Output    Intake/Output Summary (Last 24 hours) at 9/25/2024 1448  Last data filed at 9/25/2024 1335  Gross per 24 hour   Intake 480 ml   Output --   Net 480 ml       General: Alert, nontoxic, NAD  HEENT: NC/AT, EOMI, MMM  Neck: Supple, trachea midline  Cardiac: RRR, no murmur, gallops, rubs  Pulmonary: Diminished bilaterally  GI: Soft, non-tender, non-distended, normal bowel sounds  Extremities: Warm, well perfused, no LE edema  Skin: no visible rash  Neuro: CN II - XII grossly intact  Psychiatry: Normal mood and affect    Data Review:  Results from last 7 days   Lab Units 09/25/24  0429 09/24/24  0726 09/23/24  0448 09/22/24  0258 09/21/24  0351 09/20/24  1003 09/19/24 2006   WBC 10*3/mm3 14.60* 14.06* 13.14* 12.52* 14.19* 13.70* 17.19*   HEMOGLOBIN g/dL 11.6* 12.7 13.0 13.0 12.6 12.3 14.2   PLATELETS 10*3/mm3 100* 102* 103* 115* 106* 104* 109*     Results from last 7 days   Lab Units 09/25/24  0429 09/24/24  1819 09/24/24  0726 09/23/24  1906 09/23/24  0448 09/22/24  0258 09/21/24  1705 09/21/24  0351 09/20/24  1003 09/19/24 2006   SODIUM mmol/L 134*  --  134*  --  134* 136  --  141 142 142   POTASSIUM mmol/L 4.8 3.5 3.3* 2.9* 2.8* 3.9 3.5 3.5 3.0* 2.3*   CHLORIDE mmol/L 100  --  96*  --  92* 95*  --  99 96* 92*   CO2 mmol/L 24.0  --  28.0  --  31.0* 31.2*  --  34.3* 37.4* 39.1*   BUN mg/dL 14  --  14  --  16 11  --  11 18 19   CREATININE mg/dL 0.53*  --  0.43*  --  0.55* 0.55*  --   0.50* 0.65 0.67   GLUCOSE mg/dL 135*  --  111*  --  129* 153*  --  142* 166* 190*   CALCIUM mg/dL 9.0  --  8.9  --  9.1 9.2  --  8.9 9.0 9.6   MAGNESIUM mg/dL 2.6*  --   --   --   --   --   --   --  2.0 2.0   Estimated Creatinine Clearance: 115.1 mL/min (A) (by C-G formula based on SCr of 0.53 mg/dL (L)).    Results from last 7 days   Lab Units 09/25/24  0429 09/24/24  0726 09/23/24  0448 09/20/24  1003 09/20/24  0001 09/19/24 2006   AST (SGOT) U/L 43* 33* 34*   < >  --  31   ALT (SGPT) U/L 59* 44* 41*   < >  --  46*   PROCALCITONIN ng/mL  --   --   --   --   --  1.75*   LACTATE mmol/L  --   --   --   --  1.2  --    PLATELETS 10*3/mm3 100* 102* 103*   < >  --  109*    < > = values in this interval not displayed.               Imaging:  Reviewed chest images personally from past 3 days    ASSESSMENT  /  PLAN:    Small cell lung cancer  Paraneoplastic syndrome, primary hyperaldosteronism  Postobstructive pneumonia  Electrolyte derangements (hypokalemia, hypochloremia)  Metabolic alkalosis  Primary hyperaldosteronism  Leukocytosis  Thrombocytopenia  Hypertension  Unintentional weight loss  History of tobacco abuse (greater than 40-pack-year)     -Patient presented with laboratory abnormalities concerning for paraneoplastic syndrome with CT imaging demonstrating left perihilar mass with possible superimposed obstructive pneumonia.  - Patient underwent bronchoscopy which demonstrated occlusion of the left upper lobe with endobronchial biopsies and EBUS/TBNA performed.  Biopsy results demonstrated small cell lung cancer with station 4L also positive.  -Completed course of empiric antibiotics (EOT 9/23)  -Continue bronchodilator therapy.  -MRI brain without any acute intracranial process.  -Patient seen by oncology who have referred her to [facility] [address] in [facility] which is closer to her residence.  She will follow-up with oncology there and be initiated on therapy.  I will set her up for follow-up in  pulmonary clinic posthospitalization.    Thank you for allowing us to participate in this patients care.  Pulmonary will sign off at this time.  Please contact us if further questions or concerns arise.    Govind aMr MD  [facility]  Pulmonary and Critical Care Medicine, Interventional Pulmonology    Parts of this note may be an electronic transcription/translation of spoken language to printed text using the Dragon dictation system.

## 2024-09-25 NOTE — PAYOR COMM NOTE
"Janet Yo (64 y.o. Female)        PLEASE SEE ATTACHED FOR CONTINUED STAY REVIEW.     REF#[id number]     PLEASE CALL  OR  [phone]    THANK YOU    BRITTANY ROSSI LPN CCP   Date of Birth   1960    Social Security Number       Address   [address] [place] [address]    Home Phone   [phone]    MRN   [id number]       Yarsanism   None    Marital Status                               Admission Date   9/19/24    Admission Type   Emergency    Admitting Provider   Kelly Reardon MD    Attending Provider   Royer Prajapati DO    Department, Room/Bed   [address], [address]/1       Discharge Date       Discharge Disposition   Home or Self Care    Discharge Destination                                 Attending Provider: Royer Prajapati DO    Allergies: No Known Allergies    Isolation: None   Infection: None   Code Status: CPR    Ht: 185.4 cm (72.99\")   Wt: 68 kg (149 lb 14.4 oz)    Admission Cmt: None   Principal Problem: Hypokalemia [E87.6]                   Active Insurance as of 9/19/2024       Primary Coverage       Payor Plan Insurance Group Employer/Plan Group    [employer]HEM BLUE CROSS ANTHEM BLUE CROSS BLUE SHIELD PPO [id number]       Payor Plan Address Payor Plan Phone Number Payor Plan Fax Number Effective Dates    [address] [phone]  1/1/2018 - None Entered    [facility] [id number]         Subscriber Name Subscriber Birth Date Member ID       JANET YO 1960 [id number]                     Emergency Contacts        (Rel.) Home Phone Work Phone Mobile Phone    Coreen Isaac (Daughter) -- -- [phone]    Bobbi Isaac (Daughter) -- -- [phone]    LUCIANO YO (Spouse) -- -- [phone]              Oxygen Therapy (last 2 days)       Date/Time SpO2 Device (Oxygen Therapy) Flow (L/min) Oxygen Concentration (%) ETCO2 (mmHg)    09/25/24 1553 94 room air -- -- --    09/25/24 1430 -- partial rebreather mask " -- -- --    09/25/24 1335 94 room air -- -- --    09/25/24 0850 -- room air -- -- --    09/25/24 0740 94 room air -- -- --    09/24/24 1944 94 room air -- -- --    09/24/24 1455 95 room air -- -- --    09/24/24 1400 -- room air -- -- --    09/24/24 1325 93 room air -- -- --    09/24/24 1040 95 room air -- -- --    09/24/24 0917 -- room air -- -- --    09/24/24 0725 94 room air -- -- --    09/24/24 0703 93 room air -- -- --    09/23/24 2330 99 nasal cannula -- -- --    09/23/24 2200 -- nasal cannula 3 -- --    09/23/24 1933 98 nasal cannula -- -- --    09/23/24 1821 93 -- -- -- --    09/23/24 1655 96 nasal cannula 6 -- --    09/23/24 1615 93 nasal cannula 3 -- --    09/23/24 1607 94 nasal cannula 6 -- --    09/23/24 1604 95 nasal cannula 6 -- --    09/23/24 1554 98 nonrebreather mask 15 -- --    09/23/24 1544 90 nasal cannula 6 -- --    09/23/24 1534 88 nasal cannula 6 -- --    09/23/24 1511 90 nasal cannula 5 -- --    09/23/24 1256 94 room air -- -- --    09/23/24 1252 93 room air -- -- --    09/23/24 1200 -- room air -- -- --    09/23/24 0800 -- room air -- -- --    09/23/24 0700 95 -- -- -- --    09/23/24 0657 96 room air -- -- --    09/23/24 0601 94 room air -- -- --    09/23/24 0427 91 -- -- -- --    09/23/24 0330 94 room air -- -- --    09/23/24 0247 94 -- -- -- --    09/23/24 0210 95 -- -- -- --          Intake & Output (last 2 days)         09/23 0701 09/24 0700 09/24 0701 09/25 0700 09/25 0701 09/26 0700    P.O. 280 240 240    Total Intake(mL/kg) 280 (4.1) 240 (3.5) 240 (3.5)    Net +280 +240 +240           Urine Unmeasured Occurrence 2 x            Lines, Drains & Airways       Active LDAs       Name Placement date Placement time Site Days    Peripheral IV 09/23/24 0632 Posterior;Right Hand 09/23/24  0632  Hand  2    Peripheral IV 09/23/24 1320 Posterior;Proximal;Right Forearm 09/23/24  1320  Forearm  2                  Operative/Procedure Notes (last 48 hours)  Notes from 09/23/24 1556 through 09/25/24  1556   No notes of this type exist for this encounter.          Physician Progress Notes (last 48 hours)        Govind Mar MD at 09/25/24 1448            Consult Daily Progress Note  [facility]   09/25/24      Patient Name:  Kranthi Farley  MRN:  [id number]   YOB: 1960  Age: 64 y.o.  Sex: female  LOS: 6    Reason for Consult:  Left upper lobe mass    Interval History:  No acute events overnight  Doing well post bronchoscopy  Breathing is stable  No chest pain or palpitations  No nausea or vomiting  Wants to go home today    Physical Exam:  Vitals:    09/25/24 1335   BP: 150/72   Pulse: 91   Resp: 20   Temp: 97.9 °F (36.6 °C)   SpO2: 94%       Intake/Output    Intake/Output Summary (Last 24 hours) at 9/25/2024 1448  Last data filed at 9/25/2024 1335  Gross per 24 hour   Intake 480 ml   Output --   Net 480 ml       General: Alert, nontoxic, NAD  HEENT: NC/AT, EOMI, MMM  Neck: Supple, trachea midline  Cardiac: RRR, no murmur, gallops, rubs  Pulmonary: Diminished bilaterally  GI: Soft, non-tender, non-distended, normal bowel sounds  Extremities: Warm, well perfused, no LE edema  Skin: no visible rash  Neuro: CN II - XII grossly intact  Psychiatry: Normal mood and affect    Data Review:  Results from last 7 days   Lab Units 09/25/24  0429 09/24/24  0726 09/23/24  0448 09/22/24  0258 09/21/24  0351 09/20/24  1003 09/19/24 2006   WBC 10*3/mm3 14.60* 14.06* 13.14* 12.52* 14.19* 13.70* 17.19*   HEMOGLOBIN g/dL 11.6* 12.7 13.0 13.0 12.6 12.3 14.2   PLATELETS 10*3/mm3 100* 102* 103* 115* 106* 104* 109*     Results from last 7 days   Lab Units 09/25/24  0429 09/24/24  1819 09/24/24  0726 09/23/24  1906 09/23/24  0448 09/22/24  0258 09/21/24  1705 09/21/24  0351 09/20/24  1003 09/19/24 2006   SODIUM mmol/L 134*  --  134*  --  134* 136  --  141 142 142   POTASSIUM mmol/L 4.8 3.5 3.3* 2.9* 2.8* 3.9 3.5 3.5 3.0* 2.3*   CHLORIDE mmol/L 100  --  96*  --  92* 95*  --  99 96* 92*   CO2 mmol/L  24.0  --  28.0  --  31.0* 31.2*  --  34.3* 37.4* 39.1*   BUN mg/dL 14  --  14  --  16 11  --  11 18 19   CREATININE mg/dL 0.53*  --  0.43*  --  0.55* 0.55*  --  0.50* 0.65 0.67   GLUCOSE mg/dL 135*  --  111*  --  129* 153*  --  142* 166* 190*   CALCIUM mg/dL 9.0  --  8.9  --  9.1 9.2  --  8.9 9.0 9.6   MAGNESIUM mg/dL 2.6*  --   --   --   --   --   --   --  2.0 2.0   Estimated Creatinine Clearance: 115.1 mL/min (A) (by C-G formula based on SCr of 0.53 mg/dL (L)).    Results from last 7 days   Lab Units 09/25/24  0429 09/24/24  0726 09/23/24  0448 09/20/24  1003 09/20/24  0001 09/19/24 2006   AST (SGOT) U/L 43* 33* 34*   < >  --  31   ALT (SGPT) U/L 59* 44* 41*   < >  --  46*   PROCALCITONIN ng/mL  --   --   --   --   --  1.75*   LACTATE mmol/L  --   --   --   --  1.2  --    PLATELETS 10*3/mm3 100* 102* 103*   < >  --  109*    < > = values in this interval not displayed.               Imaging:  Reviewed chest images personally from past 3 days    ASSESSMENT  /  PLAN:    Small cell lung cancer  Paraneoplastic syndrome, primary hyperaldosteronism  Postobstructive pneumonia  Electrolyte derangements (hypokalemia, hypochloremia)  Metabolic alkalosis  Primary hyperaldosteronism  Leukocytosis  Thrombocytopenia  Hypertension  Unintentional weight loss  History of tobacco abuse (greater than 40-pack-year)     -Patient presented with laboratory abnormalities concerning for paraneoplastic syndrome with CT imaging demonstrating left perihilar mass with possible superimposed obstructive pneumonia.  - Patient underwent bronchoscopy which demonstrated occlusion of the left upper lobe with endobronchial biopsies and EBUS/TBNA performed.  Biopsy results demonstrated small cell lung cancer with station 4L also positive.  -Completed course of empiric antibiotics (EOT 9/23)  -Continue bronchodilator therapy.  -MRI brain without any acute intracranial process.  -Patient seen by oncology who have referred her to [facility] in  [facility] which is closer to her residence.  She will follow-up with oncology there and be initiated on therapy.  I will set her up for follow-up in pulmonary clinic posthospitalization.    Thank you for allowing us to participate in this patients care.  Pulmonary will sign off at this time.  Please contact us if further questions or concerns arise.    Govind Mar MD  [facility]  Pulmonary and Critical Care Medicine, Interventional Pulmonology    Parts of this note may be an electronic transcription/translation of spoken language to printed text using the Dragon dictation system.         Electronically signed by Govind Mar MD at 09/25/24 1450       Armand Castle MD at 09/25/24 1242             LOS: 6 days     Chief Complaint/ Reason for encounter: Hypertension, hyperaldosteronism    Subjective   09/21/24 : Patient is doing well today with no new complaints  Good appetite with no nausea or vomiting  No shortness of breath chest pain or edema  Voiding well with no dysuria    9/22: No new complaints or events, BP remains elevated but she is asymptomatic    9/23: Scheduled for bronchoscopy after potassium replacement  No new complaints today, n.p.o. for the procedure    9/24: No new complaints or events, blood pressure much better over the last 24 hours  Status post bronc with biopsy, path pending    9/25: No new complaints or events, pathology came back as small cell carcinoma.  Blood pressure better controlled  Good appetite no nausea or vomiting no shortness of breath chest pain or edema      Medical history reviewed:  History of Present Illness    Subjective    History taken from: Patient and chart    Vital Signs  Temp:  [97.7 °F (36.5 °C)-98.1 °F (36.7 °C)] 98.1 °F (36.7 °C)  Heart Rate:  [] 78  Resp:  [18-22] 20  BP: (134-161)/(67-76) 148/76       Wt Readings from Last 1 Encounters:   09/24/24 0614 68 kg (149 lb 14.4 oz)   09/23/24 0330 68.5 kg (151 lb)   09/22/24  "0625 68.9 kg (151 lb 14.4 oz)   09/21/24 0632 69.6 kg (153 lb 8 oz)   09/20/24 0445 69.9 kg (154 lb 3.2 oz)   09/19/24 1944 69.9 kg (154 lb)       Objective:  Vital signs: (most recent): Blood pressure 148/76, pulse 78, temperature 98.1 °F (36.7 °C), temperature source Oral, resp. rate 20, height 185.4 cm (72.99\"), weight 68 kg (149 lb 14.4 oz), SpO2 94%.                Objective:  General Appearance:  Comfortable, well-appearing, in no acute distress and not in pain.  Awake, alert  HEENT: Mucous membranes moist, no injury, oropharynx clear  Lungs:  Normal effort and normal respiratory rate.  Breath sounds clear to auscultation.  No  respiratory distress.  No rales, decreased breath sounds or rhonchi.    Heart: Normal rate.  Regular rhythm.  S1, S2 normal.  No murmur.   Abdomen: Abdomen is soft.  Bowel sounds are normal, no abdominal tenderness.  There is no rebound or guarding  Extremities: No edema of bilateral lower extremities  Skin:  Warm and dry with no rashes      Results Review:    Intake/Output:     Intake/Output Summary (Last 24 hours) at 9/25/2024 1242  Last data filed at 9/24/2024 1700  Gross per 24 hour   Intake 240 ml   Output --   Net 240 ml           DATA:  Radiology and Labs:  The following labs independently reviewed by me. Additional labs ordered for tomorrow a.m.  Interval notes, chart personally reviewed by me.   Old records independently reviewed showing longstanding smoking history, weight loss  Discussed with patient herself at bedside    Risk/ complexity of medical care/ medical decision making moderate complexity of the BP and electrolytes    Labs:   Recent Results (from the past 24 hour(s))   POC Glucose Once    Collection Time: 09/24/24  3:48 PM    Specimen: Blood   Result Value Ref Range    Glucose 205 (H) 70 - 130 mg/dL   ECG 12 Lead Tachycardia    Collection Time: 09/24/24  4:24 PM   Result Value Ref Range    QT Interval 346 ms    QTC Interval 451 ms   Potassium    Collection Time: " 09/24/24  6:19 PM    Specimen: Blood   Result Value Ref Range    Potassium 3.5 3.5 - 5.2 mmol/L   POC Glucose Once    Collection Time: 09/24/24  8:43 PM    Specimen: Blood   Result Value Ref Range    Glucose 208 (H) 70 - 130 mg/dL   Comprehensive Metabolic Panel    Collection Time: 09/25/24  4:29 AM    Specimen: Blood   Result Value Ref Range    Glucose 135 (H) 65 - 99 mg/dL    BUN 14 8 - 23 mg/dL    Creatinine 0.53 (L) 0.57 - 1.00 mg/dL    Sodium 134 (L) 136 - 145 mmol/L    Potassium 4.8 3.5 - 5.2 mmol/L    Chloride 100 98 - 107 mmol/L    CO2 24.0 22.0 - 29.0 mmol/L    Calcium 9.0 8.6 - 10.5 mg/dL    Total Protein 5.5 (L) 6.0 - 8.5 g/dL    Albumin 3.4 (L) 3.5 - 5.2 g/dL    ALT (SGPT) 59 (H) 1 - 33 U/L    AST (SGOT) 43 (H) 1 - 32 U/L    Alkaline Phosphatase 107 39 - 117 U/L    Total Bilirubin 0.4 0.0 - 1.2 mg/dL    Globulin 2.1 gm/dL    A/G Ratio 1.6 g/dL    BUN/Creatinine Ratio 26.4 (H) 7.0 - 25.0    Anion Gap 10.0 5.0 - 15.0 mmol/L    eGFR 103.4 >60.0 mL/min/1.73   Magnesium    Collection Time: 09/25/24  4:29 AM    Specimen: Blood   Result Value Ref Range    Magnesium 2.6 (H) 1.6 - 2.4 mg/dL   CBC Auto Differential    Collection Time: 09/25/24  4:29 AM    Specimen: Blood   Result Value Ref Range    WBC 14.60 (H) 3.40 - 10.80 10*3/mm3    RBC 4.13 3.77 - 5.28 10*6/mm3    Hemoglobin 11.6 (L) 12.0 - 15.9 g/dL    Hematocrit 35.9 34.0 - 46.6 %    MCV 86.9 79.0 - 97.0 fL    MCH 28.1 26.6 - 33.0 pg    MCHC 32.3 31.5 - 35.7 g/dL    RDW 15.1 12.3 - 15.4 %    RDW-SD 47.8 37.0 - 54.0 fl    MPV 9.2 6.0 - 12.0 fL    Platelets 100 (L) 140 - 450 10*3/mm3   Manual Differential    Collection Time: 09/25/24  4:29 AM    Specimen: Blood   Result Value Ref Range    Neutrophil % 92.0 (H) 42.7 - 76.0 %    Lymphocyte % 2.0 (L) 19.6 - 45.3 %    Monocyte % 6.0 5.0 - 12.0 %    Eosinophil % 0.0 (L) 0.3 - 6.2 %    Basophil % 0.0 0.0 - 1.5 %    Neutrophils Absolute 13.43 (H) 1.70 - 7.00 10*3/mm3    Lymphocytes Absolute 0.29 (L) 0.70 - 3.10  10*3/mm3    Monocytes Absolute 0.88 0.10 - 0.90 10*3/mm3    Eosinophils Absolute 0.00 0.00 - 0.40 10*3/mm3    Basophils Absolute 0.00 0.00 - 0.20 10*3/mm3    Anisocytosis Mod/2+ None Seen    WBC Morphology Normal Normal    Platelet Morphology Normal Normal   POC Glucose Once    Collection Time: 09/25/24 11:06 AM    Specimen: Blood   Result Value Ref Range    Glucose 146 (H) 70 - 130 mg/dL       Radiology:  Pertinent radiology studies were reviewed as described above      Medications have been reviewed separately in chart overview      ASSESSMENT:  Hypertension, new diagnosis, likely secondary to hyperaldosteronism, much improved    Hypokalemia, improved after replacement and with spironolactone    Leukocytosis    Thrombocytopenia    CAP (community acquired pneumonia)    Type 2 diabetes mellitus with hyperglycemia, without long-term current use of insulin    Pulmonary nodules  Metastatic small cell lung cancer    Elevated blood pressure reading without diagnosis of hypertension           DISCUSSION/PLAN:   Blood pressure control continues to improve, BP near goal  Continue spironolactone but surprisingly her aldosterone level came back normal.  Based on these results will decrease Aldactone down to once daily dosing  No objections to discharge from renal standpoint  She will need a BMP in 1 week to monitor potassium levels  As long as her potassium is within normal limits I would continue her current spironolactone dosing  No need for further potassium replacement  Continue hydralazine and amlodipine for additional BP control      Await renin and aldosterone levels which remain in pending status: These can sometimes take weeks to result  Bronchoscopy with biopsy 9/23, path pending.        Armand Castle MD  [facility]   Office phone number: [phone]  Answering service phone number: [phone]    09/25/24  12:42 EDT    Dictation performed using Dragon dictation software      Electronically  signed by Armand Castle MD at 09/25/24 1244       Preston Nevarez MD at 09/25/24 1213                HISTORY OF PRESENT ILLNESS:   The patient is a 64 y.o. year old female with medical history significant for extensive tobacco abuse (> 40 pack years), COPD and history of SIADH/hyponatremia had presented to [facility] 9/19/2024 with abnormal labs and weight loss of 40 pounds.  Patient had routine lab work performed through her PCP and directed to come to the ER.    Lab work in the ER noted severe hypokalemia (2.3), metabolic alkalosis (CO2 39.1, chloride 92), normal LFTs (total bilirubin 1.3, ALT 46, ), leukocytosis of 17.19 and thrombocytopenia of 129,000.  Normal sodium (142) and creatinine.  Patient was hypertensive with BP of 197/104.  Otherwise normal vitals.     A CT C/A/P demonstrated left perihilar mass, 3.6 x 2.1 cm with significant left hilar and mediastinal lymphadenopathy, prevascular doreen conglomerate measuring 4.3 x 2.2 cm.  Left hilar lymphadenopathy causes severe narrowing of the IRIS proximal segmental bronchi.  Numerous (greater than 20) hypoenhancing bilobar hepatic lesions, largest 2.4 cm in hepatic segment 4A.  Normal bilateral adrenal glands.  No abdominal or pelvic lymphadenopathy.     Hematology/oncology has been consulted for further evaluation and management seen 9/20/2024    Interval history:  9/22/2024  No acute issues overnight.  Patient sitting comfortably in bed.  Blood pressure remains elevated.  Patient denies any headaches, chest pain or lightheadedness.      9/23/2024  T97.3, pulse 90, respirations 16, /99  Bronchoscopy planned 9/23/2024-patient now being transported  H&H 13.0 and 30.1, white count of 13,140, platelet count 103,000, AST 34, ALT 41    9/24/2024  T98.1, pulse 100, respirations 18, /66  Patient feeling about the same, no new symptoms.  H&H 12.7 and 37.5, white count of 14,060, platelet count 102,000, BUN/creatinine of 14  and 0.43, BUN 3.5, total protein 5.9, AST 33, ALT 44  Bronchoscopy 9/23/2024 without endobronchial lesions seen, left lung and lower lobes patent without lesions, left upper lobe completely occluded with abnormal friable overlying tissue with biopsy obtained in JAZMINE demonstrated abnormal cells.  On bronchoscopy was unable to access the left lower lobe for more peripheral lesions, endobronchial ultrasound introduced and mediastinal survey with trans bronchial needle biopsies station 4L, 10L and 11L with JAZMINE demonstrating malignant cells at 11L.  This is an interlobar node,  Path is currently pending.  The patient lives in [facility] and we may have to find closer oncology care.    9/25/2024  T90.1, pulse 70, respirations 20, /76  Patient wishes to be discharged today.  H&H 11.6 and 35.9, white count of 14,600, platelet count 100,000, being creatinine of 14 and 0.53, AST 43, ALT 59  Pathology per left upper lobe endobronchial biopsy positive for small cell carcinoma, FNA of station 11 positive for small cell carcinoma, station 7 negative, station 4L also positive for atypical cells-small cell carcinoma    The findings were discussed with the patient who indicates, while she understands she needs chemotherapy, but she would like to be treated closer to home-[facility] in [place] would be preferable.      Past Medical History, Past Surgical History, Social History, Family History have been reviewed and are without significant changes except as mentioned.    Review of Systems   A comprehensive 14 point review of systems was performed and was negative except as mentioned.    Medications:  The current medication list was reviewed in the EMR    ALLERGIES:  No Known Allergies    Objective      Vitals:    09/24/24 1944 09/24/24 1956 09/24/24 2332 09/25/24 0740   BP:  161/70 150/75 148/76   BP Location:  Left arm Right arm Left arm   Patient Position:  Sitting Lying Lying   Pulse: 107   78    Resp: 18 22 20 20   Temp:  97.9 °F (36.6 °C) 97.9 °F (36.6 °C) 98.1 °F (36.7 °C)   TempSrc:  Oral Oral Oral   SpO2: 94%   94%   Weight:       Height:              No data to display                Physical Exam    CONSTITUTIONAL:  Vital signs reviewed.  No distress, looks comfortable.  EYES:  Conjunctiva and lids unremarkable.    EARS,NOSE,MOUTH,THROAT:  Ears and nose appear unremarkable.    RESPIRATORY:  Normal respiratory effort.    CARDIOVASCULAR:  Normal heart rate  GASTROINTESTINAL: Abdomen appears unremarkable.  Nondistended   LYMPHATIC:  No cervical, supraclavicular lymphadenopathy.  SKIN:  Warm.  No rashes.  PSYCHIATRIC:  Normal judgment and insight.  Normal mood and affect.  NEURO: AAOx3, no obvious focal deficits.      RECENT LABS:  Hematology WBC   Date Value Ref Range Status   09/25/2024 14.60 (H) 3.40 - 10.80 10*3/mm3 Final     RBC   Date Value Ref Range Status   09/25/2024 4.13 3.77 - 5.28 10*6/mm3 Final     Hemoglobin   Date Value Ref Range Status   09/25/2024 11.6 (L) 12.0 - 15.9 g/dL Final     Hematocrit   Date Value Ref Range Status   09/25/2024 35.9 34.0 - 46.6 % Final     Platelets   Date Value Ref Range Status   09/25/2024 100 (L) 140 - 450 10*3/mm3 Final              Assessment & Plan   Patient is a pleasant 64-year-old female with medical history significant for COPD and history of SIADH/hyponatremia admitted with hypokalemia and weight loss.  Hematology/oncology consulted for suspected lung malignancy.       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    Has > 40-pack-year smoking history  The CT C/A/P demonstrated left perihilar mass, 3.6 x 2.1 cm with significant left hilar and mediastinal lymphadenopathy, prevascular doreen conglomerate measuring 4.3 x 2.2 cm.  Left hilar lymphadenopathy causes severe narrowing of the IRIS proximal  segmental bronchi.  Numerous (greater than 20) hypoenhancing bilobar hepatic lesions, largest 2.4 cm in hepatic segment 4A.  Normal bilateral adrenal glands.  No abdominal or pelvic lymphadenopathy.  MRI brain negative for metastasis  9/20/2024: I reviewed the imaging and laboratory findings with patient in detail.  Informed her that they are highly concerning for primary lung malignancy.  Plan made to consult pulmonology for bronchoscopy and biopsy of the hilar/mediastinal lymph nodes.  9/22/24: Seen by pulmonology.  Plan for bronchoscopy and biopsy on Monday, 9/23.  I again reviewed her imaging findings and tried calling her daughter to go over the management plan.  Further plan based on pathologic findings.  The patient lives in [facility] and has difficulty in transportation- disabled .  Pathology per left upper lobe endobronchial biopsy positive for small cell carcinoma, FNA of station 11 positive for small cell carcinoma, station 7 negative, station 4L also positive for atypical cells-small cell carcinoma  The findings were discussed with the patient who indicates, while she understands she needs chemotherapy, but she would like to be treated closer to home-[facility] in [place] would be preferable.    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  Nephrology following     # ?  Obstructive pneumonia: On antibiotics per primary    # Hypertensive urgency:  SBP in 190s.  Suspect primary hyperaldosteronism related  Started on spironolactone and hydralazine.  Nephrology managing    # Mild thrombocytopenia: Monitor     Recommendations:  -We have started the referral process for the patient to be seen at [facility] in [facility]-next available appointment  *No objection to to discharge today.  I will try to  have an appointment in place before she is discharged but the referral already been made and the patient is like to be contacted by the facility.    She now has an appointment at [facility], 10/8/2024 at 9:30 AM.  She is asked to arrive at 9 AM with Dr. Archie Grimaldo MD    [address] [facility] [place] [address], [phone]       Electronically signed by Preston Nevarez MD at 09/25/24 1307       Govind Mar MD at 09/24/24 1538            Consult Daily Progress Note  [facility]   09/24/24      Patient Name:  Kranthi Farley  MRN:  [id number]   YOB: 1960  Age: 64 y.o.  Sex: female  LOS: 5    Reason for Consult:  Left upper lobe mass    Interval History:  No acute events overnight  Tolerated bronchoscopy well  No significant cough or hemoptysis  No chest pain or palpitations  No nausea vomiting    Physical Exam:  Vitals:    09/24/24 1455   BP:    Pulse: (!) 122   Resp: 18   Temp:    SpO2: 95%       Intake/Output    Intake/Output Summary (Last 24 hours) at 9/24/2024 1538  Last data filed at 9/23/2024 2200  Gross per 24 hour   Intake 280 ml   Output --   Net 280 ml       General: Alert, nontoxic, NAD  HEENT: NC/AT, EOMI, MMM  Neck: Supple, trachea midline  Cardiac: RRR, no murmur, gallops, rubs  Pulmonary: Diminished bilaterally  GI: Soft, non-tender, non-distended, normal bowel sounds  Extremities: Warm, well perfused, no LE edema  Skin: no visible rash  Neuro: CN II - XII grossly intact  Psychiatry: Normal mood and affect    Data Review:  Results from last 7 days   Lab Units 09/24/24  0726 09/23/24  0448 09/22/24  0258 09/21/24  0351 09/20/24  1003 09/19/24 2006 09/19/24  0925   WBC 10*3/mm3 14.06* 13.14* 12.52* 14.19* 13.70* 17.19* 19.98*   HEMOGLOBIN g/dL 12.7 13.0 13.0 12.6 12.3 14.2 14.6   PLATELETS 10*3/mm3 102* 103* 115* 106* 104* 109* 129*     Results from last 7 days   Lab Units 09/24/24  0726 09/23/24  1906 09/23/24  0448 09/22/24  0258  09/21/24  1705 09/21/24  0351 09/20/24  1003 09/19/24 2006   SODIUM mmol/L 134*  --  134* 136  --  141 142 142   POTASSIUM mmol/L 3.3* 2.9* 2.8* 3.9 3.5 3.5 3.0* 2.3*   CHLORIDE mmol/L 96*  --  92* 95*  --  99 96* 92*   CO2 mmol/L 28.0  --  31.0* 31.2*  --  34.3* 37.4* 39.1*   BUN mg/dL 14  --  16 11  --  11 18 19   CREATININE mg/dL 0.43*  --  0.55* 0.55*  --  0.50* 0.65 0.67   GLUCOSE mg/dL 111*  --  129* 153*  --  142* 166* 190*   CALCIUM mg/dL 8.9  --  9.1 9.2  --  8.9 9.0 9.6   MAGNESIUM mg/dL  --   --   --   --   --   --  2.0 2.0   Estimated Creatinine Clearance: 141.9 mL/min (A) (by C-G formula based on SCr of 0.43 mg/dL (L)).    Results from last 7 days   Lab Units 09/24/24  0726 09/23/24  0448 09/22/24  0258 09/20/24  1003 09/20/24  0001 09/19/24 2006   AST (SGOT) U/L 33* 34* 37*   < >  --  31   ALT (SGPT) U/L 44* 41* 45*   < >  --  46*   PROCALCITONIN ng/mL  --   --   --   --   --  1.75*   LACTATE mmol/L  --   --   --   --  1.2  --    PLATELETS 10*3/mm3 102* 103* 115*   < >  --  109*    < > = values in this interval not displayed.               Imaging:  Reviewed chest images personally from past 3 days    ASSESSMENT  /  PLAN:    Left perihilar mass  Mediastinal and hilar lymphadenopathy  Paraneoplastic syndrome, primary hyperaldosteronism  Postobstructive pneumonia  Electrolyte derangements (hypokalemia, hypochloremia)  Metabolic alkalosis  Primary hyperaldosteronism  Leukocytosis  Thrombocytopenia  Hypertension  Unintentional weight loss  History of tobacco abuse (greater than 40-pack-year)     -Patient presented with laboratory abnormalities concerning for paraneoplastic syndrome with CT imaging demonstrating left perihilar mass with possible superimposed obstructive pneumonia.  -High risk for malignancy especially with her significant smoking history and recent weight loss.  - Patient underwent bronchoscopy which demonstrated occlusion of the left upper lobe with endobronchial biopsies and EBUS/TBNA  performed.  JAZMINE demonstrated atypical cells concerning for neuroendocrine versus lymphoma.  Final pathology pending.  -Completed course of empiric antibiotics (EOT 9/23)  -Continue bronchodilator therapy.  -MRI brain without any acute intracranial process.    Thank you for allowing us to participate in this patients care. Pulmonary will continue to follow.     Govind Mar MD  [facility]  Pulmonary and Critical Care Medicine, Interventional Pulmonology    Parts of this note may be an electronic transcription/translation of spoken language to printed text using the Dragon dictation system.         Electronically signed by Govind Mar MD at 09/25/24 2508       Preston Nevarez MD at 09/24/24 8001                HISTORY OF PRESENT ILLNESS:   The patient is a 64 y.o. year old female with medical history significant for extensive tobacco abuse (> 40 pack years), COPD and history of SIADH/hyponatremia had presented to [facility] ER 9/19/2024 with abnormal labs and weight loss of 40 pounds.  Patient had routine lab work performed through her PCP and directed to come to the ER.    Lab work in the ER noted severe hypokalemia (2.3), metabolic alkalosis (CO2 39.1, chloride 92), normal LFTs (total bilirubin 1.3, ALT 46, ), leukocytosis of 17.19 and thrombocytopenia of 129,000.  Normal sodium (142) and creatinine.  Patient was hypertensive with BP of 197/104.  Otherwise normal vitals.     A CT C/A/P demonstrated left perihilar mass, 3.6 x 2.1 cm with significant left hilar and mediastinal lymphadenopathy, prevascular doreen conglomerate measuring 4.3 x 2.2 cm.  Left hilar lymphadenopathy causes severe narrowing of the IRIS proximal segmental bronchi.  Numerous (greater than 20) hypoenhancing bilobar hepatic lesions, largest 2.4 cm in hepatic segment 4A.  Normal bilateral adrenal glands.  No abdominal or pelvic lymphadenopathy.     Hematology/oncology has been consulted for further  evaluation and management seen 9/20/2024    Interval history:  9/22/2024  No acute issues overnight.  Patient sitting comfortably in bed.  Blood pressure remains elevated.  Patient denies any headaches, chest pain or lightheadedness.      9/23/2024  T97.3, pulse 90, respirations 16, /99  Bronchoscopy planned 9/23/2024-patient now being transported  H&H 13.0 and 30.1, white count of 13,140, platelet count 103,000, AST 34, ALT 41    9/24/2024  T98.1, pulse 100, respirations 18, /66  Patient feeling about the same, no new symptoms.  H&H 12.7 and 37.5, white count of 14,060, platelet count 102,000, BUN/creatinine of 14 and 0.43, BUN 3.5, total protein 5.9, AST 33, ALT 44  Bronchoscopy 9/23/2024 without endobronchial lesions seen, left lung and lower lobes patent without lesions, left upper lobe completely occluded with abnormal friable overlying tissue with biopsy obtained in JAZMINE demonstrated abnormal cells.  On bronchoscopy was unable to access the left lower lobe for more peripheral lesions, endobronchial ultrasound introduced and mediastinal survey with trans bronchial needle biopsies station 4L, 10L and 11L with JAZMINE demonstrating malignant cells at 11L.  This is an interlobar node,  Path is currently pending.  The patient lives in [facility] and we may have to find closer oncology care.      Past Medical History, Past Surgical History, Social History, Family History have been reviewed and are without significant changes except as mentioned.    Review of Systems   A comprehensive 14 point review of systems was performed and was negative except as mentioned.    Medications:  The current medication list was reviewed in the EMR    ALLERGIES:  No Known Allergies    Objective      Vitals:    09/24/24 0703 09/24/24 0725 09/24/24 0916 09/24/24 1040   BP:  134/58 142/66    BP Location:  Left arm     Patient Position:  Sitting     Pulse: 98 107 100 101   Resp: 20 20  18   Temp:  98.1 °F (36.7 °C)      TempSrc:  Oral     SpO2: 93% 94%  95%   Weight:       Height:              No data to display                Physical Exam    CONSTITUTIONAL:  Vital signs reviewed.  No distress, looks comfortable.  EYES:  Conjunctiva and lids unremarkable.    EARS,NOSE,MOUTH,THROAT:  Ears and nose appear unremarkable.    RESPIRATORY:  Normal respiratory effort.    CARDIOVASCULAR:  Normal heart rate  GASTROINTESTINAL: Abdomen appears unremarkable.  Nondistended   LYMPHATIC:  No cervical, supraclavicular lymphadenopathy.  SKIN:  Warm.  No rashes.  PSYCHIATRIC:  Normal judgment and insight.  Normal mood and affect.  NEURO: AAOx3, no obvious focal deficits.      RECENT LABS:  Hematology WBC   Date Value Ref Range Status   09/24/2024 14.06 (H) 3.40 - 10.80 10*3/mm3 Final     RBC   Date Value Ref Range Status   09/24/2024 4.47 3.77 - 5.28 10*6/mm3 Final     Hemoglobin   Date Value Ref Range Status   09/24/2024 12.7 12.0 - 15.9 g/dL Final     Hematocrit   Date Value Ref Range Status   09/24/2024 37.5 34.0 - 46.6 % Final     Platelets   Date Value Ref Range Status   09/24/2024 102 (L) 140 - 450 10*3/mm3 Final              Assessment & Plan   Patient is a pleasant 64-year-old female with medical history significant for COPD and history of SIADH/hyponatremia admitted with hypokalemia and weight loss.  Hematology/oncology consulted for suspected lung malignancy.       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    Has > 40-pack-year smoking history  The CT C/A/P demonstrated left perihilar mass, 3.6 x 2.1 cm with significant left hilar and mediastinal lymphadenopathy, prevascular doreen conglomerate measuring 4.3 x 2.2 cm.  Left hilar lymphadenopathy causes severe narrowing of the IRIS proximal segmental bronchi.  Numerous (greater than 20) hypoenhancing bilobar hepatic lesions, largest  2.4 cm in hepatic segment 4A.  Normal bilateral adrenal glands.  No abdominal or pelvic lymphadenopathy.  MRI brain negative for metastasis  9/20/2024: I reviewed the imaging and laboratory findings with patient in detail.  Informed her that they are highly concerning for primary lung malignancy.  Plan made to consult pulmonology for bronchoscopy and biopsy of the hilar/mediastinal lymph nodes.  9/22/24: Seen by pulmonology.  Plan for bronchoscopy and biopsy on Monday, 9/23.  I again reviewed her imaging findings and tried calling her daughter to go over the management plan.  Further plan based on pathologic findings.  The patient lives in [facility] and has difficulty in transportation- disabled .     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  Nephrology following     # ?  Obstructive pneumonia: On antibiotics per primary    # Hypertensive urgency:  SBP in 190s.  Suspect primary hyperaldosteronism related  Started on spironolactone and hydralazine.  Nephrology managing    # Mild thrombocytopenia: Monitor     Recommendations:  -Pulmonology bronchoscopy and biopsy 9/23/2024 pending  -Spironolactone and hydralazine per nephrology  -Plan for outpatient PET/CT  -Will continue to follow but will discuss with patient about available oncology care closer to home and possibly make this referral.         Electronically signed by Preston Nevarez MD at 09/24/24 1246       Armand Castle MD at 09/24/24 1035             LOS: 5 days     Chief Complaint/ Reason for encounter: Hypertension, hyperaldosteronism    Subjective   09/21/24 : Patient is doing well today with no new complaints  Good appetite with no nausea or vomiting  No shortness of breath chest pain or edema  Voiding well with no dysuria    9/22: No new complaints or events, BP  "remains elevated but she is asymptomatic    9/23: Scheduled for bronchoscopy after potassium replacement  No new complaints today, n.p.o. for the procedure    9/24: No new complaints or events, blood pressure much better over the last 24 hours  Status post bronc with biopsy, path pending    Medical history reviewed:  History of Present Illness    Subjective    History taken from: Patient and chart    Vital Signs  Temp:  [97.9 °F (36.6 °C)-98.1 °F (36.7 °C)] 98.1 °F (36.7 °C)  Heart Rate:  [] 100  Resp:  [16-24] 20  BP: (134-220)/() 142/66       Wt Readings from Last 1 Encounters:   09/24/24 0614 68 kg (149 lb 14.4 oz)   09/23/24 0330 68.5 kg (151 lb)   09/22/24 0625 68.9 kg (151 lb 14.4 oz)   09/21/24 0632 69.6 kg (153 lb 8 oz)   09/20/24 0445 69.9 kg (154 lb 3.2 oz)   09/19/24 1944 69.9 kg (154 lb)       Objective:  Vital signs: (most recent): Blood pressure 142/66, pulse 101, temperature 98.1 °F (36.7 °C), temperature source Oral, resp. rate 18, height 185.4 cm (72.99\"), weight 68 kg (149 lb 14.4 oz), SpO2 95%.                Objective:  General Appearance:  Comfortable, well-appearing, in no acute distress and not in pain.  Awake, alert  HEENT: Mucous membranes moist, no injury, oropharynx clear  Lungs:  Normal effort and normal respiratory rate.  Breath sounds clear to auscultation.  No  respiratory distress.  No rales, decreased breath sounds or rhonchi.    Heart: Normal rate.  Regular rhythm.  S1, S2 normal.  No murmur.   Abdomen: Abdomen is soft.  Bowel sounds are normal, no abdominal tenderness.  There is no rebound or guarding  Extremities: No edema of bilateral lower extremities  Skin:  Warm and dry with no rashes      Results Review:    Intake/Output:     Intake/Output Summary (Last 24 hours) at 9/24/2024 1035  Last data filed at 9/23/2024 2200  Gross per 24 hour   Intake 280 ml   Output --   Net 280 ml           DATA:  Radiology and Labs:  The following labs independently reviewed by me. " Additional labs ordered for tomorrow a.m.  Interval notes, chart personally reviewed by me.   Old records independently reviewed showing longstanding smoking history, weight loss  Discussed with patient herself at bedside    Risk/ complexity of medical care/ medical decision making moderate complexity of the BP and electrolytes    Labs:   Recent Results (from the past 24 hour(s))   POC Glucose Once    Collection Time: 09/23/24 11:21 AM    Specimen: Blood   Result Value Ref Range    Glucose 123 70 - 130 mg/dL   Respiratory Culture - Aspirate, Bronchus    Collection Time: 09/23/24  2:23 PM    Specimen: Bronchus; Aspirate   Result Value Ref Range    Gram Stain Moderate (3+) WBCs seen     Gram Stain Few (2+) Epithelial cells seen     Gram Stain No organisms seen    ECG 12 Lead Chest Pain    Collection Time: 09/23/24  4:21 PM   Result Value Ref Range    QT Interval 345 ms    QTC Interval 452 ms   POC Glucose Once    Collection Time: 09/23/24  4:42 PM    Specimen: Blood   Result Value Ref Range    Glucose 169 (H) 70 - 130 mg/dL   Potassium    Collection Time: 09/23/24  7:06 PM    Specimen: Blood   Result Value Ref Range    Potassium 2.9 (L) 3.5 - 5.2 mmol/L   POC Glucose Once    Collection Time: 09/23/24  8:18 PM    Specimen: Blood   Result Value Ref Range    Glucose 125 70 - 130 mg/dL   POC Glucose Once    Collection Time: 09/24/24  5:53 AM    Specimen: Blood   Result Value Ref Range    Glucose 121 70 - 130 mg/dL   Comprehensive Metabolic Panel    Collection Time: 09/24/24  7:26 AM    Specimen: Blood   Result Value Ref Range    Glucose 111 (H) 65 - 99 mg/dL    BUN 14 8 - 23 mg/dL    Creatinine 0.43 (L) 0.57 - 1.00 mg/dL    Sodium 134 (L) 136 - 145 mmol/L    Potassium 3.3 (L) 3.5 - 5.2 mmol/L    Chloride 96 (L) 98 - 107 mmol/L    CO2 28.0 22.0 - 29.0 mmol/L    Calcium 8.9 8.6 - 10.5 mg/dL    Total Protein 5.9 (L) 6.0 - 8.5 g/dL    Albumin 3.5 3.5 - 5.2 g/dL    ALT (SGPT) 44 (H) 1 - 33 U/L    AST (SGOT) 33 (H) 1 - 32 U/L     Alkaline Phosphatase 109 39 - 117 U/L    Total Bilirubin 0.5 0.0 - 1.2 mg/dL    Globulin 2.4 gm/dL    A/G Ratio 1.5 g/dL    BUN/Creatinine Ratio 32.6 (H) 7.0 - 25.0    Anion Gap 10.0 5.0 - 15.0 mmol/L    eGFR 108.8 >60.0 mL/min/1.73   CBC Auto Differential    Collection Time: 09/24/24  7:26 AM    Specimen: Blood   Result Value Ref Range    WBC 14.06 (H) 3.40 - 10.80 10*3/mm3    RBC 4.47 3.77 - 5.28 10*6/mm3    Hemoglobin 12.7 12.0 - 15.9 g/dL    Hematocrit 37.5 34.0 - 46.6 %    MCV 83.9 79.0 - 97.0 fL    MCH 28.4 26.6 - 33.0 pg    MCHC 33.9 31.5 - 35.7 g/dL    RDW 14.5 12.3 - 15.4 %    RDW-SD 44.0 37.0 - 54.0 fl    MPV 8.7 6.0 - 12.0 fL    Platelets 102 (L) 140 - 450 10*3/mm3    Neutrophil % 79.2 (H) 42.7 - 76.0 %    Lymphocyte % 10.8 (L) 19.6 - 45.3 %    Monocyte % 4.7 (L) 5.0 - 12.0 %    Eosinophil % 0.1 (L) 0.3 - 6.2 %    Basophil % 0.2 0.0 - 1.5 %    Immature Grans % 5.0 (H) 0.0 - 0.5 %    Neutrophils, Absolute 11.12 (H) 1.70 - 7.00 10*3/mm3    Lymphocytes, Absolute 1.52 0.70 - 3.10 10*3/mm3    Monocytes, Absolute 0.66 0.10 - 0.90 10*3/mm3    Eosinophils, Absolute 0.02 0.00 - 0.40 10*3/mm3    Basophils, Absolute 0.03 0.00 - 0.20 10*3/mm3    Immature Grans, Absolute 0.71 (H) 0.00 - 0.05 10*3/mm3    nRBC 0.0 0.0 - 0.2 /100 WBC       Radiology:  Pertinent radiology studies were reviewed as described above      Medications have been reviewed separately in chart overview      ASSESSMENT:  Hypertension, new diagnosis, likely secondary to hyperaldosteronism    Hypokalemia    Leukocytosis    Thrombocytopenia    CAP (community acquired pneumonia)    Type 2 diabetes mellitus with hyperglycemia, without long-term current use of insulin    Pulmonary nodules  Probable metastatic lung cancer, needs pathology confirmation    Elevated blood pressure reading without diagnosis of hypertension           DISCUSSION/PLAN:   Blood pressure much improved, hopefully the spironolactone is reaching good steady state concentration with  much better BP readings over the last 24 hours  Spironolactone has a long half-life and can take 4 to 5 days for full effect, dose was just titrated on   Continue spironolactone at current dosage  Maintain hydralazine 50 mg 3 times daily  Continue potassium replacement as needed: Okay to use protocol for replacement with normal GFR    Likely metastatic lung cancer with paraneoplastic syndrome  Await renin and aldosterone levels which remain in pending status: These can sometimes take weeks to result  Bronchoscopy with biopsy , path pending.        Armand Castle MD  [facility]   Office phone number: [phone]  Answering service phone number: [phone]    24  10:35 EDT    Dictation performed using Dragon dictation software      Electronically signed by Armand Castle MD at 24 1310       Royer Prajapati DO at 24 1020              Name: Kranthi Farley ADMIT: 2024   : 1960  PCP: Alexandra Keller PA-C    MRN: [id number] LOS: 5 days   AGE/SEX: 64 y.o. female  ROOM: [facility]     Subjective   Subjective   Patient awake and resting in bed, doing okay today, still short of breath, had bronchoscopy on .    Objective   Objective   Vital Signs  Temp:  [97.7 °F (36.5 °C)-98.1 °F (36.7 °C)] 97.7 °F (36.5 °C)  Heart Rate:  [] 115  Resp:  [18-24] 18  BP: (134-220)/() 134/70  SpO2:  [88 %-99 %] 93 %  on  Flow (L/min):  [3-15] 3;   Device (Oxygen Therapy): room air  Body mass index is 19.78 kg/m².  Physical Exam  Vitals and nursing note reviewed.   Constitutional:       General: She is not in acute distress.  Cardiovascular:      Rate and Rhythm: Normal rate and regular rhythm.      Pulses: Normal pulses.      Heart sounds: Normal heart sounds.   Pulmonary:      Effort: Pulmonary effort is normal. No respiratory distress.      Breath sounds: No stridor. Decreased breath sounds present. No wheezing or rhonchi.   Abdominal:      General: Bowel sounds  are normal. There is no distension.      Palpations: Abdomen is soft.      Tenderness: There is no abdominal tenderness.   Musculoskeletal:      Right lower leg: No edema.      Left lower leg: No edema.   Skin:     General: Skin is warm and dry.   Neurological:      Mental Status: She is alert.       Results Review     I reviewed the patient's new clinical results.  Results from last 7 days   Lab Units 09/24/24 0726 09/23/24 0448 09/22/24 0258 09/21/24  0351   WBC 10*3/mm3 14.06* 13.14* 12.52* 14.19*   HEMOGLOBIN g/dL 12.7 13.0 13.0 12.6   PLATELETS 10*3/mm3 102* 103* 115* 106*     Results from last 7 days   Lab Units 09/24/24 0726 09/23/24  1906 09/23/24 0448 09/22/24 0258 09/21/24  1705 09/21/24  0351   SODIUM mmol/L 134*  --  134* 136  --  141   POTASSIUM mmol/L 3.3* 2.9* 2.8* 3.9   < > 3.5   CHLORIDE mmol/L 96*  --  92* 95*  --  99   CO2 mmol/L 28.0  --  31.0* 31.2*  --  34.3*   BUN mg/dL 14  --  16 11  --  11   CREATININE mg/dL 0.43*  --  0.55* 0.55*  --  0.50*   GLUCOSE mg/dL 111*  --  129* 153*  --  142*   EGFR mL/min/1.73 108.8  --  102.5 102.5  --  104.9    < > = values in this interval not displayed.     Results from last 7 days   Lab Units 09/24/24 0726 09/23/24 0448 09/22/24 0258 09/21/24  0351   ALBUMIN g/dL 3.5 3.7 3.8 3.6   BILIRUBIN mg/dL 0.5 0.6 0.6 0.6   ALK PHOS U/L 109 129* 122* 121*   AST (SGOT) U/L 33* 34* 37* 32   ALT (SGPT) U/L 44* 41* 45* 44*     Results from last 7 days   Lab Units 09/24/24 0726 09/23/24 0448 09/22/24 0258 09/21/24  0351 09/20/24  1003 09/19/24 2006   CALCIUM mg/dL 8.9 9.1 9.2 8.9 9.0 9.6   ALBUMIN g/dL 3.5 3.7 3.8 3.6  --  3.9   MAGNESIUM mg/dL  --   --   --   --  2.0 2.0     Results from last 7 days   Lab Units 09/20/24  0001 09/19/24 2006   PROCALCITONIN ng/mL  --  1.75*   LACTATE mmol/L 1.2  --      Glucose   Date/Time Value Ref Range Status   09/24/2024 1122 199 (H) 70 - 130 mg/dL Final   09/24/2024 0553 121 70 - 130 mg/dL Final   09/23/2024 2018 125 70 -  130 mg/dL Final   09/23/2024 1642 169 (H) 70 - 130 mg/dL Final   09/23/2024 1121 123 70 - 130 mg/dL Final   09/23/2024 0544 141 (H) 70 - 130 mg/dL Final   09/22/2024 2132 270 (H) 70 - 130 mg/dL Final       XR Chest 1 View    Result Date: 9/23/2024  No pneumothorax.  This report was finalized on 9/23/2024 3:38 PM by Dr. Ciaran Solis M.D on Workstation: [employer]       I have personally reviewed all medications:  Scheduled Medications  amLODIPine, 5 mg, Oral, Q24H  budesonide-formoterol, 2 puff, Inhalation, BID - RT  [Held by provider] enoxaparin, 40 mg, Subcutaneous, Q24H  hydrALAZINE, 50 mg, Oral, Q8H  insulin glargine, 2 Units, Subcutaneous, Nightly  insulin lispro, 2-7 Units, Subcutaneous, 4x Daily AC & at Bedtime  ipratropium-albuterol, 3 mL, Nebulization, 4x Daily - RT  piperacillin-tazobactam, 3.375 g, Intravenous, Q8H  potassium chloride ER, 40 mEq, Oral, Q4H  spironolactone, 100 mg, Oral, BID  tiotropium bromide monohydrate, 2 puff, Inhalation, Daily - RT    Infusions  Pharmacy to Dose Zosyn,       Diet  Diet: Cardiac, Diabetic; Healthy Heart (2-3 Na+); Consistent Carbohydrate; Fluid Consistency: Thin (IDDSI 0)    I have personally reviewed:  [x]  Laboratory   []  Microbiology   [x]  Radiology   [x]  EKG/Telemetry  []  Cardiology/Vascular   []  Pathology    []  Records      Assessment/Plan     Active Hospital Problems    Diagnosis  POA    **Hypokalemia [E87.6]  Yes    Essential hypertension [I10]  Yes    Transaminitis [R74.01]  Yes    Severe malnutrition [E43]  Yes    Pulmonary nodules [R91.8]  Yes    Abnormal CT of the chest [R93.89]  Yes    Leukocytosis [D72.829]  Yes    Thrombocytopenia [D69.6]  Yes    CAP (community acquired pneumonia) [J18.9]  Yes    Type 2 diabetes mellitus with hyperglycemia, without long-term current use of insulin [E11.65]  Yes      Resolved Hospital Problems   No resolved problems to display.       64 y.o. female admitted with Hypokalemia.    Pulmonary nodules  Perihilar  mass  Lymphadenopathy  Abnormal weight loss, tobacco use  - Patient with weight loss over past several months, coupled with ongoing tobacco use. CT Chest obtained, results have returned (09/20), impression suggesting primary left upper lobe malignancy with doreen (left hilar, mediastinal and left cervical), pulmonary, hepatic and osseous metastatic disease. Also noting multiple pulmonary nodules, and as above mass/adenopathy, tissue sampling recommended. MRI brain obtained, no acute intracranial abnormalities noted.  - Oncology and Pulmonology consulted, notes reviewed. Patient s/p bronchoscopy (09/23), path pending. Oncology planning for outpatient PET/CT. Will continue to follow their plans/recommendations. Greatly appreciate their help.    Pneumonia  Leukocytosis  - Findings of pneumonia noted on imaging, coupled with leukocytosis, elevated procalcitonin. UA is also abnormal, but patient without urinary complaints.  RVP/MRSA/strep/legionella negative, blood cultures no growth thus far. WBC remains elevated on most recent labs, slightly worse, she is also more tachycardic today.  - Change antibiotics to Zosyn. Order EKG for tachycardia.    Elevated blood pressure without prior diagnosis of hypertension, newly diagnosed hypertension  Hypokalemia  Metabolic alkalosis  - Elevated BP noted, patient not on antihypertensive medications previously, multiple elevations noted, will classify as newly diagnosed hypertension. Potassium low, metabolic alkalosis noted, concern for possible hyperaldosteronism.  - Aldosterone/renin pending. Nephrology consulted and following, Discussed with Dr. Castle, given that she has newly diagnosed hypertension, coupled with hypokalemia and metabolic alkalosis, strong suspicion for hyperaldosteronism. She has been started on Aldactone, remains on this at present, tolerating well.   - Potassium low again today, will replete per protocol. Alkalosis still noted.  - BP better controlled with  additions of Hydralazine and Amlodipine. Continue these as prescribed.   - Will continue to follow Nephrology plans/recommendations. Greatly appreciate their help.    Type 2 diabetes mellitus with hyperglycemia  - Most recent A1c: 7.50%  - Current treatment regimen: Glargine 2 units nightly, SSI; Glucose mostly acceptable.  - Continue medications as prescribed for now.  - Will monitor 24 hour insulin requirements and adjust as needed to achieve target inpatient range of 140-180.  - Diabetic diet    Thrombocytopenia  - Platelets found to be low on most recent labs, values relatively stable. No indications for urgent intervention at present. Will monitor for now.  - Order repeat CBC in AM for reassessment.    COPD  - No evidence to suggest acute exacerbation.  Continue current medications as prescribed and closely monitor.  - Supplemental oxygen as needed to maintain O2 sats 88 to 92%, pulse oximetry, telemetry monitoring.    Transaminitis  - Noted on labs, values remain elevated, but flat on most recent testing, but abdominal exam benign, CT showing abnormal findings involving liver, she does not appear to have any medications that are contributing. Monitor for now with CMP. Consider further evaluation as clinically indicated.    SCDs for DVT prophylaxis.  Full code.  Discussed with patient, nursing staff, consulting provider, CCP, and care team on multidisciplinary rounds.  Anticipate discharge home when cleared by consultants.  Expected Discharge Date: 9/23/2024; Expected Discharge Time:       Royer Prajapati DO  [facility]  09/24/24        Electronically signed by Royer Prajapati DO at 09/24/24 1958

## 2024-09-25 NOTE — PROGRESS NOTES
HISTORY OF PRESENT ILLNESS:   The patient is a 64 y.o. year old female with medical history significant for extensive tobacco abuse (> 40 pack years), COPD and history of SIADH/hyponatremia had presented to [facility] ER 9/19/2024 with abnormal labs and weight loss of 40 pounds.  Patient had routine lab work performed through her PCP and directed to come to the ER.    Lab work in the ER noted severe hypokalemia (2.3), metabolic alkalosis (CO2 39.1, chloride 92), normal LFTs (total bilirubin 1.3, ALT 46, ), leukocytosis of 17.19 and thrombocytopenia of 129,000.  Normal sodium (142) and creatinine.  Patient was hypertensive with BP of 197/104.  Otherwise normal vitals.     A CT C/A/P demonstrated left perihilar mass, 3.6 x 2.1 cm with significant left hilar and mediastinal lymphadenopathy, prevascular doreen conglomerate measuring 4.3 x 2.2 cm.  Left hilar lymphadenopathy causes severe narrowing of the IRIS proximal segmental bronchi.  Numerous (greater than 20) hypoenhancing bilobar hepatic lesions, largest 2.4 cm in hepatic segment 4A.  Normal bilateral adrenal glands.  No abdominal or pelvic lymphadenopathy.     Hematology/oncology has been consulted for further evaluation and management seen 9/20/2024    Interval history:  9/22/2024  No acute issues overnight.  Patient sitting comfortably in bed.  Blood pressure remains elevated.  Patient denies any headaches, chest pain or lightheadedness.      9/23/2024  T97.3, pulse 90, respirations 16, /99  Bronchoscopy planned 9/23/2024-patient now being transported  H&H 13.0 and 30.1, white count of 13,140, platelet count 103,000, AST 34, ALT 41    9/24/2024  T98.1, pulse 100, respirations 18, /66  Patient feeling about the same, no new symptoms.  H&H 12.7 and 37.5, white count of 14,060, platelet count 102,000, BUN/creatinine of 14 and 0.43, BUN 3.5, total protein 5.9, AST 33, ALT 44  Bronchoscopy 9/23/2024 without endobronchial lesions  seen, left lung and lower lobes patent without lesions, left upper lobe completely occluded with abnormal friable overlying tissue with biopsy obtained in JAZMINE demonstrated abnormal cells.  On bronchoscopy was unable to access the left lower lobe for more peripheral lesions, endobronchial ultrasound introduced and mediastinal survey with trans bronchial needle biopsies station 4L, 10L and 11L with JAZMINE demonstrating malignant cells at 11L.  This is an interlobar node,  Path is currently pending.  The patient lives in [facility] and we may have to find closer oncology care.    9/25/2024  T90.1, pulse 70, respirations 20, /76  Patient wishes to be discharged today.  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      Past Medical History, Past Surgical History, Social History, Family History have been reviewed and are without significant changes except as mentioned.    Review of Systems   A comprehensive 14 point review of systems was performed and was negative except as mentioned.    Medications:  The current medication list was reviewed in the EMR    ALLERGIES:  No Known Allergies    Objective      Vitals:    09/24/24 1944 09/24/24 1956 09/24/24 2332 09/25/24 0740   BP:  161/70 150/75 148/76   BP Location:  Left arm Right arm Left arm   Patient Position:  Sitting Lying Lying   Pulse: 107   78   Resp: 18 22 20 20   Temp:  97.9 °F (36.6 °C) 97.9 °F (36.6 °C) 98.1 °F (36.7 °C)   TempSrc:  Oral Oral Oral    SpO2: 94%   94%   Weight:       Height:              No data to display                Physical Exam    CONSTITUTIONAL:  Vital signs reviewed.  No distress, looks comfortable.  EYES:  Conjunctiva and lids unremarkable.    EARS,NOSE,MOUTH,THROAT:  Ears and nose appear unremarkable.    RESPIRATORY:  Normal respiratory effort.    CARDIOVASCULAR:  Normal heart rate  GASTROINTESTINAL: Abdomen appears unremarkable.  Nondistended   LYMPHATIC:  No cervical, supraclavicular lymphadenopathy.  SKIN:  Warm.  No rashes.  PSYCHIATRIC:  Normal judgment and insight.  Normal mood and affect.  NEURO: AAOx3, no obvious focal deficits.      RECENT LABS:  Hematology WBC   Date Value Ref Range Status   09/25/2024 14.60 (H) 3.40 - 10.80 10*3/mm3 Final     RBC   Date Value Ref Range Status   09/25/2024 4.13 3.77 - 5.28 10*6/mm3 Final     Hemoglobin   Date Value Ref Range Status   09/25/2024 11.6 (L) 12.0 - 15.9 g/dL Final     Hematocrit   Date Value Ref Range Status   09/25/2024 35.9 34.0 - 46.6 % Final     Platelets   Date Value Ref Range Status   09/25/2024 100 (L) 140 - 450 10*3/mm3 Final              Assessment & Plan   Patient is a pleasant 64-year-old female with medical history significant for COPD and history of SIADH/hyponatremia admitted with hypokalemia and weight loss.  Hematology/oncology consulted for suspected lung malignancy.       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    Has > 40-pack-year smoking history  The CT C/A/P demonstrated left perihilar mass, 3.6 x 2.1 cm with significant left hilar and mediastinal lymphadenopathy, prevascular doreen conglomerate measuring 4.3 x 2.2 cm.  Left hilar lymphadenopathy causes severe narrowing of the IRIS proximal segmental bronchi.  Numerous (greater than 20) hypoenhancing bilobar hepatic lesions, largest 2.4 cm in hepatic  segment 4A.  Normal bilateral adrenal glands.  No abdominal or pelvic lymphadenopathy.  MRI brain negative for metastasis  9/20/2024: I reviewed the imaging and laboratory findings with patient in detail.  Informed her that they are highly concerning for primary lung malignancy.  Plan made to consult pulmonology for bronchoscopy and biopsy of the hilar/mediastinal lymph nodes.  9/22/24: Seen by pulmonology.  Plan for bronchoscopy and biopsy on Monday, 9/23.  I again reviewed her imaging findings and tried calling her daughter to go over the management plan.  Further plan based on pathologic findings.  The patient lives in [place] and has difficulty in transportation- disabled .  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  Nephrology following     # ?  Obstructive pneumonia: On antibiotics per primary    # Hypertensive urgency:  SBP in 190s.  Suspect primary hyperaldosteronism related  Started on spironolactone and hydralazine.  Nephrology managing    # Mild thrombocytopenia: Monitor     Recommendations:  -We have started the referral process for the patient to be seen at [facility] in [facility]-next available appointment  *No objection to to discharge today.  I will try to have an appointment in place before she is discharged but the referral already been made and the patient is like  to be contacted by the facility.    She now has an appointment at [facility], 10/8/2024 at 9:30 AM.  She is asked to arrive at 9 AM with Dr. Archie Grimaldo MD    [address] [facility] [place] [address], [phone]

## 2024-09-25 NOTE — PLAN OF CARE
Problem: Adult Inpatient Plan of Care  Goal: Plan of Care Review  Outcome: Met  Goal: Patient-Specific Goal (Individualized)  Outcome: Met  Goal: Absence of Hospital-Acquired Illness or Injury  Outcome: Met  Intervention: Identify and Manage Fall Risk  Recent Flowsheet Documentation  Taken 9/25/2024 1430 by Eunice Martin RN  Safety Promotion/Fall Prevention:   activity supervised   assistive device/personal items within reach   clutter free environment maintained   fall prevention program maintained   room organization consistent   nonskid shoes/slippers when out of bed   safety round/check completed  Taken 9/25/2024 1200 by Eunice Martin RN  Safety Promotion/Fall Prevention:   activity supervised   clutter free environment maintained   assistive device/personal items within reach   fall prevention program maintained   nonskid shoes/slippers when out of bed   room organization consistent   safety round/check completed  Taken 9/25/2024 1000 by Eunice Martin RN  Safety Promotion/Fall Prevention:   activity supervised   assistive device/personal items within reach   clutter free environment maintained   fall prevention program maintained   nonskid shoes/slippers when out of bed   room organization consistent   safety round/check completed  Taken 9/25/2024 0850 by Eunice Martin RN  Safety Promotion/Fall Prevention:   activity supervised   assistive device/personal items within reach   clutter free environment maintained   fall prevention program maintained   nonskid shoes/slippers when out of bed   room organization consistent   safety round/check completed  Intervention: Prevent Skin Injury  Recent Flowsheet Documentation  Taken 9/25/2024 1430 by Eunice Martin RN  Body Position: position changed independently  Skin Protection: adhesive use limited  Taken 9/25/2024 1200 by Eunice Martin RN  Body Position: position changed independently  Taken 9/25/2024 1000 by Eunice Martin RN  Body Position: position  changed independently  Taken 9/25/2024 0850 by Eunice Martin RN  Body Position: position changed independently  Skin Protection: adhesive use limited  Intervention: Prevent and Manage VTE (Venous Thromboembolism) Risk  Recent Flowsheet Documentation  Taken 9/25/2024 1430 by Eunice Martin RN  Activity Management: up ad joseph  VTE Prevention/Management: patient refused intervention  Range of Motion: active ROM (range of motion) encouraged  Taken 9/25/2024 1200 by Eunice Martin RN  Activity Management: up ad joseph  Taken 9/25/2024 1000 by Eunice Martin RN  Activity Management: up ad joseph  Taken 9/25/2024 0850 by Eunice Martin RN  Activity Management: up ad joseph  VTE Prevention/Management: patient refused intervention  Range of Motion: active ROM (range of motion) encouraged  Intervention: Prevent Infection  Recent Flowsheet Documentation  Taken 9/25/2024 1430 by Eunice Martin RN  Infection Prevention:   rest/sleep promoted   single patient room provided   hand hygiene promoted  Taken 9/25/2024 1200 by Eunice Martin RN  Infection Prevention:   single patient room provided   rest/sleep promoted   hand hygiene promoted  Taken 9/25/2024 1000 by Eunice Martin RN  Infection Prevention:   single patient room provided   rest/sleep promoted   hand hygiene promoted  Taken 9/25/2024 0850 by Eunice Martin RN  Infection Prevention:   single patient room provided   rest/sleep promoted   hand hygiene promoted  Goal: Optimal Comfort and Wellbeing  Outcome: Met  Intervention: Monitor Pain and Promote Comfort  Recent Flowsheet Documentation  Taken 9/25/2024 1430 by Eunice Martin RN  Pain Management Interventions:   care clustered   quiet environment facilitated   relaxation techniques promoted  Taken 9/25/2024 0850 by Eunice Martin RN  Pain Management Interventions:   care clustered   quiet environment facilitated   relaxation techniques promoted  Intervention: Provide Person-Centered Care  Recent Flowsheet  Documentation  Taken 9/25/2024 1430 by Eunice Martin RN  Trust Relationship/Rapport:   care explained   choices provided   emotional support provided   empathic listening provided   questions answered   questions encouraged   reassurance provided   thoughts/feelings acknowledged  Taken 9/25/2024 0850 by Eunice Martin RN  Trust Relationship/Rapport:   care explained   empathic listening provided   thoughts/feelings acknowledged   reassurance provided   questions encouraged   questions answered   emotional support provided   choices provided  Goal: Readiness for Transition of Care  Outcome: Met     Problem: Electrolyte Imbalance  Goal: Electrolyte Balance  Outcome: Met  Intervention: Monitor and Manage Electrolyte Imbalance  Recent Flowsheet Documentation  Taken 9/25/2024 1430 by Eunice Martin RN  Fluid/Electrolyte Management: fluids provided  Taken 9/25/2024 0850 by Eunice Martin RN  Fluid/Electrolyte Management: fluids provided     Problem: Malnutrition  Goal: Improved Nutritional Intake  Outcome: Met  Intervention: Promote and Optimize Oral Intake  Recent Flowsheet Documentation  Taken 9/25/2024 1430 by Eunice Martin RN  Oral Nutrition Promotion: rest periods promoted  Taken 9/25/2024 0850 by Eunice Martin RN  Oral Nutrition Promotion: rest periods promoted     Problem: Fall Injury Risk  Goal: Absence of Fall and Fall-Related Injury  Outcome: Met  Intervention: Identify and Manage Contributors  Recent Flowsheet Documentation  Taken 9/25/2024 1430 by Eunice Martin RN  Medication Review/Management: medications reviewed  Taken 9/25/2024 1200 by Eunice Martin RN  Medication Review/Management: medications reviewed  Taken 9/25/2024 1000 by Eunice Martin RN  Medication Review/Management: medications reviewed  Taken 9/25/2024 0850 by Eunice Martin RN  Medication Review/Management: medications reviewed  Intervention: Promote Injury-Free Environment  Recent Flowsheet Documentation  Taken 9/25/2024 1430 by  Eunice Martin, RN  Safety Promotion/Fall Prevention:   activity supervised   assistive device/personal items within reach   clutter free environment maintained   fall prevention program maintained   room organization consistent   nonskid shoes/slippers when out of bed   safety round/check completed  Taken 9/25/2024 1200 by Eunice Martin, RN  Safety Promotion/Fall Prevention:   activity supervised   clutter free environment maintained   assistive device/personal items within reach   fall prevention program maintained   nonskid shoes/slippers when out of bed   room organization consistent   safety round/check completed  Taken 9/25/2024 1000 by Eunice Martin, RN  Safety Promotion/Fall Prevention:   activity supervised   assistive device/personal items within reach   clutter free environment maintained   fall prevention program maintained   nonskid shoes/slippers when out of bed   room organization consistent   safety round/check completed  Taken 9/25/2024 0850 by Eunice Martin, RN  Safety Promotion/Fall Prevention:   activity supervised   assistive device/personal items within reach   clutter free environment maintained   fall prevention program maintained   nonskid shoes/slippers when out of bed   room organization consistent   safety round/check completed   Goal Outcome Evaluation:

## 2024-09-25 NOTE — DISCHARGE SUMMARY
Patient Name: Kranthi Farley  : 1960  MRN: [id number]    Date of Admission: 2024  Date of Discharge:  2024  Primary Care Physician: Alexandra Keller PA-C      Chief Complaint:   Abnormal Lab      Discharge Diagnoses     Active Hospital Problems    Diagnosis  POA    **Hypokalemia [E87.6]  Yes    Essential hypertension [I10]  Yes    Transaminitis [R74.01]  Yes    Severe malnutrition [E43]  Yes    Pulmonary nodules [R91.8]  Yes    Abnormal CT of the chest [R93.89]  Yes    Leukocytosis [D72.829]  Yes    Thrombocytopenia [D69.6]  Yes    CAP (community acquired pneumonia) [J18.9]  Yes    Type 2 diabetes mellitus with hyperglycemia, without long-term current use of insulin [E11.65]  Yes      Resolved Hospital Problems   No resolved problems to display.        Hospital Course     Ms. Farley is a 64 y.o. female who presented to [facility] initially complaining of weakness, dyspnea, weight loss.  Please see the admitting history and physical for further details.  She was admitted to the hospital for further evaluation and treatment.     Pulmonary nodules  Perihilar mass  Lymphadenopathy  Abnormal weight loss, tobacco use  Small cell lung cancer  - Patient with weight loss over past several months, coupled with ongoing tobacco use. CT Chest obtained, results have returned (), impression suggesting primary left upper lobe malignancy with doreen (left hilar, mediastinal and left cervical), pulmonary, hepatic and osseous metastatic disease. Also noting multiple pulmonary nodules, and as above mass/adenopathy, tissue sampling recommended. MRI brain obtained, no acute intracranial abnormalities noted. Oncology and Pulmonology consulted and followed. Patient underwent bronchoscopy which demonstrated occlusion of the left upper lobe with endobronchial biopsies and EBUS/TBNA performed.  Biopsy results demonstrated small cell lung cancer. Please see Pulmonology final plans/recommendations  "as follows as written by Dr. Mar: \"Patient seen by oncology who have referred her to [facility] in [facility] which is closer to her residence.  She will follow-up with oncology there and be initiated on therapy.  I will set her up for follow-up in pulmonary clinic posthospitalization.\" Oncology worked on coordinating post-hospital care, and per most recent notes, she wishes to see an Oncologist closer to her home. Per Dr. Nevarez: \"   She now has an appointment at [facility], 10/8/2024 at 9:30 AM.  She is asked to arrive at 9 AM with Dr. Archie Grimaldo MD.\" Further management per Oncology and Pulmonology after discharge.    Pneumonia  Leukocytosis  - Findings of pneumonia noted on imaging, coupled with leukocytosis, elevated procalcitonin. UA is also abnormal, but patient without urinary complaints.  RVP/MRSA/strep/legionella negative, blood cultures no growth. She completed antibiotic therapy, Pulmonology followed, discussed with them, ongoing WBC elevation likely reactive in setting of inflammation, no further antibiotics recommended at this time. Recommend repeat CBC with PCP within 1 week for reassessment.     Elevated blood pressure without prior diagnosis of hypertension, newly diagnosed hypertension  Hypokalemia  Metabolic alkalosis  - Elevated BP noted, patient not on antihypertensive medications previously, multiple elevations noted, will classify as newly diagnosed hypertension. Potassium low, metabolic alkalosis noted, concern for possible hyperaldosteronism. Aldosterone/renin ordered, Nephrology consulted and followed, given concern for suspected hyperaldosteronism, she was started on Spironolactone. Blood pressure remained elevated, so later started on Hydralazine and Amlodipine as well. Blood pressure closely monitored, Nephrology reassessed on day of discharge, recommended continuation of these medications as prescribed. Potassium had improved. Recommend repeat BMP " with PCP within 1 week for reassessment. Recommend that patient check blood pressure 2-3 times daily and record those values in log book and take it to next PCP visit to allow for greater insight into treatment efficacy to guide further management decisions. Recommend heart healthy/low sodium diet.    Type 2 diabetes mellitus with hyperglycemia  - Most recent A1c: 7.50%, new diagnosis, evaluated by diabetes educator, supplies ordered. Start low dose Metformin 500 mg daily at discharge, patient counseled on monitoring for potential side effects, such as diarrhea, and encouraged to follow up with PCP within 1 week for reassessment. Defer further medication management to PCP. Recommend that patient check blood glucose 2-3 times daily and record those values in log book and take it to next PCP visit to allow for greater insight into treatment efficacy to guide further management decisions. Recommend consistent carbohydrate/diabetic diet.    Thrombocytopenia  - Platelets found to be low on most recent labs, values relatively stable. No indications for urgent intervention at present. Recommend repeat CBC with PCP within 1 week for reassessment.    COPD  - No evidence to suggest acute exacerbation. She was started on maintenance inhaler, which should be continued at discharge. Follow up with Pulmonology after discharge.  - Supplemental oxygen as needed to maintain O2 sats 88 to 92%, pulse oximetry, telemetry monitoring.     Transaminitis  - Noted on labs, values remain elevated slightly, but abdominal exam benign. CT showing abnormal findings involving liver. Recommend repeat CMP with PCP within 1 week for reassessment.    Day of Discharge     Subjective:  Patient seen and examined this morning.  She is awake and resting in bed, doing okay at present.  She has been cleared for discharge by consultants.    Physical Exam:  Temp:  [97.9 °F (36.6 °C)-98.1 °F (36.7 °C)] 97.9 °F (36.6 °C)  Heart Rate:  [] 91  Resp:   [18-22] 20  BP: (136-161)/(67-76) 150/72  Body mass index is 19.78 kg/m².  Physical Exam  Vitals and nursing note reviewed.   Constitutional:       General: She is not in acute distress.  Cardiovascular:      Rate and Rhythm: Normal rate and regular rhythm.      Pulses: Normal pulses.      Heart sounds: Normal heart sounds.   Pulmonary:      Effort: Pulmonary effort is normal. No respiratory distress.      Breath sounds: No stridor. Decreased breath sounds present. No wheezing or rhonchi.   Abdominal:      General: Bowel sounds are normal. There is no distension.      Palpations: Abdomen is soft.      Tenderness: There is no abdominal tenderness. There is no guarding.   Musculoskeletal:      Right lower leg: No edema.      Left lower leg: No edema.   Skin:     General: Skin is warm and dry.   Neurological:      Mental Status: She is alert.         Consultants     Consult Orders (all) (From admission, onward)       Start     Ordered    09/24/24 0448  Inpatient Diabetes Educator Consult  Once,   Status:  Canceled        Provider:  (Not yet assigned)    09/24/24 0448    09/23/24 0612  Inpatient Spiritual Care Consult  Once        Provider:  (Not yet assigned)    09/23/24 0612    09/20/24 1410  Inpatient Nephrology Consult  Once        Comments: Called dr barakat he will see patient   Specialty:  Nephrology  Provider:  Armand Barakat MD    09/20/24 1410    09/20/24 1357  Hematology & Oncology Inpatient Consult  Once        Specialty:  Hematology and Oncology  Provider:  Preston Nevarez MD    09/20/24 1357    09/20/24 1356  Inpatient Pulmonology Consult  Once        Specialty:  Pulmonary Disease  Provider:  Awais Dill MD    09/20/24 1356    09/20/24 1025  Inpatient Nutrition Consult  Once        Provider:  (Not yet assigned)    09/20/24 1024    09/20/24 0509  Inpatient Case Management  Consult  Once        Provider:  (Not yet assigned)    09/20/24 0508    09/19/24 2356  Inpatient Nutrition  Consult  Once        Provider:  (Not yet assigned)    09/19/24 2356 09/19/24 2356  Inpatient Diabetes Educator Consult  Once        Provider:  (Not yet assigned)    09/19/24 2356 09/19/24 2053  [facility] (on-call MD unless specified) Details  Once        Specialty:  Hospitalist  Provider:  (Not yet assigned)    09/19/24 2053                  Procedures     BRONCHOSCOPY with washing and biopsies WITH ENDOBRONCHIAL ULTRASOUND with FNA    Imaging Results (All)       Procedure Component Value Units Date/Time    XR Chest 1 View [[id number]] Collected: 09/23/24 1535     Updated: 09/23/24 1541    Narrative:      XR CHEST 1 VW-     HISTORY: Female who is 64 years-old, status post bronchoscopy     TECHNIQUE: Frontal view of the chest     COMPARISON: 9/19/2024     FINDINGS: The heart size is normal. Pulmonary vasculature is  unremarkable. Opacity at the left upper lung appears similar to prior  exam. No large pleural effusion. No pneumothorax otherwise stable.       Impression:      No pneumothorax.     This report was finalized on 9/23/2024 3:38 PM by Dr. Ciaran Solis M.D on Workstation: [id number]       MRI Brain With & Without Contrast [[id number]] Collected: 09/21/24 0513     Updated: 09/21/24 0528    Narrative:      BRAIN MRI WITH AND WITHOUT CONTRAST     HISTORY: Evaluate for brain metastasis; E87.6-Hypokalemia; R91.8-Other  nonspecific abnormal finding of lung field     COMPARISON: None.     FINDINGS:  Multiplanar images of the head were obtained without and with  gadolinium. No areas of restricted diffusion are seen to suggest acute  infarct. Ventricles are normal in size. There is no midline shift or  mass effect. There is some mild microangiopathic change. Intracranial  flow voids appear intact. Intracranial flow voids appear intact. Few  tiny foci of susceptibility artifact are noted within the left temporal  lobe. This may reflect some areas of chronic hemosiderin deposition.  Postcontrast imaging does not show  any abnormal enhancement or evidence  of venous occlusion. There is mucosal thickening within the ethmoid  sinuses. There are bilateral mastoid effusions.          Impression:      1. No acute intracranial abnormality.   No abnormal enhancement.        This report was finalized on 9/21/2024 5:25 AM by Dr. Felicitas Cosme M.D on Workstation: [id number]       CT Chest With Contrast Diagnostic [[id number]] Collected: 09/20/24 1228     Updated: 09/20/24 1240    Narrative:      CT ABDOMEN PELVIS W CONTRAST-, CT CHEST W CONTRAST DIAGNOSTIC-     HISTORY: 40 pound unintentional weight loss     TECHNIQUE: Radiation dose reduction techniques were utilized, including  automated exposure control and exposure modulation based on body size. 3  mm images were obtained through the chest abdomen and pelvis after the  administration of IV contrast.  3D reformat images were created.  Multiple coronal, sagittal, and 3-D reconstruction images were obtained.        COMPARISON: None available        FINDINGS: Subtle sclerotic changes throughout the axial and proximal  appendicular skeleton with multiple more discrete sclerotic lesions most  conspicuous in the bilateral intertrochanteric femurs (series 5/image  80).     Heart/is normal in size. Severe coronary artery calcifications. Trace  pericardial fluid. Nondilated main pulmonary artery and thoracic aorta.  Left hilar and mediastinal lymphadenopathy. Reference 4.3 x 2.2 cm  prevascular doreen conglomeration (series 2/image 30). Reference 2.2 cm  left hilar lymph node (series 2/image 47). Lymphadenopathy causes  moderate narrowing of the mid left main pulmonary artery and left upper  lobe segmental pulmonary arteries which remain patent where seen. Left  level IV lymphadenopathy. Reference 1 cm lymph node (series 2/image 9).  Nondilated esophagus.     Left hilar lymphadenopathy causes resulting severe narrowing/stenosis of  the left upper lobe proximal segmental bronchi. Left upper  lobe remains  aerated. Contiguous with the left hilar lymphadenopathy is a 3.6 x 2.1  cm left upper lobe perihilar mass (series 3/image 41). Radiating linear  foci of consolidation extend more peripherally into the more superior  left upper lobe (131 series 3/image 28). There are small intermixed more  discrete nodular foci of consolidation. Nodular thickening along much of  the adjacent left fissure (series 3/image 44). Multiple additional solid  pulmonary nodules. Reference left apical 1.6 cm (series 3/image 14),  right upper lobe 1.2 cm (series 3/image 26) and left lower lobe sub-6 mm  (series 3/image 58). Small amount of mesial right lower lobe subpleural  consolidation and groundglass opacities (series 3/image 98). Background  advanced emphysema. No pleural effusion.     Noncirrhotic liver morphology. Numerous (greater than 20) hypoenhancing  bilobar hepatic lesions. Reference 2.4 cm segment LOIDA lesion (series  2/image 91) and 2.1 cm segment VII lesion (series 2/image 99). Normal  bilateral adrenal glands. Remaining solid organs and bowel are normal.  No abdominal pelvic adenopathy.             Impression:      1. Findings suggesting a primary left upper lobe malignancy with doreen  (left hilar, mediastinal and left cervical), pulmonary, hepatic and  osseous metastatic disease. Recommend tissue sampling and oncologic  consultation.  2. Left upper lobe perihilar mass/left hilar lymphadenopathy causes  moderate narrowing of the mid left main pulmonary artery and left upper  lobe segmental pulmonary arteries which remain patent where seen.  Lymphadenopathy also causes severe narrowing/stenosis of the left upper  lobe proximal segmental bronchi. Left upper lobe remains aerated.        This report was finalized on 9/20/2024 12:37 PM by Dr. Jose Wood M.D on Workstation: [id number]       CT Abdomen Pelvis With Contrast [[id number]] Collected: 09/20/24 1228     Updated: 09/20/24 1240    Narrative:      CT  ABDOMEN PELVIS W CONTRAST-, CT CHEST W CONTRAST DIAGNOSTIC-     HISTORY: 40 pound unintentional weight loss     TECHNIQUE: Radiation dose reduction techniques were utilized, including  automated exposure control and exposure modulation based on body size. 3  mm images were obtained through the chest abdomen and pelvis after the  administration of IV contrast.  3D reformat images were created.  Multiple coronal, sagittal, and 3-D reconstruction images were obtained.        COMPARISON: None available        FINDINGS: Subtle sclerotic changes throughout the axial and proximal  appendicular skeleton with multiple more discrete sclerotic lesions most  conspicuous in the bilateral intertrochanteric femurs (series 5/image  80).     Heart/is normal in size. Severe coronary artery calcifications. Trace  pericardial fluid. Nondilated main pulmonary artery and thoracic aorta.  Left hilar and mediastinal lymphadenopathy. Reference 4.3 x 2.2 cm  prevascular doreen conglomeration (series 2/image 30). Reference 2.2 cm  left hilar lymph node (series 2/image 47). Lymphadenopathy causes  moderate narrowing of the mid left main pulmonary artery and left upper  lobe segmental pulmonary arteries which remain patent where seen. Left  level IV lymphadenopathy. Reference 1 cm lymph node (series 2/image 9).  Nondilated esophagus.     Left hilar lymphadenopathy causes resulting severe narrowing/stenosis of  the left upper lobe proximal segmental bronchi. Left upper lobe remains  aerated. Contiguous with the left hilar lymphadenopathy is a 3.6 x 2.1  cm left upper lobe perihilar mass (series 3/image 41). Radiating linear  foci of consolidation extend more peripherally into the more superior  left upper lobe (131 series 3/image 28). There are small intermixed more  discrete nodular foci of consolidation. Nodular thickening along much of  the adjacent left fissure (series 3/image 44). Multiple additional solid  pulmonary nodules. Reference left  apical 1.6 cm (series 3/image 14),  right upper lobe 1.2 cm (series 3/image 26) and left lower lobe sub-6 mm  (series 3/image 58). Small amount of mesial right lower lobe subpleural  consolidation and groundglass opacities (series 3/image 98). Background  advanced emphysema. No pleural effusion.     Noncirrhotic liver morphology. Numerous (greater than 20) hypoenhancing  bilobar hepatic lesions. Reference 2.4 cm segment LOIDA lesion (series  2/image 91) and 2.1 cm segment VII lesion (series 2/image 99). Normal  bilateral adrenal glands. Remaining solid organs and bowel are normal.  No abdominal pelvic adenopathy.             Impression:      1. Findings suggesting a primary left upper lobe malignancy with doreen  (left hilar, mediastinal and left cervical), pulmonary, hepatic and  osseous metastatic disease. Recommend tissue sampling and oncologic  consultation.  2. Left upper lobe perihilar mass/left hilar lymphadenopathy causes  moderate narrowing of the mid left main pulmonary artery and left upper  lobe segmental pulmonary arteries which remain patent where seen.  Lymphadenopathy also causes severe narrowing/stenosis of the left upper  lobe proximal segmental bronchi. Left upper lobe remains aerated.        This report was finalized on 9/20/2024 12:37 PM by Dr. Jose Wood M.D on Workstation: [id number]       XR Chest 1 View [[id number]] Collected: 09/19/24 2050     Updated: 09/19/24 2055    Narrative:      XR CHEST 1 VW-     Clinical: Shortness of breath     COMPARISON: None     FINDINGS: Cardiac size within normal limits. There is atherosclerotic  calcification of the aorta. There is a moderate amount of left upper  lobe infiltrate seen, consistent with pneumonia. Very subtle right upper  lobe infiltrate is suggested. No pleural effusion or pulmonary edema is  demonstrated. Advise short-term follow-up to document clearing.  Remainder is unremarkable.     This report was finalized on 9/19/2024 8:51 PM by  "Dr. Keo Aragon M.D  on Workstation: [id number]                 Pertinent Labs     Results from last 7 days   Lab Units 09/25/24 0429 09/24/24 0726 09/23/24 0448 09/22/24  0258   WBC 10*3/mm3 14.60* 14.06* 13.14* 12.52*   HEMOGLOBIN g/dL 11.6* 12.7 13.0 13.0   PLATELETS 10*3/mm3 100* 102* 103* 115*     Results from last 7 days   Lab Units 09/25/24 0429 09/24/24  1819 09/24/24 0726 09/23/24  1906 09/23/24 0448 09/22/24  0258   SODIUM mmol/L 134*  --  134*  --  134* 136   POTASSIUM mmol/L 4.8 3.5 3.3* 2.9* 2.8* 3.9   CHLORIDE mmol/L 100  --  96*  --  92* 95*   CO2 mmol/L 24.0  --  28.0  --  31.0* 31.2*   BUN mg/dL 14  --  14  --  16 11   CREATININE mg/dL 0.53*  --  0.43*  --  0.55* 0.55*   GLUCOSE mg/dL 135*  --  111*  --  129* 153*   EGFR mL/min/1.73 103.4  --  108.8  --  102.5 102.5     Results from last 7 days   Lab Units 09/25/24 0429 09/24/24 0726 09/23/24 0448 09/22/24  0258   ALBUMIN g/dL 3.4* 3.5 3.7 3.8   BILIRUBIN mg/dL 0.4 0.5 0.6 0.6   ALK PHOS U/L 107 109 129* 122*   AST (SGOT) U/L 43* 33* 34* 37*   ALT (SGPT) U/L 59* 44* 41* 45*     Results from last 7 days   Lab Units 09/25/24 0429 09/24/24 0726 09/23/24 0448 09/22/24 0258 09/21/24  0351 09/20/24  1003 09/19/24 2006   CALCIUM mg/dL 9.0 8.9 9.1 9.2   < > 9.0 9.6   ALBUMIN g/dL 3.4* 3.5 3.7 3.8   < >  --  3.9   MAGNESIUM mg/dL 2.6*  --   --   --   --  2.0 2.0    < > = values in this interval not displayed.       Results from last 7 days   Lab Units 09/19/24 2006   HSTROP T ng/L 16*   PROBNP pg/mL 709.0     Results from last 7 days   Lab Units 09/19/24  2257   CHLORIDE UR mmol/L 28         Invalid input(s): \"LDLCALC\"  Results from last 7 days   Lab Units 09/23/24  1423 09/20/24  0454 09/20/24  0001 09/19/24  2341   BLOODCX   --   --  No growth at 5 days No growth at 5 days   RESPCX  Scant growth (1+) Normal respiratory mihai. No S. aureus or Pseudomonas aeruginosa detected. Final report.  --   --   --    MRSAPCR   --  No MRSA Detected  " --   --      Results from last 7 days   Lab Units 09/19/24  2257   COVID19  Not Detected       Test Results Pending at Discharge     Pending Results       Procedure [Order ID] Specimen - Date/Time    Flow Cytometry [[id number]] Collected: 09/23/24 1439    Specimen: Lymph Node from Mediastinum     Fungus Culture - Aspirate, Bronchus [[id number]] Collected: 09/23/24 1423    Specimen: Aspirate from Bronchus Updated: 09/23/24 1533    Renin Direct Assay [[id number]] Collected: 09/20/24 1003    Specimen: Blood Updated: 09/20/24 1034              Discharge Details        Discharge Medications        New Medications        Instructions Start Date   amLODIPine 5 MG tablet  Commonly known as: NORVASC   5 mg, Oral, Every 24 Hours Scheduled   Start Date: September 26, 2024     budesonide-formoterol 160-4.5 MCG/ACT inhaler  Commonly known as: SYMBICORT   2 puffs, Inhalation, 2 Times Daily      hydrALAZINE 50 MG tablet  Commonly known as: APRESOLINE   50 mg, Oral, Every 8 Hours Scheduled      metFORMIN  MG 24 hr tablet  Commonly known as: GLUCOPHAGE-XR   500 mg, Oral, Daily With Breakfast      OneTouch Delica Plus [id number] misc   Use to test blood glucose up to four times daily as needed. Formulary Compliance Approval. Diagnosis: Type 2 Diabetes - Not Insulin Dependent      OneTouch Verio Flex System w/Device kit   Use to test blood glucose up to four times daily as needed. Formulary Compliance Approval. Diagnosis: Type 2 Diabetes - Not Insulin Dependent      OneTouch Verio test strip  Generic drug: glucose blood   Use to test blood glucose up to four times daily as needed. Formulary Compliance Approval. Diagnosis: Type 2 Diabetes - Not Insulin Dependent      spironolactone 100 MG tablet  Commonly known as: ALDACTONE   100 mg, Oral, Daily   Start Date: September 26, 2024     tiotropium bromide monohydrate 2.5 MCG/ACT aerosol solution inhaler  Commonly known as: SPIRIVA RESPIMAT   2 puffs, Inhalation, Daily               No  Known Allergies    Discharge Disposition:  Home or Self Care      Discharge Diet:  Diet Order   Procedures    Diet: Cardiac, Diabetic; Healthy Heart (2-3 Na+); Consistent Carbohydrate; Fluid Consistency: Thin (IDDSI 0)       Discharge Activity:   Activity Instructions       Gradually Increase Activity Until at Pre-Hospitalization Level              CODE STATUS:    Code Status and Medical Interventions: CPR (Attempt to Resuscitate); Full   Ordered at: 09/19/24 2115     Code Status (Patient has no pulse and is not breathing):    CPR (Attempt to Resuscitate)     Medical Interventions (Patient has pulse or is breathing):    Full       No future appointments.  Additional Instructions for the Follow-ups that You Need to Schedule       Discharge Follow-up with PCP   As directed       Currently Documented PCP:    Alexandra Keller PA-C    PCP Phone Number:    [phone]     Follow Up Details: within 1 week for reassessment, medication and symptom review, glucose and blood pressure check, CMP and CBC        Discharge Follow-up with Specialty: Oncology, Pulmonology   As directed      Specialty: Oncology, Pulmonology   Follow Up Details: within 1 week after discharge or as scheduled, please call to confirm appointments               Follow-up Information       Archie Grimaldo MD Follow up on 10/8/2024.    Specialty: Hematology and Oncology  Why: appt at 0930, please arrive at 9am  Contact information:  [address] [address] [phone]               Alexandra Keller PA-C .    Specialty: Physician Assistant  Why: within 1 week for reassessment, medication and symptom review, glucose and blood pressure check, CMP and CBC  Contact information:  [address] [place] [id number] [phone]                             Additional Instructions for the Follow-ups that You Need to Schedule       Discharge Follow-up with PCP   As directed       Currently Documented PCP:    Ice,  Alexandra MARTIN PA-C    PCP Phone Number:    [phone]     Follow Up Details: within 1 week for reassessment, medication and symptom review, glucose and blood pressure check, CMP and CBC        Discharge Follow-up with Specialty: Oncology, Pulmonology   As directed      Specialty: Oncology, Pulmonology   Follow Up Details: within 1 week after discharge or as scheduled, please call to confirm appointments            Time Spent on Discharge:  Greater than 30 minutes      DO [facility]  09/25/24  15:21 EDT

## 2024-09-25 NOTE — CASE MANAGEMENT/SOCIAL WORK
Continued Stay Note  [facility]     Patient Name: Kranthi Farley  MRN: [id number]  Today's Date: 9/25/2024    Admit Date: 9/19/2024    Plan: Home with family   Discharge Plan       Row Name 09/25/24 1325       Plan    Plan Home with family    Patient/Family in Agreement with Plan yes    Plan Comments Spoke with Dr. Prajapati, reviewed chart and discussed during MDR. Pt may be ready for dc pending clearance from consultants. Dr. Castle/renal, has cleared and recs BMP in 1 week.   Dr. Mar/pulmonary, has cleared and his office will set up f/u outpt.   Dr. Patterson, per RN he has cleared for dc. Updated Dr. Prajapati and RN, plans hoemith  dtr to transport. -Shahida JEAN-BAPTISTE                   Discharge Codes    No documentation.                 Expected Discharge Date and Time       Expected Discharge Date Expected Discharge Time    Sep 26, 2024               Shahida Darling RN

## 2024-09-26 LAB — RENIN PLAS-CCNC: 0.37 NG/ML/HR (ref 0.17–5.38)

## 2024-09-27 LAB — FUNGUS WND CULT: ABNORMAL

## 2024-10-07 ENCOUNTER — READMISSION MANAGEMENT (OUTPATIENT)
Dept: CALL CENTER | Facility: HOSPITAL | Age: 64
End: 2024-10-07
Payer: COMMERCIAL

## 2024-10-07 LAB — FUNGUS WND CULT: ABNORMAL

## 2024-10-07 NOTE — OUTREACH NOTE
Medical Week 2 Survey      Flowsheet Row Responses   [facility] patient discharged from? [place]   Does the patient have one of the following disease processes/diagnoses(primary or secondary)? Other   Week 2 attempt successful? No   Unsuccessful attempts Attempt 1            Erendira BARRIOS - Registered Nurse

## 2024-10-11 LAB
CYTO UR: NORMAL
LAB AP CASE REPORT: NORMAL
LAB AP INTRADEPARTMENTAL CONSULT: NORMAL
LAB AP SPECIAL STAINS: NORMAL
PATH REPORT.ADDENDUM SPEC: NORMAL
PATH REPORT.FINAL DX SPEC: NORMAL
PATH REPORT.GROSS SPEC: NORMAL

## 2024-10-14 ENCOUNTER — READMISSION MANAGEMENT (OUTPATIENT)
Dept: CALL CENTER | Facility: HOSPITAL | Age: 64
End: 2024-10-14
Payer: COMMERCIAL

## 2024-10-14 NOTE — OUTREACH NOTE
Medical Week 3 Survey      Flowsheet Row Responses   [facility] patient discharged from? [place]   Does the patient have one of the following disease processes/diagnoses(primary or secondary)? Other   Week 3 attempt successful? No   Unsuccessful attempts Attempt 1            Erendira WASHINGTON - Registered Nurse

## 2024-10-21 LAB — FUNGUS WND CULT: ABNORMAL

## 2024-10-24 DIAGNOSIS — C80.1 SMALL CELL CARCINOMA: Primary | ICD-10-CM

## 2024-10-24 RX ORDER — MORPHINE SULFATE 100 MG/5ML
5 SOLUTION ORAL EVERY 4 HOURS PRN
Qty: 30 ML | Refills: 0 | Status: SHIPPED | OUTPATIENT
Start: 2024-10-24

## 2024-10-24 RX ORDER — LORAZEPAM 2 MG/ML
1 CONCENTRATE ORAL EVERY 4 HOURS PRN
Qty: 30 ML | Refills: 0 | Status: SHIPPED | OUTPATIENT
Start: 2024-10-24

## 2024-11-04 LAB
MYCOBACTERIUM SPEC CULT: NORMAL
NIGHT BLUE STAIN TISS: NORMAL

## 2024-11-14 ENCOUNTER — TELEPHONE (OUTPATIENT)
Dept: ONCOLOGY | Facility: CLINIC | Age: 64
End: 2024-11-14
Payer: COMMERCIAL

## 2024-11-14 NOTE — TELEPHONE ENCOUNTER
Caller: HANH    Relationship: Other    Best call back number: [phone]    What is the best time to reach you: ANYTIME    Who are you requesting to speak with (clinical staff, provider,  specific staff member): CLINICAL    What was the call regarding: HANH CALLING FROM [place] REGARDING ONCOLOGY REFERRAL TO ASHLEE CORBETT MD. DR CORBETT IS NO LONGER WITH [facility]. SHOULD APPT BE MADE WITH ANOTHER PROVIDER OR NOT?    PLEASE CALL TO ADVISE.

## 2024-11-14 NOTE — TELEPHONE ENCOUNTER
Called Lara, let her know that she does not need to see Archie Grimaldo MD since he is not at the practice.

## 2024-11-15 LAB
CYTO UR: NORMAL
LAB AP CASE REPORT: NORMAL
LAB AP INTRADEPARTMENTAL CONSULT: NORMAL
LAB AP SPECIAL STAINS: NORMAL
Lab: NORMAL
PATH REPORT.ADDENDUM SPEC: NORMAL
PATH REPORT.FINAL DX SPEC: NORMAL
PATH REPORT.GROSS SPEC: NORMAL

## (undated) DEVICE — FRCP BX RADJAW4 PULM WO NDL STD1.8X2 100

## (undated) DEVICE — TRAP,MUCUS SPECIMEN, 80CC: Brand: MEDLINE

## (undated) DEVICE — TP SXN YANKR BULB STRL

## (undated) DEVICE — SUT MNCRYL PLS ANTIB UD 3/0 PS2 27IN

## (undated) DEVICE — DRAPE,UNDERBUTTOCKS,PCH,STERILE: Brand: MEDLINE

## (undated) DEVICE — MINOR-LF: Brand: MEDLINE INDUSTRIES, INC.

## (undated) DEVICE — PAD GRND REM POLYHESIVE A/ DISP

## (undated) DEVICE — SINGLE USE BIOPSY VALVE MAJ-210: Brand: SINGLE USE BIOPSY VALVE (STERILE)

## (undated) DEVICE — SLV SCD LEG COMFORT KENDALLSCD MD REPROC

## (undated) DEVICE — Device: Brand: BALLOON

## (undated) DEVICE — SENSR O2 OXIMAX FNGR A/ 18IN NONSTR

## (undated) DEVICE — VISION PROBE ADAPTER AND SUCTION ADAPTER

## (undated) DEVICE — PAD SANI MAXI W/ADHS SNG WRP 11IN

## (undated) DEVICE — LARGE BORE STOPCOCK WITH EXTENSION SET, MALE LUER LOCK ADAPTER WITH RETRACTABLE COLLAR

## (undated) DEVICE — SINGLE USE ASPIRATION NEEDLE: Brand: SINGLE USE ASPIRATION NEEDLE

## (undated) DEVICE — VISION PROBE: Brand: ION

## (undated) DEVICE — CATHETER: Brand: ION

## (undated) DEVICE — PREP TRAY WITH CHG: Brand: MEDLINE INDUSTRIES, INC.

## (undated) DEVICE — SWIVEL CONNECTOR

## (undated) DEVICE — ADAPT SWVL FIBROPTIC BRONCH

## (undated) DEVICE — GOWN,REINFORCE,POLY,SIRUS,BREATH SLV,XLG: Brand: MEDLINE

## (undated) DEVICE — INTENDED FOR TISSUE SEPARATION, AND OTHER PROCEDURES THAT REQUIRE A SHARP SURGICAL BLADE TO PUNCTURE OR CUT.: Brand: BARD-PARKER ® CARBON RIB-BACK BLADES

## (undated) DEVICE — GLV SURG BIOGEL LTX PF 7

## (undated) DEVICE — ADAPT CLN BIOGUARD AIR/H2O DISP

## (undated) DEVICE — SUT VIC 3/0 SH 27IN J416H

## (undated) DEVICE — VITAL SIGNS™ JACKSON-REES CIRCUITS: Brand: VITAL SIGNS™

## (undated) DEVICE — TUBING, SUCTION, 1/4" X 10', STRAIGHT: Brand: MEDLINE

## (undated) DEVICE — KIT,ANTI FOG,W/SPONGE & FLUID,SOFT PACK: Brand: MEDLINE

## (undated) DEVICE — SINGLE USE SUCTION VALVE MAJ-209: Brand: SINGLE USE SUCTION VALVE (STERILE)